# Patient Record
Sex: FEMALE | Race: WHITE | NOT HISPANIC OR LATINO | Employment: OTHER | URBAN - METROPOLITAN AREA
[De-identification: names, ages, dates, MRNs, and addresses within clinical notes are randomized per-mention and may not be internally consistent; named-entity substitution may affect disease eponyms.]

---

## 2023-09-02 PROBLEM — E78.5 DYSLIPIDEMIA: Status: ACTIVE | Noted: 2023-09-02

## 2023-09-02 PROBLEM — H52.7 REFRACTIVE ERROR: Status: ACTIVE | Noted: 2023-09-02

## 2023-09-02 PROBLEM — H04.129 DRY EYE: Status: ACTIVE | Noted: 2023-09-02

## 2023-09-02 PROBLEM — H26.9 EARLY CATARACT: Status: ACTIVE | Noted: 2023-09-02

## 2023-09-02 PROBLEM — M51.37 DEGENERATION OF LUMBAR OR LUMBOSACRAL INTERVERTEBRAL DISC: Status: ACTIVE | Noted: 2023-09-02

## 2023-09-02 PROBLEM — H26.9 CATARACTS, BOTH EYES: Status: ACTIVE | Noted: 2023-09-02

## 2023-09-02 PROBLEM — G44.329 CHRONIC POST-TRAUMATIC HEADACHE, NOT INTRACTABLE: Status: ACTIVE | Noted: 2023-09-02

## 2023-09-02 PROBLEM — H02.839 DERMATOCHALASIA: Status: ACTIVE | Noted: 2023-09-02

## 2023-09-02 PROBLEM — I65.23 BILATERAL CAROTID ARTERY STENOSIS: Status: ACTIVE | Noted: 2023-09-02

## 2023-09-02 PROBLEM — N81.10 FEMALE CYSTOCELE: Status: ACTIVE | Noted: 2023-09-02

## 2023-09-02 PROBLEM — H57.10 PAIN IN AND AROUND EYE: Status: ACTIVE | Noted: 2023-09-02

## 2023-09-02 PROBLEM — H53.8 BLURRED VISION, BILATERAL: Status: ACTIVE | Noted: 2023-09-02

## 2023-09-02 PROBLEM — M85.80 OSTEOPENIA: Status: ACTIVE | Noted: 2023-09-02

## 2023-09-02 PROBLEM — I77.9 CAROTID ARTERY DISEASE (CMS-HCC): Status: ACTIVE | Noted: 2023-09-02

## 2023-09-02 PROBLEM — E55.9 VITAMIN D DEFICIENCY: Status: ACTIVE | Noted: 2023-09-02

## 2023-09-02 PROBLEM — N95.2 ATROPHY OF VAGINA: Status: ACTIVE | Noted: 2023-09-02

## 2023-09-02 PROBLEM — S09.90XA INJURY OF HEAD: Status: ACTIVE | Noted: 2023-09-02

## 2023-09-02 PROBLEM — N64.4 BREAST PAIN, RIGHT: Status: ACTIVE | Noted: 2023-09-02

## 2023-09-02 PROBLEM — M25.579 ANKLE PAIN: Status: ACTIVE | Noted: 2023-09-02

## 2023-09-02 PROBLEM — I34.0 MITRAL VALVE REGURGITATION: Status: ACTIVE | Noted: 2023-09-02

## 2023-09-02 PROBLEM — H34.211 HOLLENHORST PLAQUE, RIGHT EYE: Status: ACTIVE | Noted: 2023-09-02

## 2023-09-02 PROBLEM — H25.11 AGE-RELATED NUCLEAR CATARACT OF RIGHT EYE: Status: ACTIVE | Noted: 2023-09-02

## 2023-09-02 PROBLEM — K21.9 GERD (GASTROESOPHAGEAL REFLUX DISEASE): Status: ACTIVE | Noted: 2023-09-02

## 2023-09-02 PROBLEM — H43.829 VITREOMACULAR TRACTION SYNDROME: Status: ACTIVE | Noted: 2023-09-02

## 2023-09-02 PROBLEM — H01.009 BLEPHARITIS: Status: ACTIVE | Noted: 2023-09-02

## 2023-09-02 PROBLEM — L90.0 LICHEN SCLEROSUS: Status: ACTIVE | Noted: 2023-09-02

## 2023-09-02 PROBLEM — H35.9 RETINAL DISORDERS: Status: ACTIVE | Noted: 2023-09-02

## 2023-09-02 PROBLEM — M81.0 AGE RELATED OSTEOPOROSIS: Status: ACTIVE | Noted: 2023-09-02

## 2023-09-02 PROBLEM — R73.02 IMPAIRED GLUCOSE TOLERANCE: Status: ACTIVE | Noted: 2023-09-02

## 2023-09-02 PROBLEM — H04.129 TEAR FILM INSUFFICIENCY: Status: ACTIVE | Noted: 2023-09-02

## 2023-09-02 PROBLEM — H25.10 NUCLEAR SCLEROTIC CATARACT: Status: ACTIVE | Noted: 2023-09-02

## 2023-09-02 PROBLEM — R73.09: Status: ACTIVE | Noted: 2023-09-02

## 2023-09-02 PROBLEM — H57.8A9 SENSATION OF FOREIGN BODY IN EYE: Status: ACTIVE | Noted: 2023-09-02

## 2023-09-02 PROBLEM — E78.5 HYPERLIPIDEMIA: Status: ACTIVE | Noted: 2023-09-02

## 2023-09-02 PROBLEM — M51.379 DEGENERATION OF LUMBAR OR LUMBOSACRAL INTERVERTEBRAL DISC: Status: ACTIVE | Noted: 2023-09-02

## 2023-09-02 RX ORDER — ASPIRIN 81 MG/1
81 TABLET ORAL DAILY
COMMUNITY
Start: 2017-09-06

## 2023-09-02 RX ORDER — BIOTIN/LUTEIN 5000MCG-10
1 TABLET ORAL DAILY
COMMUNITY
End: 2023-10-27 | Stop reason: ALTCHOICE

## 2023-09-02 RX ORDER — VIT C/E/ZN/COPPR/LUTEIN/ZEAXAN 250MG-90MG
50 CAPSULE ORAL DAILY
COMMUNITY
Start: 2021-01-08

## 2023-09-02 RX ORDER — HYDROGEN PEROXIDE 3 %
40 SOLUTION, NON-ORAL MISCELLANEOUS
COMMUNITY
Start: 2015-09-27

## 2023-09-02 RX ORDER — ATORVASTATIN CALCIUM 80 MG/1
80 TABLET, FILM COATED ORAL DAILY
COMMUNITY
End: 2023-11-03

## 2023-09-02 RX ORDER — ACETAMINOPHEN 500 MG
TABLET ORAL
COMMUNITY
End: 2023-10-09 | Stop reason: ALTCHOICE

## 2023-09-02 RX ORDER — IBUPROFEN 200 MG
200 TABLET ORAL 3 TIMES DAILY PRN
COMMUNITY

## 2023-09-02 RX ORDER — ATORVASTATIN CALCIUM 20 MG/1
20 TABLET, FILM COATED ORAL DAILY
COMMUNITY
End: 2023-10-04 | Stop reason: SDUPTHER

## 2023-09-07 ENCOUNTER — HOSPITAL ENCOUNTER (OUTPATIENT)
Dept: DATA CONVERSION | Facility: HOSPITAL | Age: 73
Discharge: HOME | End: 2023-09-07
Payer: COMMERCIAL

## 2023-09-07 DIAGNOSIS — H25.12 AGE-RELATED NUCLEAR CATARACT, LEFT EYE: ICD-10-CM

## 2023-09-07 DIAGNOSIS — H25.11 AGE-RELATED NUCLEAR CATARACT, RIGHT EYE: ICD-10-CM

## 2023-09-12 ENCOUNTER — HOSPITAL ENCOUNTER (OUTPATIENT)
Dept: DATA CONVERSION | Facility: HOSPITAL | Age: 73
Discharge: HOME | End: 2023-09-12
Payer: COMMERCIAL

## 2023-09-12 DIAGNOSIS — H25.11 AGE-RELATED NUCLEAR CATARACT, RIGHT EYE: ICD-10-CM

## 2023-09-26 ENCOUNTER — HOSPITAL ENCOUNTER (OUTPATIENT)
Dept: DATA CONVERSION | Facility: HOSPITAL | Age: 73
Discharge: HOME | End: 2023-09-26
Payer: COMMERCIAL

## 2023-09-26 DIAGNOSIS — H25.12 AGE-RELATED NUCLEAR CATARACT, LEFT EYE: ICD-10-CM

## 2023-10-03 ENCOUNTER — PREP FOR PROCEDURE (OUTPATIENT)
Dept: OPHTHALMOLOGY | Facility: CLINIC | Age: 73
End: 2023-10-03

## 2023-10-03 ENCOUNTER — OFFICE VISIT (OUTPATIENT)
Dept: OPHTHALMOLOGY | Facility: CLINIC | Age: 73
End: 2023-10-03
Payer: MEDICARE

## 2023-10-03 DIAGNOSIS — H35.341 MACULAR HOLE, RIGHT EYE: ICD-10-CM

## 2023-10-03 DIAGNOSIS — H35.40 PERIPHERAL RETINAL THINNING: Primary | ICD-10-CM

## 2023-10-03 PROCEDURE — 1036F TOBACCO NON-USER: CPT | Performed by: OPHTHALMOLOGY

## 2023-10-03 PROCEDURE — 99214 OFFICE O/P EST MOD 30 MIN: CPT | Performed by: OPHTHALMOLOGY

## 2023-10-03 PROCEDURE — 1159F MED LIST DOCD IN RCRD: CPT | Performed by: OPHTHALMOLOGY

## 2023-10-03 PROCEDURE — 92134 CPTRZ OPH DX IMG PST SGM RTA: CPT | Mod: BILATERAL PROCEDURE | Performed by: OPHTHALMOLOGY

## 2023-10-03 PROCEDURE — 1160F RVW MEDS BY RX/DR IN RCRD: CPT | Performed by: OPHTHALMOLOGY

## 2023-10-03 ASSESSMENT — TONOMETRY
OS_IOP_MMHG: 13
IOP_METHOD: GOLDMANN APPLANATION
OD_IOP_MMHG: 12

## 2023-10-03 ASSESSMENT — EXTERNAL EXAM - LEFT EYE: OS_EXAM: NORMAL

## 2023-10-03 ASSESSMENT — VISUAL ACUITY
OS_CC: 20/60
METHOD: SNELLEN - LINEAR
OD_CC: 20/200
CORRECTION_TYPE: GLASSES
OD_PH_CC: NO IMPROVEMENT
OS_PH_CC: 20/30

## 2023-10-03 ASSESSMENT — SLIT LAMP EXAM - LIDS
COMMENTS: NORMAL
COMMENTS: NORMAL

## 2023-10-03 ASSESSMENT — EXTERNAL EXAM - RIGHT EYE: OD_EXAM: NORMAL

## 2023-10-03 NOTE — PROGRESS NOTES
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 Infections, discomfort form sutures and hemorrhage as well as retinal detachment were explained. The biggest risk of  surgery failing in a retinal detachment are due to challenges in surgery or late scar formation described as proliferative vitreoretinopathy. In a membrane peel there can be  along period for recovery of visual acuity, in more long standing membrane,s takes longer to resolve retinal abnormalities            TODAY   (OU)   - OCT Macula By:Forrest Jacobs  OCT Macula    OD  wnl.  no retinal thinning  OS  minimal VMA with mild blunting of foveal contour, unchanged     also  1) Hollenhorst plaque, right eye   carotid ultrasound was normal per patient    2) Vitreomacular traction syndrome of left eye  --monitor- minimal traction     3) Nuclear sclerotic cataract of both eyes  -not visually significant          Hi quality OCT  scans obtained  signal good    OCT OD - Abnormal  Foveal Contour, PVD FTMH Edema, IS/OS Junction abnormal  OCT OS - Normal Foveal Contour, No Edema, IS/OS Junction Normal VMA    additional commnents:                  FOLLOWUP SCHEDULED:

## 2023-10-04 ENCOUNTER — OFFICE VISIT (OUTPATIENT)
Dept: OPHTHALMOLOGY | Facility: CLINIC | Age: 73
End: 2023-10-04
Payer: MEDICARE

## 2023-10-04 DIAGNOSIS — Z96.1 PSEUDOPHAKIA OF BOTH EYES: ICD-10-CM

## 2023-10-04 DIAGNOSIS — Z98.42 STATUS POST LEFT CATARACT EXTRACTION: Primary | ICD-10-CM

## 2023-10-04 DIAGNOSIS — Z98.41 STATUS POST RIGHT CATARACT EXTRACTION: ICD-10-CM

## 2023-10-04 PROCEDURE — 99024 POSTOP FOLLOW-UP VISIT: CPT | Performed by: OPHTHALMOLOGY

## 2023-10-04 RX ORDER — KETOROLAC TROMETHAMINE 5 MG/ML
1 SOLUTION OPHTHALMIC 4 TIMES DAILY
COMMUNITY
Start: 2023-02-13 | End: 2023-10-27 | Stop reason: ALTCHOICE

## 2023-10-04 RX ORDER — PREDNISOLONE ACETATE 10 MG/ML
1 SUSPENSION/ DROPS OPHTHALMIC 4 TIMES DAILY
COMMUNITY
Start: 2023-02-13 | End: 2023-10-27 | Stop reason: ALTCHOICE

## 2023-10-04 RX ORDER — CIPROFLOXACIN HYDROCHLORIDE 3 MG/ML
1 SOLUTION/ DROPS OPHTHALMIC 4 TIMES DAILY
COMMUNITY
Start: 2023-02-13 | End: 2023-10-27 | Stop reason: SINTOL

## 2023-10-04 ASSESSMENT — VISUAL ACUITY
OD_SC: CF AT 3'
OS_SC: 20/40
OS_SC+: -2
METHOD: SNELLEN - LINEAR

## 2023-10-04 ASSESSMENT — ENCOUNTER SYMPTOMS
ENDOCRINE NEGATIVE: 0
RESPIRATORY NEGATIVE: 0
MUSCULOSKELETAL NEGATIVE: 0
EYES NEGATIVE: 0
NEUROLOGICAL NEGATIVE: 0
CONSTITUTIONAL NEGATIVE: 0
CARDIOVASCULAR NEGATIVE: 0
HEMATOLOGIC/LYMPHATIC NEGATIVE: 0
PSYCHIATRIC NEGATIVE: 0
ALLERGIC/IMMUNOLOGIC NEGATIVE: 0
GASTROINTESTINAL NEGATIVE: 0

## 2023-10-04 ASSESSMENT — EXTERNAL EXAM - RIGHT EYE: OD_EXAM: NORMAL

## 2023-10-04 ASSESSMENT — SLIT LAMP EXAM - LIDS
COMMENTS: NORMAL
COMMENTS: NORMAL

## 2023-10-04 ASSESSMENT — EXTERNAL EXAM - LEFT EYE: OS_EXAM: NORMAL

## 2023-10-04 NOTE — PROGRESS NOTES
Assessment/Plan   Problem List Items Addressed This Visit          Eye/Vision problems    Pseudophakia of both eyes     Well healing both eyes (OU), will plan for full post op in 3 weeks         Relevant Orders    Return visit    Status post right cataract extraction     Multiple weeks post op right eye (OD). Finish drop taper.    Use Prednisolone and Flurbiprofen once daily in the right eye for 1 week             Relevant Orders    Return visit    Status post left cataract extraction - Primary     Here for 1 week of the left eye. Discussed calling if any pain, redness, or vision loss. OK to discontinue use of shield at night. No bathing or swimming restrictions. No activity restrictions at this time.  Will plan to see back in 3 weeks for full post op both eyes (OU). Begin drop taper.    Use Prednisolone and Flurbiprofen TID in the left eye for 1 week  Use Prednisolone and Flurbiprofen BID in the left eye for 1 week  Use Prednisolone and Flurbiprofen daily in the left eye for 1 week              Relevant Orders    Return visit

## 2023-10-04 NOTE — ASSESSMENT & PLAN NOTE
Multiple weeks post op right eye (OD). Finish drop taper.    Use Prednisolone and Flurbiprofen once daily in the right eye for 1 week

## 2023-10-04 NOTE — ASSESSMENT & PLAN NOTE
Bedside and verbal shift report given to DAMIÁN Long (oncoming nurse) by ANEESH Herbert LPN (off going nurse). Report included SBAR, MAR and Kardex. Here for 1 week of the left eye. Discussed calling if any pain, redness, or vision loss. OK to discontinue use of shield at night. No bathing or swimming restrictions. No activity restrictions at this time.  Will plan to see back in 3 weeks for full post op both eyes (OU). Begin drop taper.    Use Prednisolone and Flurbiprofen TID in the left eye for 1 week  Use Prednisolone and Flurbiprofen BID in the left eye for 1 week  Use Prednisolone and Flurbiprofen daily in the left eye for 1 week

## 2023-10-04 NOTE — Clinical Note
Well healing from posterior chamber intraocular lens (PCIOL) both eyes (OU), due to see you for macular hole repair right eye (OD) next week. Will come in for final post op with me in 3 weeks unless we need to push back due to retina concerns.

## 2023-10-04 NOTE — PATIENT INSTRUCTIONS
Post-operative instructions for Cataract Surgery  Shaheen Ngo MD      Surgical eye: Left      Restrictions:  Continue to avoid rubbing or pressing on the eye(s)  May resume normal activity/bathing but if any issues please call  May discontinue plastic shield while sleeping/napping    Drop instructions:  Prednisolone (pink) and ketorolac (grey) three times daily for 1 week, then twice daily for 1 week, then once daily for 1 week    Wait several minutes after instillation of a drop before applying the next    Any concerns, issues, or questions please call our office  Please report any significantly increased redness/pain or any big vision changes    Thank you so much for choosing me to provide your care today!    If you were dilated your vision may remain blurry   or light sensitive for several hours.    The nature of eye and vision problems can require frequent follow up, please make every effort to adhere to any future appointments.    If you have any issues, questions, or concerns,   please do not hesitate to reach out.    If you receive a survey in regards to your care today, please mention any exceptional care my office staff and/or technicians provided.    You can reach our office at this number:  [phone]

## 2023-10-09 DIAGNOSIS — Z98.890 HISTORY OF EYE SURGERY: Primary | ICD-10-CM

## 2023-10-09 RX ORDER — PROPARACAINE HYDROCHLORIDE 5 MG/ML
1 SOLUTION/ DROPS OPHTHALMIC ONCE
Status: CANCELLED | OUTPATIENT
Start: 2023-10-12

## 2023-10-09 RX ORDER — PHENYLEPHRINE HYDROCHLORIDE 25 MG/ML
1 SOLUTION/ DROPS OPHTHALMIC
Status: CANCELLED | OUTPATIENT
Start: 2023-10-12 | End: 2023-10-12

## 2023-10-09 RX ORDER — PREDNISOLONE ACETATE 10 MG/ML
1 SUSPENSION/ DROPS OPHTHALMIC 4 TIMES DAILY
Qty: 3 ML | Refills: 0 | Status: SHIPPED | OUTPATIENT
Start: 2023-10-13 | End: 2023-10-13

## 2023-10-09 RX ORDER — TROPICAMIDE 10 MG/ML
1 SOLUTION/ DROPS OPHTHALMIC
Status: CANCELLED | OUTPATIENT
Start: 2023-10-09 | End: 2023-10-09

## 2023-10-10 RX ORDER — TROPICAMIDE 10 MG/ML
1 SOLUTION/ DROPS OPHTHALMIC
Status: CANCELLED | OUTPATIENT
Start: 2023-10-10

## 2023-10-10 RX ORDER — PHENYLEPHRINE HYDROCHLORIDE 25 MG/ML
1 SOLUTION/ DROPS OPHTHALMIC
Status: CANCELLED | OUTPATIENT
Start: 2023-10-10

## 2023-10-10 NOTE — H&P
History Of Present Illness  Sally A Behanna is a 72 y.o. female presenting with macular hole right eye (OD).     Past Medical History  She has a past medical history of Autoimmune disorder (CMS/HCC), Cataract, Delayed emergence from general anesthesia, Dry eye syndrome of unspecified lacrimal gland (05/17/2017), GERD (gastroesophageal reflux disease), Heart valve disease, Hordeolum externum unspecified eye, unspecified eyelid (05/17/2017), Hyperlipidemia, Osteopenia, Personal history of diseases of the skin and subcutaneous tissue, and Toxic effect of venom of bees, accidental (unintentional), initial encounter (05/17/2017).    Surgical History  She has a past surgical history that includes Other surgical history (10/24/2022); Other surgical history (10/24/2022); Other surgical history (10/24/2022); Other surgical history (10/24/2022); Cataract extraction; and Other surgical history.     Social History  She reports that she has never smoked. She has never used smokeless tobacco. She reports current alcohol use. She reports that she does not use drugs.     Allergies  Mercury, Acrylic acid, Amoxicillin-pot clavulanate, Balsam peru, Codeine, Iodinated contrast media, Latex, Menthol, Sulfamethoxazole-trimethoprim, Bacitracin, Ciprofloxacin, and Gold salts    ROS  Patient denies ocular pain, redness, discharge, decreased vision, double vision, blind spots, flashes, or floaters.     Physical Exam  Constitutional: AOx3, no distress, alert and cooperative  Eyes: Globe intact and grossly normal  Head/Neck: Atraumatic   Cardiovascular: Warm, well perfused  Respiratory/Thorax: Symmetric chest rise  Extremities: Moving all extremities equally  Neurologic: No gross neurologic deficits  Psychological: Appropriate mood and affect  Skin: Warm and dry         Assessment/Plan   # Full thickness macular hole right eye (OD)    - Proceed with pars plana vitrectomy, membrane peel, gas right eye (OD)       Kendell Villar MD,  PhD  Ophthalmology, PGY-2  9:51 PM  October 9, 2023

## 2023-10-11 ENCOUNTER — ANESTHESIA EVENT (OUTPATIENT)
Dept: OPERATING ROOM | Facility: CLINIC | Age: 73
End: 2023-10-11
Payer: MEDICARE

## 2023-10-12 ENCOUNTER — ANESTHESIA (OUTPATIENT)
Dept: OPERATING ROOM | Facility: CLINIC | Age: 73
End: 2023-10-12
Payer: MEDICARE

## 2023-10-12 ENCOUNTER — HOSPITAL ENCOUNTER (OUTPATIENT)
Facility: CLINIC | Age: 73
Setting detail: OUTPATIENT SURGERY
Discharge: HOME | End: 2023-10-12
Attending: OPHTHALMOLOGY | Admitting: OPHTHALMOLOGY
Payer: MEDICARE

## 2023-10-12 VITALS
HEART RATE: 71 BPM | DIASTOLIC BLOOD PRESSURE: 61 MMHG | HEIGHT: 67 IN | OXYGEN SATURATION: 99 % | RESPIRATION RATE: 17 BRPM | SYSTOLIC BLOOD PRESSURE: 111 MMHG | TEMPERATURE: 97.5 F | WEIGHT: 142.64 LBS | BODY MASS INDEX: 22.39 KG/M2

## 2023-10-12 DIAGNOSIS — H35.40 PERIPHERAL RETINAL THINNING: Primary | ICD-10-CM

## 2023-10-12 PROCEDURE — 7100000010 HC PHASE TWO TIME - EACH INCREMENTAL 1 MINUTE: Performed by: OPHTHALMOLOGY

## 2023-10-12 PROCEDURE — 2500000001 HC RX 250 WO HCPCS SELF ADMINISTERED DRUGS (ALT 637 FOR MEDICARE OP): Performed by: OPHTHALMOLOGY

## 2023-10-12 PROCEDURE — 99100 ANES PT EXTEME AGE<1 YR&>70: CPT | Performed by: STUDENT IN AN ORGANIZED HEALTH CARE EDUCATION/TRAINING PROGRAM

## 2023-10-12 PROCEDURE — A67036 PR VITRECTOMY,MECHANICAL: Performed by: ANESTHESIOLOGIST ASSISTANT

## 2023-10-12 PROCEDURE — A4217 STERILE WATER/SALINE, 500 ML: HCPCS | Performed by: OPHTHALMOLOGY

## 2023-10-12 PROCEDURE — 2500000004 HC RX 250 GENERAL PHARMACY W/ HCPCS (ALT 636 FOR OP/ED)

## 2023-10-12 PROCEDURE — 2500000005 HC RX 250 GENERAL PHARMACY W/O HCPCS: Performed by: OPHTHALMOLOGY

## 2023-10-12 PROCEDURE — 3700000002 HC GENERAL ANESTHESIA TIME - EACH INCREMENTAL 1 MINUTE: Performed by: OPHTHALMOLOGY

## 2023-10-12 PROCEDURE — 3700000001 HC GENERAL ANESTHESIA TIME - INITIAL BASE CHARGE: Performed by: OPHTHALMOLOGY

## 2023-10-12 PROCEDURE — 3600000008 HC OR TIME - EACH INCREMENTAL 1 MINUTE - PROCEDURE LEVEL THREE: Performed by: OPHTHALMOLOGY

## 2023-10-12 PROCEDURE — 67042 VIT FOR MACULAR HOLE: CPT | Performed by: OPHTHALMOLOGY

## 2023-10-12 PROCEDURE — 2500000004 HC RX 250 GENERAL PHARMACY W/ HCPCS (ALT 636 FOR OP/ED): Performed by: OPHTHALMOLOGY

## 2023-10-12 PROCEDURE — 2720000007 HC OR 272 NO HCPCS: Performed by: OPHTHALMOLOGY

## 2023-10-12 PROCEDURE — 2500000001 HC RX 250 WO HCPCS SELF ADMINISTERED DRUGS (ALT 637 FOR MEDICARE OP)

## 2023-10-12 PROCEDURE — A67036 PR VITRECTOMY,MECHANICAL: Performed by: STUDENT IN AN ORGANIZED HEALTH CARE EDUCATION/TRAINING PROGRAM

## 2023-10-12 PROCEDURE — 7100000009 HC PHASE TWO TIME - INITIAL BASE CHARGE: Performed by: OPHTHALMOLOGY

## 2023-10-12 PROCEDURE — 3600000003 HC OR TIME - INITIAL BASE CHARGE - PROCEDURE LEVEL THREE: Performed by: OPHTHALMOLOGY

## 2023-10-12 PROCEDURE — 2580000001 HC RX 258 IV SOLUTIONS

## 2023-10-12 RX ORDER — TROPICAMIDE 10 MG/ML
1 SOLUTION/ DROPS OPHTHALMIC
Status: DISCONTINUED | OUTPATIENT
Start: 2023-10-12 | End: 2023-10-12 | Stop reason: HOSPADM

## 2023-10-12 RX ORDER — TROPICAMIDE 10 MG/ML
1 SOLUTION/ DROPS OPHTHALMIC
Status: COMPLETED | OUTPATIENT
Start: 2023-10-12 | End: 2023-10-12

## 2023-10-12 RX ORDER — POVIDONE-IODINE 5 %
SOLUTION, NON-ORAL OPHTHALMIC (EYE) AS NEEDED
Status: DISCONTINUED | OUTPATIENT
Start: 2023-10-12 | End: 2023-10-12 | Stop reason: HOSPADM

## 2023-10-12 RX ORDER — SODIUM CHLORIDE, SODIUM LACTATE, POTASSIUM CHLORIDE, CALCIUM CHLORIDE 600; 310; 30; 20 MG/100ML; MG/100ML; MG/100ML; MG/100ML
100 INJECTION, SOLUTION INTRAVENOUS CONTINUOUS
Status: DISCONTINUED | OUTPATIENT
Start: 2023-10-12 | End: 2023-10-12 | Stop reason: HOSPADM

## 2023-10-12 RX ORDER — HYDRALAZINE HYDROCHLORIDE 20 MG/ML
5 INJECTION INTRAMUSCULAR; INTRAVENOUS EVERY 30 MIN PRN
Status: DISCONTINUED | OUTPATIENT
Start: 2023-10-12 | End: 2023-10-12 | Stop reason: HOSPADM

## 2023-10-12 RX ORDER — INDOCYANINE GREEN AND WATER 25 MG
KIT INJECTION AS NEEDED
Status: DISCONTINUED | OUTPATIENT
Start: 2023-10-12 | End: 2023-10-12 | Stop reason: HOSPADM

## 2023-10-12 RX ORDER — SODIUM CHLORIDE, SODIUM LACTATE, POTASSIUM CHLORIDE, CALCIUM CHLORIDE 600; 310; 30; 20 MG/100ML; MG/100ML; MG/100ML; MG/100ML
INJECTION, SOLUTION INTRAVENOUS CONTINUOUS PRN
Status: DISCONTINUED | OUTPATIENT
Start: 2023-10-12 | End: 2023-10-12

## 2023-10-12 RX ORDER — PROPARACAINE HYDROCHLORIDE 5 MG/ML
1 SOLUTION/ DROPS OPHTHALMIC ONCE
Status: COMPLETED | OUTPATIENT
Start: 2023-10-12 | End: 2023-10-12

## 2023-10-12 RX ORDER — PROPOFOL 10 MG/ML
INJECTION, EMULSION INTRAVENOUS AS NEEDED
Status: DISCONTINUED | OUTPATIENT
Start: 2023-10-12 | End: 2023-10-12

## 2023-10-12 RX ORDER — WATER 1 ML/ML
IRRIGANT IRRIGATION AS NEEDED
Status: DISCONTINUED | OUTPATIENT
Start: 2023-10-12 | End: 2023-10-12 | Stop reason: HOSPADM

## 2023-10-12 RX ORDER — PHENYLEPHRINE HYDROCHLORIDE 25 MG/ML
1 SOLUTION/ DROPS OPHTHALMIC
Status: DISCONTINUED | OUTPATIENT
Start: 2023-10-12 | End: 2023-10-12 | Stop reason: HOSPADM

## 2023-10-12 RX ORDER — TOBRAMYCIN AND DEXAMETHASONE 3; 1 MG/ML; MG/ML
1 SUSPENSION/ DROPS OPHTHALMIC 4 TIMES DAILY
Qty: 0.8 ML | Refills: 0 | Status: SHIPPED | OUTPATIENT
Start: 2023-10-12 | End: 2023-10-13 | Stop reason: SDUPTHER

## 2023-10-12 RX ORDER — TRIAMCINOLONE ACETONIDE 40 MG/ML
INJECTION, SUSPENSION INTRA-ARTICULAR; INTRAMUSCULAR AS NEEDED
Status: DISCONTINUED | OUTPATIENT
Start: 2023-10-12 | End: 2023-10-12 | Stop reason: HOSPADM

## 2023-10-12 RX ORDER — ONDANSETRON HYDROCHLORIDE 2 MG/ML
4 INJECTION, SOLUTION INTRAVENOUS ONCE AS NEEDED
Status: DISCONTINUED | OUTPATIENT
Start: 2023-10-12 | End: 2023-10-12 | Stop reason: HOSPADM

## 2023-10-12 RX ORDER — ALBUTEROL SULFATE 0.83 MG/ML
2.5 SOLUTION RESPIRATORY (INHALATION) ONCE AS NEEDED
Status: DISCONTINUED | OUTPATIENT
Start: 2023-10-12 | End: 2023-10-12 | Stop reason: HOSPADM

## 2023-10-12 RX ORDER — FENTANYL CITRATE 50 UG/ML
INJECTION, SOLUTION INTRAMUSCULAR; INTRAVENOUS AS NEEDED
Status: DISCONTINUED | OUTPATIENT
Start: 2023-10-12 | End: 2023-10-12

## 2023-10-12 RX ORDER — PHENYLEPHRINE HYDROCHLORIDE 25 MG/ML
1 SOLUTION/ DROPS OPHTHALMIC
Status: COMPLETED | OUTPATIENT
Start: 2023-10-12 | End: 2023-10-12

## 2023-10-12 RX ORDER — HYDRALAZINE HYDROCHLORIDE 20 MG/ML
INJECTION INTRAMUSCULAR; INTRAVENOUS AS NEEDED
Status: DISCONTINUED | OUTPATIENT
Start: 2023-10-12 | End: 2023-10-12

## 2023-10-12 RX ADMIN — PROPARACAINE HYDROCHLORIDE 1 DROP: 5 SOLUTION/ DROPS OPHTHALMIC at 08:15

## 2023-10-12 RX ADMIN — FENTANYL CITRATE 25 MCG: 0.05 INJECTION, SOLUTION INTRAMUSCULAR; INTRAVENOUS at 10:10

## 2023-10-12 RX ADMIN — DEXMEDETOMIDINE HYDROCHLORIDE 5 MCG: 100 INJECTION, SOLUTION INTRAVENOUS at 09:44

## 2023-10-12 RX ADMIN — PHENYLEPHRINE HYDROCHLORIDE 1 DROP: 25 SOLUTION/ DROPS OPHTHALMIC at 08:15

## 2023-10-12 RX ADMIN — PHENYLEPHRINE HYDROCHLORIDE 1 DROP: 25 SOLUTION/ DROPS OPHTHALMIC at 08:25

## 2023-10-12 RX ADMIN — SODIUM CHLORIDE, POTASSIUM CHLORIDE, SODIUM LACTATE AND CALCIUM CHLORIDE: 600; 310; 30; 20 INJECTION, SOLUTION INTRAVENOUS at 09:42

## 2023-10-12 RX ADMIN — PHENYLEPHRINE HYDROCHLORIDE 1 DROP: 25 SOLUTION/ DROPS OPHTHALMIC at 08:20

## 2023-10-12 RX ADMIN — PROPOFOL 30 MG: 10 INJECTION, EMULSION INTRAVENOUS at 09:47

## 2023-10-12 RX ADMIN — HYDRALAZINE HYDROCHLORIDE 2.5 MG: 20 INJECTION INTRAMUSCULAR; INTRAVENOUS at 10:35

## 2023-10-12 RX ADMIN — HYDRALAZINE HYDROCHLORIDE 5 MG: 20 INJECTION INTRAMUSCULAR; INTRAVENOUS at 10:31

## 2023-10-12 RX ADMIN — TROPICAMIDE 1 DROP: 10 SOLUTION/ DROPS OPHTHALMIC at 08:20

## 2023-10-12 RX ADMIN — TROPICAMIDE 1 DROP: 10 SOLUTION/ DROPS OPHTHALMIC at 08:25

## 2023-10-12 RX ADMIN — DEXMEDETOMIDINE HYDROCHLORIDE 5 MCG: 100 INJECTION, SOLUTION INTRAVENOUS at 09:42

## 2023-10-12 RX ADMIN — TROPICAMIDE 1 DROP: 10 SOLUTION/ DROPS OPHTHALMIC at 08:15

## 2023-10-12 RX ADMIN — PROPOFOL 10 MG: 10 INJECTION, EMULSION INTRAVENOUS at 09:48

## 2023-10-12 ASSESSMENT — PAIN SCALES - GENERAL
PAINLEVEL_OUTOF10: 0 - NO PAIN
PAINLEVEL_OUTOF10: 2
PAINLEVEL_OUTOF10: 2
PAINLEVEL_OUTOF10: 0 - NO PAIN

## 2023-10-12 ASSESSMENT — COLUMBIA-SUICIDE SEVERITY RATING SCALE - C-SSRS
1. IN THE PAST MONTH, HAVE YOU WISHED YOU WERE DEAD OR WISHED YOU COULD GO TO SLEEP AND NOT WAKE UP?: NO
2. HAVE YOU ACTUALLY HAD ANY THOUGHTS OF KILLING YOURSELF?: NO
6. HAVE YOU EVER DONE ANYTHING, STARTED TO DO ANYTHING, OR PREPARED TO DO ANYTHING TO END YOUR LIFE?: NO

## 2023-10-12 ASSESSMENT — PAIN - FUNCTIONAL ASSESSMENT
PAIN_FUNCTIONAL_ASSESSMENT: 0-10

## 2023-10-12 NOTE — ANESTHESIA POSTPROCEDURE EVALUATION
Patient: Sally A Behanna    Procedure Summary       Date: 10/12/23 Room / Location: [facility] [place] [facility] OR 04 / Virtual [facility] OR    Anesthesia Start: 0942 Anesthesia Stop: 1105    Procedure: Vitrectomy Pars Plana with gas c3f8 (Right) Diagnosis:       Macular hole, right eye      (Macular hole, right eye [H35.341])    Surgeons: Forrest Jacobs MD Responsible Provider: Princess May MD    Anesthesia Type: MAC ASA Status: 2            Anesthesia Type: MAC    Vitals Value Taken Time   /61 10/12/23 1130   Temp 36.4 °C (97.5 °F) 10/12/23 1100   Pulse 71 10/12/23 1130   Resp 17 10/12/23 1130   SpO2 99 % 10/12/23 1130       Anesthesia Post Evaluation    Patient location during evaluation: bedside  Patient participation: complete - patient participated  Level of consciousness: awake and alert  Pain management: adequate  Airway patency: patent  Cardiovascular status: acceptable  Respiratory status: acceptable  Hydration status: acceptable        There were no known notable events for this encounter.

## 2023-10-12 NOTE — BRIEF OP NOTE
Date: 10/12/2023  OR Location: [facility] OR    Name: Sally A Behanna, : 1950, Age: 72 y.o., MRN: [id number], Sex: female    Diagnosis  Pre-op Diagnosis     * Macular hole, right eye [H35.341] Post-op Diagnosis     * Macular hole, right eye [H35.341]     Procedures  Vitrectomy Pars Plana with gas c3f8  33444 - [place] VITRECTOMY PARS PLANA REMOVE INT MEMB RETINA      Surgeons      * Forrest Jacobs - Primary    Resident/Fellow/Other Assistant:  Nolan Mcgraw MD (Fellow)    Procedure Summary  Anesthesia: Monitor Anesthesia Care ++ Retrobulbar block OD  ASA: II  Anesthesia Staff: Anesthesiologist: Princess May MD  CRNA: MEIR Pardo-CRNA  C-AA: DELMA Blair  Estimated Blood Loss: 0 mL  Intra-op Medications:   Medication Name Total Dose   povidone-iodine 5 % ophthalmic solution 1 Application   Retrobulbar Block #4 ([facility]) 7 mL              Anesthesia Record               Intraprocedure I/O Totals          Intake    Dexmedetomidine 0.00 mL    The total shown is the total volume documented since Anesthesia Start was filed.    lactated Ringer's infusion 600.00 mL    Total Intake 600 mL       Output    Urine 0 mL    Est. Blood Loss 1 mL    Total Output 1 mL       Net    Net Volume 599 mL          Specimen: No specimens collected     Staff:   Circulator: Foster Balderas RN  Scrub Person: Farida Simon          Findings: Full Thickness Macular Hole, Right Eye    Complications:  None; patient tolerated the procedure well.     Disposition: PACU - hemodynamically stable.  Condition: stable  Specimens Collected: No specimens collected  Attending Attestation:   A qualified resident physician was not available and the use of a skilled assistant surgeon, Nolan Mcgraw MD, was required for the the following portions of this case: Pars plana vitrectomy with scleral depression, membrane peel, air-fluid exchange, C3F8 Gas injection- right eye    Forrest Jacobs  Phone Number: [phone]

## 2023-10-12 NOTE — ANESTHESIA PREPROCEDURE EVALUATION
Patient: Sally A Behanna    Procedure Information       Date/Time: 10/12/23 0845    Procedure: Vitrectomy Pars Plana (Right)    [place] [facility] [place] [facility] [place]    Surgeons: Forrest Jacobs MD            Relevant Problems   Anesthesia (within normal limits)      Cardiovascular  ECHO 7/22 EF 60-=64% Mild mitral regurg   (+) Bilateral carotid artery stenosis   (+) Breast pain, right   (+) Carotid artery disease (CMS/HCC)   (+) Hyperlipidemia   (+) Mitral valve regurgitation      GI   (+) GERD (gastroesophageal reflux disease)      Neuro/Psych   (+) Bilateral carotid artery stenosis   (+) Carotid artery disease (CMS/HCC)   (+) Chronic post-traumatic headache, not intractable      Musculoskeletal   (+) Degeneration of lumbar or lumbosacral intervertebral disc       Prior to Admission medications    Medication Sig Start Date End Date Taking? Authorizing Provider   aspirin 81 mg EC tablet Take 1 tablet (81 mg) by mouth once daily. 9/6/17  Yes Historical Provider, MD   biotin-lutein (Biotin Plus) 5,000 mcg- 10 mg tablet Take 1 tablet by mouth once daily.   Yes Historical Provider, MD   cholecalciferol (Vitamin D-3) 25 MCG (1000 UT) capsule Take 1 capsule (25 mcg) by mouth once daily. 1/8/21  Yes Historical Provider, MD   esomeprazole (NexIUM) 20 mg DR capsule Take 2 capsules (40 mg) by mouth once daily in the morning. Take before meals. 9/27/15  Yes Historical Provider, MD   ibuprofen (Motrin IB) 200 mg tablet Take 1 tablet (200 mg) by mouth 3 times a day as needed for mild pain (1 - 3).   Yes Historical Provider, MD   Lipitor 80 mg tablet Take 1 tablet (80 mg) by mouth once daily.   Yes Historical Provider, MD   prednisoLONE acetate (Pred-Forte) 1 % ophthalmic suspension Administer 1 drop into the left eye 4 times a day. 2/13/23  Yes Historical Provider, MD   ciprofloxacin (Ciloxan) 0.3 % ophthalmic solution Administer 1 drop into the left eye 4 times a day. 2/13/23   Historical Provider, MD   ketorolac  (Acular) 0.5 % ophthalmic solution Administer 1 drop into the left eye 4 times a day. 2/13/23   Historical Provider, MD   prednisoLONE acetate (Pred-Forte) 1 % ophthalmic suspension Administer 1 drop into the right eye 4 times a day for 14 days. Do not start before October 13, 2023.  Patient not taking: Reported on 10/12/2023 Do not start before October 13, 2023. 10/13/23 10/27/23  Kendell Villar MD   calcium carbonate-vitamin D3 (Caltrate with Vitamin D3) 600 mg-20 mcg (800 unit) tablet Caltrate 600 + D 600-125 MG-IU TABS   Refills: 0       Active  10/9/23  Historical Provider, MD          Clinical information reviewed:   Tobacco  Allergies  Meds   Med Hx  Surg Hx   Fam Hx  Soc Hx        NPO Detail:  NPO/Void Status  Carbonhydrate Drink Given Prior to Surgery? : N  Date of Last Liquid: 10/11/23  Time of Last Liquid: 2230  Date of Last Solid: 10/11/23  Time of Last Solid: 2230  Last Intake Type: Clear fluids         Physical Exam    Airway  Mallampati: III     Cardiovascular   Rhythm: regular  Rate: normal     Dental   (+) upper dentures, lower dentures     Pulmonary   Breath sounds clear to auscultation     Abdominal            Anesthesia Plan    ASA 2     MAC     intravenous induction   Anesthetic plan and risks discussed with patient.    Plan discussed with CRNA.

## 2023-10-12 NOTE — OP NOTE
Vitrectomy Pars Plana with gas c3f8 (R) Operative Note     Date: 10/12/2023  OR Location: [facility] OR    Name: Sally A Behanna, : 1950, Age: 72 y.o., MRN: [id number], Sex: female    Diagnosis  Pre-op Diagnosis     * Macular hole, right eye [H35.341] Post-op Diagnosis     * Macular hole, right eye [H35.341]     Procedures  Vitrectomy Pars Plana with gas c3f8  80053 - [place] VITRECTOMY PARS PLANA REMOVE INT MEMB RETINA      Surgeons      * Forrest Jacobs - Primary    Resident/Fellow/Other Assistant:  Nolan Mcgraw MD (Fellow)    Procedure Summary  Anesthesia: Monitor Anesthesia Care  ASA: II  Anesthesia Staff: Anesthesiologist: Princess May MD  CRNA: MEIR Pardo-CRNA  C-AA: DELMA Blair  Estimated Blood Loss: 0 mL  Intra-op Medications:   Medication Name Total Dose   povidone-iodine 5 % ophthalmic solution 1 Application   Retrobulbar Block #4 ([facility] [place] [facility]) 7 mL              Anesthesia Record               Intraprocedure I/O Totals          Intake    Dexmedetomidine 0.00 mL    The total shown is the total volume documented since Anesthesia Start was filed.    lactated Ringer's infusion 600.00 mL    Total Intake 600 mL       Output    Urine 0 mL    Est. Blood Loss 1 mL    Total Output 1 mL       Net    Net Volume 599 mL          Specimen: No specimens collected     Staff:   Circulator: Foster Balderas RN  Scrub Person: Farida Simon         Drains and/or Catheters: * None in log *    Tourniquet Times: n/a        Implants: n/a    Findings: Full Thickness Macular Hole, Right Eye    Indications: Sally A Behanna is an 72 y.o. female who is having surgery for Macular hole, right eye [H35.341].     The patient was seen in the preoperative area. The risks, benefits, complications, treatment options, non-operative alternatives, expected recovery and outcomes were discussed with the patient. The possibilities of reaction to medication, pulmonary aspiration, injury to surrounding structures, bleeding,  recurrent infection, the need for additional procedures, failure to diagnose a condition, and creating a complication requiring transfusion or operation were discussed with the patient. The patient concurred with the proposed plan, giving informed consent.  The site of surgery was properly noted/marked if necessary per policy. The patient has been actively warmed in preoperative area. Preoperative antibiotics are not indicated. Venous thrombosis prophylaxis are not indicated.    Procedure Details:   DATE OF SURGERY: 10/12/2023    SURGEON: Forrest Jacobs MD    ASSISTANT: Nolan Mcgraw MD (Fellow)    PREOPERATIVE DIAGNOSIS  Pre-Op Diagnosis Codes:     * Macular hole, right eye [H35.341]    POSTOPERATIVE DIAGNOSIS   Post-op Diagnosis     * Macular hole, right eye [H35.341]    OPERATION PERFORMED    Repair of macular hole with:  25-gauge pars plana vitrectomy, membrane peel, fluid-air exchange, and injection of 16% C3F8 gas to the right eye    ANESTHESIA  Monitored anesthesia care with a standard block consisting of a 1:1 mixture of 4 %  lidocaine and 0.75% Marcaine with Wydase     FINDINGS  As detailed below     COMPLICATIONS  None     ESTIMATED BLOOD LOSS   Minimal     SPECIMENS REMOVED  None     JUSTIFICATION  Ms. Behanna is a 72 y.o. female with full thickness macular hole of the right eye. This was causing decreased visual function. The risks, benefits, and alternatives to the procedure were discussed with the patient including the risk for vision loss, blindness, retinal detachment, infection, bleeding, pain, high or low pressure in the eye, double vision, no benefit, need for additional procedures, and droopy eyelid, amongst others. The patient had a full opportunity to have all questions answered in clinic prior to surgery. Afterwards, the patient requested that we perform surgery and signed the consent form.     PROCEDURE:   The patient was brought to the preoperative holding area where the correct eye was  confirmed and marked. The patient was then brought to the operating room where a secondary time-out was performed to identify the correct patient, eye, procedure, and any allergies. The patient then underwent intravenous sedation by the anesthesia team followed by a block as described above. The eye was prepped and draped in the usual sterile ophthalmic fashion followed by placement of a lid speculum.     A 25-gauge trocar was placed in the inferotemporal quadrant 3.5 mm posterior to the limbus after conjunctival displacement.  A 4 mm infusion cannula was placed through this trocar, and the infusion cannula was confirmed in the vitreous cavity prior to turning it on. Two additional 25-gauge trocars were placed in a similar fashion in the superonasal and superotemporal quadrants 3.5 mm posterior to the limbus after conjunctival displacement.     At this time, a standard three-port pars plana vitrectomy was performed using the light pipe, the cutter, and the BIOM viewing system. A core vitreous dissection was performed. The retina was inspected and was found to have a full thickness macular hole.  A vitreous separation was created mechanically using the .  A thorough peripheral vitreous dissection was performed. The ILM was peeled off the surface of the macula in the posterior pole with ILM forceps, after injection of diluted ICG for improved visualization.    The peripheral retina was inspected with scleral depression and was found to be attached without any retinal holes, breaks or fluid.  A complete fluid-air exchange was performed using a soft tip cannula. Residual fluid was removed from the posterior pole over the optic nerve.  The superonasal trocar was removed and the sclerotomy was sutured with an interrupted 7-0 Vicryl. An exchange of 16%C3F8- gas was then performed through the infusion cannula and vented through the superotemporal trocar. The superotemporal trocar was removed and the sclerotomy was  sutured with an interrupted 7-0 Vicryl. The infusion cannula and associated trocar were removed and the sclerotomy was sutured with an interrupted 7-0 Vicryl. The eye was confirmed to be at a physiologic level by digital palpation after removal of the trocars.     The lid speculum and drapes were removed. Antibiotic ointment was applied. The eye was patched and shielded. The patient tolerated the procedure well without any intraoperative or immediate postoperative complications. The patient was taken to the recovery room in good condition. The patient was instructed to maintain elevated head positioning The patient will follow up with Forrest Jacobs MD in clinic on the following morning.   Complications:  None; patient tolerated the procedure well.    Disposition: PACU - hemodynamically stable.  Condition: stable         Additional Details: A qualified resident physician was not available and the use of a skilled assistant surgeon, Nolan Mcgraw MD, was required for the the following portions of this case: Pars plana vitrectomy with scleral depression, membrane peel, air-fluid exchange, C3F8 Gas injection- right eye    Attending Attestation:     oFrrest Jacobs  Phone Number: [phone]

## 2023-10-12 NOTE — DISCHARGE INSTRUCTIONS
Shield on until tomorrow. Must have head upright. No bending, lifting, straining. No water in the eye.

## 2023-10-13 ENCOUNTER — OFFICE VISIT (OUTPATIENT)
Dept: OPHTHALMOLOGY | Facility: CLINIC | Age: 73
End: 2023-10-13
Payer: MEDICARE

## 2023-10-13 DIAGNOSIS — Z98.890 HISTORY OF EYE SURGERY: ICD-10-CM

## 2023-10-13 PROCEDURE — 99024 POSTOP FOLLOW-UP VISIT: CPT | Performed by: OPHTHALMOLOGY

## 2023-10-13 RX ORDER — PREDNISOLONE ACETATE 10 MG/ML
1 SUSPENSION/ DROPS OPHTHALMIC 4 TIMES DAILY
Qty: 3 ML | Refills: 0 | Status: SHIPPED | OUTPATIENT
Start: 2023-10-13 | End: 2023-10-27

## 2023-10-13 RX ORDER — TOBRAMYCIN AND DEXAMETHASONE 3; 1 MG/ML; MG/ML
1 SUSPENSION/ DROPS OPHTHALMIC 4 TIMES DAILY
Qty: 0.8 ML | Refills: 0 | Status: SHIPPED | OUTPATIENT
Start: 2023-10-13 | End: 2023-10-17

## 2023-10-13 ASSESSMENT — TONOMETRY
OD_IOP_MMHG: 14
IOP_METHOD: GOLDMANN APPLANATION

## 2023-10-13 ASSESSMENT — EXTERNAL EXAM - LEFT EYE: OS_EXAM: NORMAL

## 2023-10-13 ASSESSMENT — VISUAL ACUITY
OD_SC: CF@2'
METHOD: SNELLEN - LINEAR

## 2023-10-13 ASSESSMENT — EXTERNAL EXAM - RIGHT EYE: OD_EXAM: NORMAL

## 2023-10-13 ASSESSMENT — SLIT LAMP EXAM - LIDS
COMMENTS: NORMAL
COMMENTS: NORMAL

## 2023-10-13 ASSESSMENT — ENCOUNTER SYMPTOMS: EYES NEGATIVE: 1

## 2023-10-13 ASSESSMENT — PAIN SCALES - GENERAL: PAINLEVEL_OUTOF10: 0 - NO PAIN

## 2023-10-13 NOTE — PROGRESS NOTES
Macular hole right eye (OD) FTMH  Start Tobradex QID  PF QID    Macular hole sealed right eye (OD)    Cotninue PF and tobradex    RTC 2weeks

## 2023-10-19 ENCOUNTER — APPOINTMENT (OUTPATIENT)
Dept: OPHTHALMOLOGY | Facility: CLINIC | Age: 73
End: 2023-10-19
Payer: MEDICARE

## 2023-10-23 ENCOUNTER — LAB (OUTPATIENT)
Dept: LAB | Facility: LAB | Age: 73
End: 2023-10-23
Payer: MEDICARE

## 2023-10-23 DIAGNOSIS — R73.09 OTHER ABNORMAL GLUCOSE: ICD-10-CM

## 2023-10-23 DIAGNOSIS — E78.5 HYPERLIPIDEMIA, UNSPECIFIED: ICD-10-CM

## 2023-10-23 DIAGNOSIS — M85.80 OTHER SPECIFIED DISORDERS OF BONE DENSITY AND STRUCTURE, UNSPECIFIED SITE: Primary | ICD-10-CM

## 2023-10-23 DIAGNOSIS — E55.9 VITAMIN D DEFICIENCY, UNSPECIFIED: ICD-10-CM

## 2023-10-23 LAB
25(OH)D3 SERPL-MCNC: 46 NG/ML (ref 31–100)
ALBUMIN SERPL-MCNC: 4.2 G/DL (ref 3.5–5)
ALP BLD-CCNC: 80 U/L (ref 35–125)
ALT SERPL-CCNC: 12 U/L (ref 5–40)
ANION GAP SERPL CALC-SCNC: 10 MMOL/L
AST SERPL-CCNC: 19 U/L (ref 5–40)
BILIRUB SERPL-MCNC: 0.6 MG/DL (ref 0.1–1.2)
BUN SERPL-MCNC: 8 MG/DL (ref 8–25)
CALCIUM SERPL-MCNC: 9.4 MG/DL (ref 8.5–10.4)
CHLORIDE SERPL-SCNC: 104 MMOL/L (ref 97–107)
CHOLEST SERPL-MCNC: 157 MG/DL (ref 133–200)
CHOLEST/HDLC SERPL: 2.7 {RATIO}
CO2 SERPL-SCNC: 28 MMOL/L (ref 24–31)
CREAT SERPL-MCNC: 0.8 MG/DL (ref 0.4–1.6)
ERYTHROCYTE [DISTWIDTH] IN BLOOD BY AUTOMATED COUNT: 14.9 % (ref 11.5–14.5)
EST. AVERAGE GLUCOSE BLD GHB EST-MCNC: 134 MG/DL
GFR SERPL CREATININE-BSD FRML MDRD: 77 ML/MIN/1.73M*2
GLUCOSE SERPL-MCNC: 106 MG/DL (ref 65–99)
HBA1C MFR BLD: 6.3 %
HCT VFR BLD AUTO: 41.2 % (ref 36–46)
HDLC SERPL-MCNC: 58 MG/DL
HGB BLD-MCNC: 12.7 G/DL (ref 12–16)
LDLC SERPL CALC-MCNC: 79 MG/DL (ref 65–130)
MCH RBC QN AUTO: 27.4 PG (ref 26–34)
MCHC RBC AUTO-ENTMCNC: 30.8 G/DL (ref 32–36)
MCV RBC AUTO: 89 FL (ref 80–100)
NRBC BLD-RTO: 0 /100 WBCS (ref 0–0)
PLATELET # BLD AUTO: 273 X10*3/UL (ref 150–450)
PMV BLD AUTO: 10.5 FL (ref 7.5–11.5)
POTASSIUM SERPL-SCNC: 4.5 MMOL/L (ref 3.4–5.1)
PROT SERPL-MCNC: 7.1 G/DL (ref 5.9–7.9)
RBC # BLD AUTO: 4.64 X10*6/UL (ref 4–5.2)
SODIUM SERPL-SCNC: 142 MMOL/L (ref 133–145)
TRIGL SERPL-MCNC: 102 MG/DL (ref 40–150)
WBC # BLD AUTO: 5.9 X10*3/UL (ref 4.4–11.3)

## 2023-10-23 PROCEDURE — 82306 VITAMIN D 25 HYDROXY: CPT

## 2023-10-23 PROCEDURE — 85027 COMPLETE CBC AUTOMATED: CPT

## 2023-10-23 PROCEDURE — 36415 COLL VENOUS BLD VENIPUNCTURE: CPT

## 2023-10-23 PROCEDURE — 80061 LIPID PANEL: CPT

## 2023-10-23 PROCEDURE — 83036 HEMOGLOBIN GLYCOSYLATED A1C: CPT

## 2023-10-23 PROCEDURE — 80053 COMPREHEN METABOLIC PANEL: CPT

## 2023-10-26 ENCOUNTER — OFFICE VISIT (OUTPATIENT)
Dept: OPHTHALMOLOGY | Facility: CLINIC | Age: 73
End: 2023-10-26
Payer: MEDICARE

## 2023-10-26 DIAGNOSIS — Z98.41 STATUS POST RIGHT CATARACT EXTRACTION: ICD-10-CM

## 2023-10-26 DIAGNOSIS — Z98.42 STATUS POST LEFT CATARACT EXTRACTION: ICD-10-CM

## 2023-10-26 DIAGNOSIS — Z96.1 PSEUDOPHAKIA OF BOTH EYES: ICD-10-CM

## 2023-10-26 PROBLEM — R73.09: Status: RESOLVED | Noted: 2023-09-02 | Resolved: 2023-10-26

## 2023-10-26 PROCEDURE — 99024 POSTOP FOLLOW-UP VISIT: CPT | Performed by: OPHTHALMOLOGY

## 2023-10-26 RX ORDER — ASCORBIC ACID 125 MG
TABLET,CHEWABLE ORAL EVERY 24 HOURS
COMMUNITY
End: 2023-10-27 | Stop reason: ALTCHOICE

## 2023-10-26 RX ORDER — NITROFURANTOIN 25; 75 MG/1; MG/1
CAPSULE ORAL EVERY 12 HOURS
COMMUNITY
Start: 2023-06-23 | End: 2023-10-27 | Stop reason: ALTCHOICE

## 2023-10-26 RX ORDER — DEXAMETHASONE 0.5 MG/5ML
SOLUTION ORAL
COMMUNITY
Start: 2023-09-19 | End: 2023-12-08 | Stop reason: ALTCHOICE

## 2023-10-26 RX ORDER — TOBRAMYCIN 3 MG/ML
SOLUTION/ DROPS OPHTHALMIC
COMMUNITY
Start: 2023-09-05 | End: 2023-10-27 | Stop reason: ALTCHOICE

## 2023-10-26 RX ORDER — SULFAMETHOXAZOLE AND TRIMETHOPRIM 800; 160 MG/1; MG/1
TABLET ORAL
COMMUNITY
Start: 2023-06-21 | End: 2023-10-27 | Stop reason: ALTCHOICE

## 2023-10-26 ASSESSMENT — PATIENT HEALTH QUESTIONNAIRE - PHQ9
1. LITTLE INTEREST OR PLEASURE IN DOING THINGS: NOT AT ALL
2. FEELING DOWN, DEPRESSED OR HOPELESS: NOT AT ALL
SUM OF ALL RESPONSES TO PHQ9 QUESTIONS 1 AND 2: 0

## 2023-10-26 ASSESSMENT — REFRACTION_WEARINGRX
OS_SPHERE: +1.25
OD_SPHERE: +1.25
SPECS_TYPE: READERS

## 2023-10-26 ASSESSMENT — PAIN SCALES - GENERAL: PAINLEVEL: 0-NO PAIN

## 2023-10-26 ASSESSMENT — KERATOMETRY
OS_K1POWER_DIOPTERS: 43.00
OS_AXISANGLE_DEGREES: 180
OS_AXISANGLE2_DEGREES: 90
OS_K2POWER_DIOPTERS: 43.50
OD_K1POWER_DIOPTERS: UNABLE

## 2023-10-26 ASSESSMENT — SLIT LAMP EXAM - LIDS
COMMENTS: NORMAL
COMMENTS: NORMAL

## 2023-10-26 ASSESSMENT — VISUAL ACUITY
OD_SC: 20/400
METHOD: SNELLEN - LINEAR
OS_SC: 20/40
OS_PH_SC: 20/25
OS_SC+: -1

## 2023-10-26 ASSESSMENT — ENCOUNTER SYMPTOMS
MUSCULOSKELETAL NEGATIVE: 0
GASTROINTESTINAL NEGATIVE: 0
ENDOCRINE NEGATIVE: 0
HEMATOLOGIC/LYMPHATIC NEGATIVE: 0
RESPIRATORY NEGATIVE: 0
PSYCHIATRIC NEGATIVE: 0
CONSTITUTIONAL NEGATIVE: 0
NEUROLOGICAL NEGATIVE: 0
LOSS OF SENSATION IN FEET: 0
DEPRESSION: 0
OCCASIONAL FEELINGS OF UNSTEADINESS: 0
ALLERGIC/IMMUNOLOGIC NEGATIVE: 0
EYES NEGATIVE: 0
CARDIOVASCULAR NEGATIVE: 0

## 2023-10-26 ASSESSMENT — EXTERNAL EXAM - RIGHT EYE: OD_EXAM: NORMAL

## 2023-10-26 ASSESSMENT — EXTERNAL EXAM - LEFT EYE: OS_EXAM: NORMAL

## 2023-10-26 NOTE — PROGRESS NOTES
[facility]: [facility]  PROGRESS NOTE      Sally A Behanna is a 72 y.o. female that is presenting today for No chief complaint on file..    Assessment/Plan   Diagnoses and all orders for this visit:  Bilateral carotid artery stenosis  Degeneration of lumbar or lumbosacral intervertebral disc  Mixed hyperlipidemia  Gastroesophageal reflux disease without esophagitis  Hollenhorst plaque, right eye  Mitral valve insufficiency, unspecified etiology  Osteopenia, unspecified location  Vitamin D deficiency  We reviewed her current medical problems and situation together.  Her blood work really looks very excellent.  Her blood pressure and cholesterol are both at a good level.  She will continue her follow-up with Dr. Amaya regarding her carotid arteries and with Dr. Vanegas for her eyes.  She will be due for a mammogram over the winter and this is ordered for her as well.  Subjective   Here to follow up for her chronic medical issues - those have been pretty stable but pt dealing with some visual issues/ retnal hole with a procedure        Review of Systems   Respiratory: Negative.     Cardiovascular: Negative.    Gastrointestinal: Negative.    Endocrine: Negative.    Genitourinary: Negative.       Objective   There were no vitals filed for this visit.   There is no height or weight on file to calculate BMI.  Physical Exam  Constitutional:       Appearance: Normal appearance.   HENT:      Mouth/Throat:      Mouth: Mucous membranes are moist.   Cardiovascular:      Rate and Rhythm: Normal rate and regular rhythm.      Pulses: Normal pulses.      Heart sounds: Normal heart sounds.   Pulmonary:      Effort: Pulmonary effort is normal.      Breath sounds: Normal breath sounds.   Abdominal:      General: Abdomen is flat. Bowel sounds are normal.      Palpations: Abdomen is soft.   Skin:     General: Skin is warm.   Neurological:      General: No focal deficit present.      Mental Status: She is alert and  "oriented to person, place, and time.       Diagnostic Results   Lab Results   Component Value Date    GLUCOSE 106 (H) 10/23/2023    CALCIUM 9.4 10/23/2023     10/23/2023    K 4.5 10/23/2023    CO2 28 10/23/2023     10/23/2023    BUN 8 10/23/2023    CREATININE 0.80 10/23/2023     Lab Results   Component Value Date    ALT 12 10/23/2023    AST 19 10/23/2023    ALKPHOS 80 10/23/2023    BILITOT 0.6 10/23/2023     Lab Results   Component Value Date    WBC 5.9 10/23/2023    HGB 12.7 10/23/2023    HCT 41.2 10/23/2023    MCV 89 10/23/2023     10/23/2023     Lab Results   Component Value Date    CHOL 157 10/23/2023    CHOL 185 03/24/2023    CHOL 210 (H) 09/03/2022     Lab Results   Component Value Date    HDL 58.0 10/23/2023    HDL 54 03/24/2023    HDL 58 09/03/2022     Lab Results   Component Value Date    LDLCALC 79 10/23/2023    LDLCALC 107 03/24/2023    LDLCALC 128 09/03/2022     Lab Results   Component Value Date    TRIG 102 10/23/2023    TRIG 121 03/24/2023    TRIG 118 09/03/2022     No components found for: \"CHOLHDL\"  Lab Results   Component Value Date    HGBA1C 6.3 (H) 10/23/2023     Other labs not included in the list above were reviewed either before or during this encounter.    History    Past Medical History:   Diagnosis Date    Autoimmune disorder (CMS/HCC)     Lichen Planus    Cataract     Delayed emergence from general anesthesia     Dry eye syndrome of unspecified lacrimal gland 05/17/2017    Dry eye syndrome    GERD (gastroesophageal reflux disease)     Heart valve disease     mitral valve prolapse with \"some\" regurgitation    Hordeolum externum unspecified eye, unspecified eyelid 05/17/2017    Stye    Hyperlipidemia     Osteopenia     Personal history of diseases of the skin and subcutaneous tissue     History of lichen planus    Pseudophakia     Status post bilateral cataract extraction     Toxic effect of venom of bees, accidental (unintentional), initial encounter 05/17/2017    Bee " "sting reaction     Past Surgical History:   Procedure Laterality Date    CATARACT EXTRACTION      bilateral cataract surgery    OTHER SURGICAL HISTORY  10/24/2022    right ovary removed    OTHER SURGICAL HISTORY  10/24/2022    OTHER SURGICAL HISTORY  10/24/2022    Cardiac catheterization    OTHER SURGICAL HISTORY  10/24/2022    Cholecystectomy    OTHER SURGICAL HISTORY      teeth pulled under anesthesia     Family History   Problem Relation Name Age of Onset    Diabetes Mother      Heart disease Mother      Heart disease Father      No Known Problems Brother      Cancer Son       Social History     Socioeconomic History    Marital status:      Spouse name: Not on file    Number of children: Not on file    Years of education: Not on file    Highest education level: Not on file   Occupational History    Not on file   Tobacco Use    Smoking status: Never    Smokeless tobacco: Never   Vaping Use    Vaping Use: Never used   Substance and Sexual Activity    Alcohol use: Yes     Comment: has 1-2 drinks once a month.    Drug use: Never    Sexual activity: Not on file   Other Topics Concern    Not on file   Social History Narrative    Not on file     Social Determinants of Health     Financial Resource Strain: Not on file   Food Insecurity: Not on file   Transportation Needs: Not on file   Physical Activity: Not on file   Stress: Not on file   Social Connections: Not on file   Intimate Partner Violence: Not on file   Housing Stability: Not on file     Allergies   Allergen Reactions    Mercury Other and Unknown     \"Blister\"    Acrylic Acid Unknown     Pt doesn't remember reaction     Amoxicillin-Pot Clavulanate Nausea Only, Unknown and Other     nauseated    Balsam [place] Other     Blisters on tongue     Codeine Unknown and Other     Pt doesn't remember reaction    feels like she is floating    Iodinated Contrast Media Hives    Latex Swelling and Unknown    Menthol Unknown     Pt doesn't remember reaction    " "Sulfamethoxazole-Trimethoprim Unknown     Pt doesn't remember reaction    Bacitracin Rash    Ciprofloxacin Nausea Only and Unknown    Gold Salts Other     \"Redness/Erythema\"     Current Outpatient Medications on File Prior to Visit   Medication Sig Dispense Refill    aspirin 81 mg EC tablet Take 1 tablet (81 mg) by mouth once daily.      Bactrim -160 mg tablet 1 tablet Orally Twice Day for 5 days      biotin 5,000 mcg tablet,chewable once every 24 hours.      biotin-lutein (Biotin Plus) 5,000 mcg- 10 mg tablet Take 1 tablet by mouth once daily.      cholecalciferol (Vitamin D-3) 25 MCG (1000 UT) capsule Take 1 capsule (25 mcg) by mouth once daily.      ciprofloxacin (Ciloxan) 0.3 % ophthalmic solution Administer 1 drop into the left eye 4 times a day.      dexAMETHasone 0.5 mg/5 mL solution SWISH AND SPIT 1/2 OZ 3 - 4 TIMES A DAY      esomeprazole (NexIUM) 20 mg DR capsule Take 2 capsules (40 mg) by mouth once daily in the morning. Take before meals.      ibuprofen (Motrin IB) 200 mg tablet Take 1 tablet (200 mg) by mouth 3 times a day as needed for mild pain (1 - 3).      ketorolac (Acular) 0.5 % ophthalmic solution Administer 1 drop into the left eye 4 times a day.      Lipitor 80 mg tablet Take 1 tablet (80 mg) by mouth once daily.      nitrofurantoin, macrocrystal-monohydrate, (Macrobid) 100 mg capsule every 12 hours.      prednisoLONE acetate (Pred-Forte) 1 % ophthalmic suspension Administer 1 drop into the left eye 4 times a day.      prednisoLONE acetate (Pred-Forte) 1 % ophthalmic suspension Administer 1 drop into the right eye 4 times a day for 14 days. 3 mL 0    tobramycin (Tobrex) 0.3 % ophthalmic solution INSTILL 1 DROP FOUR TIMES A DAY IN SURGICAL EYE STARTING 3 DAYS BEFORE SURGERY       No current facility-administered medications on file prior to visit.     Immunization History   Administered Date(s) Administered    DT (pediatric) 05/07/2006    Flu vaccine, quadrivalent, high-dose, preservative " free, age 65y+ (FLUZONE) 11/02/2020, 11/01/2021, 10/26/2022    Influenza, High Dose Seasonal, Preservative Free 10/06/2016, 09/27/2018, 09/25/2019    Influenza, injectable, quadrivalent 10/10/2014, 12/16/2015, 11/06/2017    Influenza, seasonal, injectable 10/28/2011, 10/19/2012, 11/01/2013    Pfizer Purple Cap SARS-CoV-2 03/17/2021, 04/16/2021    Pneumococcal conjugate vaccine, 13-valent (PREVNAR 13) 05/05/2017    Pneumococcal polysaccharide vaccine, 23-valent, age 2 years and older (PNEUMOVAX 23) 11/27/2013, 01/08/2021    Tdap vaccine, age 7 year and older (BOOSTRIX) 10/06/2016, 07/03/2019    Zoster vaccine, recombinant, adult (SHINGRIX) 06/15/2022    Zoster, live 02/07/2011     Patient's medical history was reviewed and updated either before or during this encounter.    Gabino Mayorga MD

## 2023-10-26 NOTE — ASSESSMENT & PLAN NOTE
Continues to heal well. Good result with refraction. Will advise hold on filling RX until after right eye (OD) has healed more, can send if interested in filling.

## 2023-10-26 NOTE — ASSESSMENT & PLAN NOTE
Stable intraocular lens (IOL) both eyes (OU) but showing some inferior capture of optic right eye (OD) after vitrectomy. Will update retina colleague. Plan to monitor with serial exam.

## 2023-10-26 NOTE — PATIENT INSTRUCTIONS
Thank you so much for choosing me to provide your care today!    If you were dilated your vision may remain blurry   or light sensitive for several hours.    The nature of eye and vision problems can require frequent follow up, please make every effort to adhere to any future appointments.    If you have any issues, questions, or concerns,   please do not hesitate to reach out.    If you receive a survey in regards to your care today, please mention any exceptional care my office staff and/or technicians provided.    You can reach our office at this number:  [phone]

## 2023-10-26 NOTE — ASSESSMENT & PLAN NOTE
Continues to heal well, on surgical drops for vitrectomy. Unsure if needs to taper prednisolone, instructed to continue QID and will ask for clarification from retina surgeon.

## 2023-10-26 NOTE — Clinical Note
Seen today for post op cataract. Doing well left eye (OS) and great BCVA. Still recovering from your vitrectomy and she is due to see you in 1 week. She mentioned that her instruction was to discontinue her steroid after 14 days. I was concerned about rebound inflammation and advised to stay on that QID until clarifying with you. She does demonstrate some optic capture at the iris today as well. Thanks!  CD

## 2023-10-26 NOTE — PROGRESS NOTES
Assessment/Plan   Problem List Items Addressed This Visit          Eye/Vision problems    Pseudophakia of both eyes     Stable intraocular lens (IOL) both eyes (OU) but showing some inferior capture of optic right eye (OD) after vitrectomy. Will update retina colleague. Plan to monitor with serial exam.          Status post right cataract extraction     Continues to heal well, on surgical drops for vitrectomy. Unsure if needs to taper prednisolone, instructed to continue QID and will ask for clarification from retina surgeon.          Status post left cataract extraction     Continues to heal well. Good result with refraction. Will advise hold on filling RX until after right eye (OD) has healed more, can send if interested in filling.             Provided reassurance regarding above diagnoses and care received in the office visit today. Discussed outcomes and options along with the importance of treatment compliance. Understands the importance of any follow up visits. Patient instructed to call/communicate with our office if any new issues, questions, or concerns.     Will plan to see back in 1 month for refraction and short check or sooner PRN

## 2023-10-27 ENCOUNTER — OFFICE VISIT (OUTPATIENT)
Dept: PRIMARY CARE | Facility: CLINIC | Age: 73
End: 2023-10-27
Payer: MEDICARE

## 2023-10-27 VITALS
BODY MASS INDEX: 21.77 KG/M2 | DIASTOLIC BLOOD PRESSURE: 78 MMHG | OXYGEN SATURATION: 99 % | SYSTOLIC BLOOD PRESSURE: 144 MMHG | WEIGHT: 139 LBS | TEMPERATURE: 97.1 F | HEART RATE: 74 BPM

## 2023-10-27 DIAGNOSIS — Z12.31 OTHER SCREENING MAMMOGRAM: ICD-10-CM

## 2023-10-27 DIAGNOSIS — I65.23 BILATERAL CAROTID ARTERY STENOSIS: Primary | ICD-10-CM

## 2023-10-27 DIAGNOSIS — H34.211 HOLLENHORST PLAQUE, RIGHT EYE: ICD-10-CM

## 2023-10-27 DIAGNOSIS — M85.80 OSTEOPENIA, UNSPECIFIED LOCATION: ICD-10-CM

## 2023-10-27 DIAGNOSIS — E55.9 VITAMIN D DEFICIENCY: ICD-10-CM

## 2023-10-27 DIAGNOSIS — Z01.89 ENCOUNTER FOR ROUTINE LABORATORY TESTING: ICD-10-CM

## 2023-10-27 DIAGNOSIS — I34.0 MITRAL VALVE INSUFFICIENCY, UNSPECIFIED ETIOLOGY: ICD-10-CM

## 2023-10-27 DIAGNOSIS — E78.2 MIXED HYPERLIPIDEMIA: ICD-10-CM

## 2023-10-27 DIAGNOSIS — R73.9 HYPERGLYCEMIA: ICD-10-CM

## 2023-10-27 DIAGNOSIS — K21.9 GASTROESOPHAGEAL REFLUX DISEASE WITHOUT ESOPHAGITIS: ICD-10-CM

## 2023-10-27 DIAGNOSIS — M51.37 DEGENERATION OF LUMBAR OR LUMBOSACRAL INTERVERTEBRAL DISC: ICD-10-CM

## 2023-10-27 PROCEDURE — 1126F AMNT PAIN NOTED NONE PRSNT: CPT | Performed by: INTERNAL MEDICINE

## 2023-10-27 PROCEDURE — 1159F MED LIST DOCD IN RCRD: CPT | Performed by: INTERNAL MEDICINE

## 2023-10-27 PROCEDURE — 99214 OFFICE O/P EST MOD 30 MIN: CPT | Performed by: INTERNAL MEDICINE

## 2023-10-27 PROCEDURE — G0008 ADMIN INFLUENZA VIRUS VAC: HCPCS | Performed by: INTERNAL MEDICINE

## 2023-10-27 PROCEDURE — 1160F RVW MEDS BY RX/DR IN RCRD: CPT | Performed by: INTERNAL MEDICINE

## 2023-10-27 PROCEDURE — 90662 IIV NO PRSV INCREASED AG IM: CPT | Performed by: INTERNAL MEDICINE

## 2023-10-27 PROCEDURE — 1036F TOBACCO NON-USER: CPT | Performed by: INTERNAL MEDICINE

## 2023-10-27 RX ORDER — NICOTINE POLACRILEX 2 MG
1 GUM BUCCAL EVERY 24 HOURS
COMMUNITY

## 2023-10-27 RX ORDER — HYDROGEN PEROXIDE 3 %
22 SOLUTION, NON-ORAL MISCELLANEOUS 2 TIMES DAILY
COMMUNITY
End: 2023-10-27 | Stop reason: ALTCHOICE

## 2023-10-27 ASSESSMENT — ENCOUNTER SYMPTOMS
CARDIOVASCULAR NEGATIVE: 1
OCCASIONAL FEELINGS OF UNSTEADINESS: 1
GASTROINTESTINAL NEGATIVE: 1
ENDOCRINE NEGATIVE: 1
DEPRESSION: 0
LOSS OF SENSATION IN FEET: 0
RESPIRATORY NEGATIVE: 1

## 2023-10-27 ASSESSMENT — PATIENT HEALTH QUESTIONNAIRE - PHQ9
2. FEELING DOWN, DEPRESSED OR HOPELESS: NOT AT ALL
1. LITTLE INTEREST OR PLEASURE IN DOING THINGS: NOT AT ALL
SUM OF ALL RESPONSES TO PHQ9 QUESTIONS 1 AND 2: 0

## 2023-10-27 NOTE — PATIENT INSTRUCTIONS
I was sorry to hear you are having so many trouble with your eyes but hopefully you are on the upswing now.  As you know we reviewed your blood work and it looks excellent.  Continue your medications like you are currently taking them and I will plan on seeing you back in about 6 months with labs before your next visit.  I also ordered a mammogram for the winter.  You know we got your flu shot taking care of.

## 2023-10-28 DIAGNOSIS — Z98.41 STATUS POST RIGHT CATARACT EXTRACTION: Primary | ICD-10-CM

## 2023-10-28 RX ORDER — PREDNISOLONE ACETATE 10 MG/ML
1 SUSPENSION/ DROPS OPHTHALMIC 4 TIMES DAILY
Qty: 3 ML | Refills: 0 | Status: SHIPPED | OUTPATIENT
Start: 2023-10-28 | End: 2023-11-11

## 2023-11-01 ENCOUNTER — APPOINTMENT (OUTPATIENT)
Dept: RADIOLOGY | Facility: HOSPITAL | Age: 73
End: 2023-11-01
Payer: MEDICARE

## 2023-11-01 ENCOUNTER — HOSPITAL ENCOUNTER (EMERGENCY)
Facility: HOSPITAL | Age: 73
Discharge: HOME | End: 2023-11-02
Attending: STUDENT IN AN ORGANIZED HEALTH CARE EDUCATION/TRAINING PROGRAM
Payer: MEDICARE

## 2023-11-01 VITALS
TEMPERATURE: 99.1 F | HEART RATE: 68 BPM | BODY MASS INDEX: 21.82 KG/M2 | WEIGHT: 139 LBS | SYSTOLIC BLOOD PRESSURE: 183 MMHG | RESPIRATION RATE: 14 BRPM | DIASTOLIC BLOOD PRESSURE: 57 MMHG | OXYGEN SATURATION: 98 % | HEIGHT: 67 IN

## 2023-11-01 DIAGNOSIS — M79.601 PAIN OF RIGHT UPPER EXTREMITY: Primary | ICD-10-CM

## 2023-11-01 PROCEDURE — 73030 X-RAY EXAM OF SHOULDER: CPT | Mod: RT,FY

## 2023-11-01 PROCEDURE — 99284 EMERGENCY DEPT VISIT MOD MDM: CPT | Mod: 25 | Performed by: STUDENT IN AN ORGANIZED HEALTH CARE EDUCATION/TRAINING PROGRAM

## 2023-11-01 PROCEDURE — 96372 THER/PROPH/DIAG INJ SC/IM: CPT | Performed by: STUDENT IN AN ORGANIZED HEALTH CARE EDUCATION/TRAINING PROGRAM

## 2023-11-01 PROCEDURE — 99283 EMERGENCY DEPT VISIT LOW MDM: CPT | Mod: 25 | Performed by: STUDENT IN AN ORGANIZED HEALTH CARE EDUCATION/TRAINING PROGRAM

## 2023-11-01 RX ORDER — PREDNISONE 20 MG/1
20 TABLET ORAL DAILY
Qty: 3 TABLET | Refills: 0 | OUTPATIENT
Start: 2023-11-01 | End: 2023-11-01

## 2023-11-01 RX ORDER — PREDNISONE 20 MG/1
20 TABLET ORAL DAILY
Qty: 3 TABLET | Refills: 0 | Status: SHIPPED | OUTPATIENT
Start: 2023-11-01 | End: 2023-11-01

## 2023-11-01 RX ORDER — PREDNISONE 20 MG/1
20 TABLET ORAL DAILY
Qty: 3 TABLET | Refills: 0 | Status: SHIPPED | OUTPATIENT
Start: 2023-11-01 | End: 2023-11-01 | Stop reason: SDUPTHER

## 2023-11-01 ASSESSMENT — COLUMBIA-SUICIDE SEVERITY RATING SCALE - C-SSRS
2. HAVE YOU ACTUALLY HAD ANY THOUGHTS OF KILLING YOURSELF?: NO
6. HAVE YOU EVER DONE ANYTHING, STARTED TO DO ANYTHING, OR PREPARED TO DO ANYTHING TO END YOUR LIFE?: NO
1. IN THE PAST MONTH, HAVE YOU WISHED YOU WERE DEAD OR WISHED YOU COULD GO TO SLEEP AND NOT WAKE UP?: NO

## 2023-11-01 ASSESSMENT — PAIN DESCRIPTION - ORIENTATION: ORIENTATION: RIGHT

## 2023-11-01 ASSESSMENT — PAIN SCALES - GENERAL: PAINLEVEL_OUTOF10: 10 - WORST POSSIBLE PAIN

## 2023-11-01 ASSESSMENT — PAIN DESCRIPTION - FREQUENCY: FREQUENCY: CONSTANT/CONTINUOUS

## 2023-11-01 ASSESSMENT — PAIN - FUNCTIONAL ASSESSMENT: PAIN_FUNCTIONAL_ASSESSMENT: 0-10

## 2023-11-01 ASSESSMENT — PAIN DESCRIPTION - DIRECTION: RADIATING_TOWARDS: DOWN THE ARM

## 2023-11-01 ASSESSMENT — PAIN DESCRIPTION - DESCRIPTORS: DESCRIPTORS: SHARP

## 2023-11-01 ASSESSMENT — PAIN DESCRIPTION - PAIN TYPE: TYPE: ACUTE PAIN

## 2023-11-01 ASSESSMENT — PAIN DESCRIPTION - LOCATION: LOCATION: ARM

## 2023-11-02 ENCOUNTER — TELEPHONE (OUTPATIENT)
Dept: PRIMARY CARE | Facility: CLINIC | Age: 73
End: 2023-11-02

## 2023-11-02 ENCOUNTER — APPOINTMENT (OUTPATIENT)
Dept: OPHTHALMOLOGY | Facility: CLINIC | Age: 73
End: 2023-11-02
Payer: MEDICARE

## 2023-11-02 PROCEDURE — 2500000004 HC RX 250 GENERAL PHARMACY W/ HCPCS (ALT 636 FOR OP/ED): Performed by: STUDENT IN AN ORGANIZED HEALTH CARE EDUCATION/TRAINING PROGRAM

## 2023-11-02 RX ORDER — KETOROLAC TROMETHAMINE 30 MG/ML
15 INJECTION, SOLUTION INTRAMUSCULAR; INTRAVENOUS ONCE
Status: COMPLETED | OUTPATIENT
Start: 2023-11-02 | End: 2023-11-02

## 2023-11-02 RX ORDER — PREDNISONE 50 MG/1
50 TABLET ORAL ONCE
Status: COMPLETED | OUTPATIENT
Start: 2023-11-02 | End: 2023-11-02

## 2023-11-02 RX ORDER — PREDNISONE 50 MG/1
50 TABLET ORAL DAILY
Qty: 5 TABLET | Refills: 0 | Status: SHIPPED | OUTPATIENT
Start: 2023-11-02 | End: 2023-11-07

## 2023-11-02 RX ADMIN — PREDNISONE 50 MG: 50 TABLET ORAL at 00:36

## 2023-11-02 RX ADMIN — KETOROLAC TROMETHAMINE 15 MG: 30 INJECTION, SOLUTION INTRAMUSCULAR at 00:35

## 2023-11-02 NOTE — ED TRIAGE NOTES
PT presents to the ED with c/c of Right shoulder and arm pain since yesterday. Pt states that she did exert herself yesterday evening and today with worsening pain. Pt states that she is not able to lift the arm.

## 2023-11-02 NOTE — ED PROVIDER NOTES
"HPI   Chief Complaint   Patient presents with    Arm Injury     PT presents to the ED with c/c of Right shoulder and arm pain since yesterday. Pt states that she did exert herself yesterday evening and today with worsening pain. Pt states that she is not able to lift the arm.        Patient presents with right shoulder pain.  She states that she was moving a heavy window yesterday and while she did not feel any sudden severe pain, she had gradually progressive pain in the shoulder since then.  Today, the pain has become much worse and she has severe pain whenever she tries to move the shoulder.  She has been using ibuprofen with some help.                          No data recorded                Patient History   Past Medical History:   Diagnosis Date    Autoimmune disorder (CMS/[facility])     Lichen Planus    Bilateral carotid artery stenosis 09/02/2023    Carotid artery disease (CMS/[facility]) 09/02/2023    Cataract     Delayed emergence from general anesthesia     Dry eye syndrome of unspecified lacrimal gland 05/17/2017    Dry eye syndrome    Dyslipidemia 09/02/2023    GERD (gastroesophageal reflux disease)     Heart valve disease     mitral valve prolapse with \"some\" regurgitation    Hollenhorst plaque, 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    Hordeolum externum unspecified eye, unspecified eyelid 05/17/2017    Stye    Hyperlipidemia     Impaired glucose tolerance 09/02/2023    Mitral valve regurgitation 09/02/2023    Osteopenia     Osteopenia 09/02/2023    Personal history of diseases of the skin and subcutaneous tissue     History of lichen planus    Pseudophakia     Status post bilateral cataract extraction     Toxic effect of venom of bees, accidental (unintentional), initial encounter 05/17/2017    Bee sting reaction    Vitamin D deficiency 09/02/2023     Past Surgical History:   Procedure Laterality Date    CATARACT EXTRACTION      bilateral cataract surgery    CHOLECYSTECTOMY      COLONOSCOPY  2003    COLONOSCOPY  2012    " COLONOSCOPY  2014    ESOPHAGOGASTRODUODENOSCOPY  2012    ESOPHAGOGASTRODUODENOSCOPY  2014    MOUTH SURGERY  2018    OOPHORECTOMY Right     OTHER SURGICAL HISTORY  10/24/2022    right ovary removed    OTHER SURGICAL HISTORY  10/24/2022    OTHER SURGICAL HISTORY  10/24/2022    Cardiac catheterization    OTHER SURGICAL HISTORY  10/24/2022    Cholecystectomy    OTHER SURGICAL HISTORY      teeth pulled under anesthesia     Family History   Problem Relation Name Age of Onset    Diabetes Mother      Heart disease Mother      Heart disease Father      No Known Problems Brother      Cancer Son       Social History     Tobacco Use    Smoking status: Never    Smokeless tobacco: Never   Vaping Use    Vaping Use: Never used   Substance Use Topics    Alcohol use: Yes     Comment: has 1-2 drinks once a month.    Drug use: Never       Physical Exam   ED Triage Vitals [11/01/23 2220]   Temp Heart Rate Resp BP   37.3 °C (99.1 °F) 68 14 (!) 183/57      SpO2 Temp Source Heart Rate Source Patient Position   98 % Oral Monitor Sitting      BP Location FiO2 (%)     Left arm --       Physical Exam  Cardiovascular:      Comments: 2+ right radial pulse, regular  Musculoskeletal:      Comments: Pain with palpation of lateral aspect of right shoulder.  Range of active motion is severely limited by pain, however patient is able to passively flex and abduct shoulder to about 90 degrees, beyond this, she starts to encounter pain.  When she moves the elbow, there is pain in the upper arm as well.   Neurological:      Comments: Distal motor and sensation intact in radial, ulnar, and median distributions of the right arm         ED Course & MDM   Diagnoses as of 11/01/23 9035   Pain of right upper extremity       Medical Decision Making  Septic arthritis felt to be very unlikely in setting of no fever with reasonable range of motion. Patient signed out to Dr Marie pending xray with plan for likely steroids and follow up with Orthopedics.  Parts of  this chart were completed with dictation software, please excuse any errors in transcription.        Procedure  Procedures     Nolan Grigsby MD  11/01/23 7218

## 2023-11-02 NOTE — TELEPHONE ENCOUNTER
Patient states that she was seen in ED for severe R shoulder pain last night. Diagnosed with possible bursitis and advised to see ortho. Patient states that she saw Dr. Caicedo once before, but would like to know if there is another dr that you would recommend? Please advise.

## 2023-11-02 NOTE — DISCHARGE INSTRUCTIONS
You have been given a prescription for steroids for shoulder pain.  If you wear your sling, you should perform range of motion exercises with your shoulder several times per day.  You should follow-up with your orthopedic specialist.  Return to the emergency department with any worsening symptoms.    It is important to remember that your care does not end here and you must continue to monitor your condition closely. Please return to the emergency department for any worsening or concerning signs or symptoms as directed by our conversations and the discharge instructions. If you do not have a doctor please contact the referral number on your discharge instructions. Please contact any physician specialists provided in your discharge notes as it is very important to follow up with them regarding your condition. If you are unable to reach the physicians provided, please come back to the Emergency Department at any time.    Return to emergency room without delay for ANY new or worsening pains or for any other symptoms or concerns.  Return with worsening pains, nausea, vomiting, trouble breathing, palpitations, shortness of breath, inability to pass stool or urine, loss of control of stool or urine, any numbness or tingling (that is not normal for you), uncontrolled fevers, the passing of blood or other material in stool or urine, rashes, pains or for any other symptoms or concerns you may have.  You are always welcome to return to the ER at any time for any reason or for any other concerns you may have.

## 2023-11-03 DIAGNOSIS — E78.5 DYSLIPIDEMIA: ICD-10-CM

## 2023-11-03 RX ORDER — ATORVASTATIN CALCIUM 80 MG/1
80 TABLET, FILM COATED ORAL DAILY
Qty: 90 TABLET | Refills: 3 | Status: SHIPPED | OUTPATIENT
Start: 2023-11-03 | End: 2024-01-19

## 2023-11-07 ENCOUNTER — OFFICE VISIT (OUTPATIENT)
Dept: OPHTHALMOLOGY | Facility: CLINIC | Age: 73
End: 2023-11-07
Payer: MEDICARE

## 2023-11-07 DIAGNOSIS — H35.9 RETINAL DISORDERS: Primary | ICD-10-CM

## 2023-11-07 DIAGNOSIS — H35.341 MACULAR HOLE, RIGHT EYE: ICD-10-CM

## 2023-11-07 DIAGNOSIS — H43.823 VITREOMACULAR TRACTION SYNDROME OF BOTH EYES: ICD-10-CM

## 2023-11-07 PROCEDURE — 99024 POSTOP FOLLOW-UP VISIT: CPT | Performed by: OPHTHALMOLOGY

## 2023-11-07 PROCEDURE — 92134 CPTRZ OPH DX IMG PST SGM RTA: CPT | Mod: BILATERAL PROCEDURE | Performed by: OPHTHALMOLOGY

## 2023-11-07 RX ORDER — TIMOLOL MALEATE 5 MG/ML
1 SOLUTION/ DROPS OPHTHALMIC 2 TIMES DAILY
Qty: 3 ML | Refills: 1 | Status: SHIPPED | OUTPATIENT
Start: 2023-11-07 | End: 2023-11-09 | Stop reason: SDUPTHER

## 2023-11-07 ASSESSMENT — EXTERNAL EXAM - LEFT EYE: OS_EXAM: NORMAL

## 2023-11-07 ASSESSMENT — CONF VISUAL FIELD
OD_SUPERIOR_NASAL_RESTRICTION: 0
OS_NORMAL: 1
OD_NORMAL: 1
OS_SUPERIOR_NASAL_RESTRICTION: 0
OS_INFERIOR_NASAL_RESTRICTION: 0
OD_INFERIOR_TEMPORAL_RESTRICTION: 0
OD_SUPERIOR_TEMPORAL_RESTRICTION: 0
OD_INFERIOR_NASAL_RESTRICTION: 0
OS_INFERIOR_TEMPORAL_RESTRICTION: 0
OS_SUPERIOR_TEMPORAL_RESTRICTION: 0

## 2023-11-07 ASSESSMENT — CUP TO DISC RATIO: OD_RATIO: 0.3

## 2023-11-07 ASSESSMENT — VISUAL ACUITY
OD_SC: 20/200+1
OS_SC: 20/30+1
METHOD: SNELLEN - LINEAR

## 2023-11-07 ASSESSMENT — SLIT LAMP EXAM - LIDS
COMMENTS: NORMAL
COMMENTS: NORMAL

## 2023-11-07 ASSESSMENT — TONOMETRY
OS_IOP_MMHG: 16
OD_IOP_MMHG: 21
IOP_METHOD: GOLDMANN APPLANATION

## 2023-11-07 ASSESSMENT — EXTERNAL EXAM - RIGHT EYE: OD_EXAM: NORMAL

## 2023-11-07 ASSESSMENT — ENCOUNTER SYMPTOMS: EYES NEGATIVE: 1

## 2023-11-07 NOTE — PROGRESS NOTES
Assessment/Plan   Diagnoses and all orders for this visit:  Retinal disorders  -     OCT, Retina - OU - Both Eyes  -     Color Fundus Photography - OU - Both Eyes  Vitreomacular traction syndrome of both eyes  -     OCT, Retina - OU - Both Eyes  -     Color Fundus Photography - OU - Both Eyes  Macular hole, right eye      Post-op 3 weeks 10/12/23   Mac hole sealed  Pred taper reviewed   See Dr GONG as needed (pt prefers)      FU 3m

## 2023-11-09 DIAGNOSIS — H35.9 RETINAL DISORDERS: ICD-10-CM

## 2023-11-09 DIAGNOSIS — H35.341 MACULAR HOLE, RIGHT EYE: ICD-10-CM

## 2023-11-09 DIAGNOSIS — H43.823 VITREOMACULAR TRACTION SYNDROME OF BOTH EYES: ICD-10-CM

## 2023-11-09 NOTE — ED PROVIDER NOTES
"HPI   Chief Complaint   Patient presents with    Arm Injury     PT presents to the ED with c/c of Right shoulder and arm pain since yesterday. Pt states that she did exert herself yesterday evening and today with worsening pain. Pt states that she is not able to lift the arm.        Care of the patient was signed out to me from previous attending.  She is a 73-year-old female presenting to the ED for evaluation of right shoulder and arm pain since yesterday after exerting herself throughout the day yesterday.  She is having trouble lifting the arm due to the pain.  She denies any other trauma or injury to the arm preceding her symptoms.  She has no associated chest pain, shortness of breath, no swelling or redness to the joints, no fevers, no infectious signs or symptoms.  Given the patient signed out to me pending results of imaging to exclude fracture.      History provided by:  Patient   used: No                        No data recorded                Patient History   Past Medical History:   Diagnosis Date    Autoimmune disorder (CMS/[facility])     Lichen Planus    Bilateral carotid artery stenosis 09/02/2023    Carotid artery disease (CMS/[facility]) 09/02/2023    Cataract     Delayed emergence from general anesthesia     Dry eye syndrome of unspecified lacrimal gland 05/17/2017    Dry eye syndrome    Dyslipidemia 09/02/2023    GERD (gastroesophageal reflux disease)     Heart valve disease     mitral valve prolapse with \"some\" regurgitation    Hollenhorst plaque, 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    Hordeolum externum unspecified eye, unspecified eyelid 05/17/2017    Stye    Hyperlipidemia     Impaired glucose tolerance 09/02/2023    Mitral valve regurgitation 09/02/2023    Osteopenia     Osteopenia 09/02/2023    Personal history of diseases of the skin and subcutaneous tissue     History of lichen planus    Pseudophakia     Status post bilateral cataract extraction     Toxic effect of venom of bees, accidental " (unintentional), initial encounter 05/17/2017    Bee sting reaction    Vitamin D deficiency 09/02/2023     Past Surgical History:   Procedure Laterality Date    CATARACT EXTRACTION      bilateral cataract surgery    CHOLECYSTECTOMY      COLONOSCOPY  2003    COLONOSCOPY  2012    COLONOSCOPY  2014    ESOPHAGOGASTRODUODENOSCOPY  2012    ESOPHAGOGASTRODUODENOSCOPY  2014    MOUTH SURGERY  2018    OOPHORECTOMY Right     OTHER SURGICAL HISTORY  10/24/2022    right ovary removed    OTHER SURGICAL HISTORY  10/24/2022    OTHER SURGICAL HISTORY  10/24/2022    Cardiac catheterization    OTHER SURGICAL HISTORY  10/24/2022    Cholecystectomy    OTHER SURGICAL HISTORY      teeth pulled under anesthesia     Family History   Problem Relation Name Age of Onset    Diabetes Mother      Heart disease Mother      Heart disease Father      No Known Problems Brother      Cancer Son       Social History     Tobacco Use    Smoking status: Never    Smokeless tobacco: Never   Vaping Use    Vaping Use: Never used   Substance Use Topics    Alcohol use: Yes     Comment: has 1-2 drinks once a month.    Drug use: Never       Physical Exam   ED Triage Vitals [11/01/23 2220]   Temp Heart Rate Resp BP   37.3 °C (99.1 °F) 68 14 (!) 183/57      SpO2 Temp Source Heart Rate Source Patient Position   98 % Oral Monitor Sitting      BP Location FiO2 (%)     Left arm --       XR shoulder right 2+ views   Final Result   No acute fracture or dislocation.        MACRO:   None.        Signed by: Marvin Cervantes 11/2/2023 7:47 AM   Dictation workstation:   [employer]          Physical Exam  General: well developed, well nourished elderly female who is awake and alert, in no apparent distress  HENT: normocephalic, atraumatic.   CV: regular rate and rhythm, no murmur, no gallops, or rubs. radial pulses +2/4 bilaterally  Resp: clear to ascultation bilaterally, no wheezes, rales, or rhonchi  MSK: strength +5/5 to upper and lower extremities bilaterally, no swelling  of the extremities.  Return to palpation over the right shoulder at the AC joint, she has worsening pain with abduction of the shoulder.  Neuro: Sensation fully intact.  Skin: warm, dry, without evidence of rash or abrasions    ED Course & MDM   Diagnoses as of 11/09/23 0614   Pain of right upper extremity       Medical Decision Making  PMH: Hyperlipidemia, lichen planus, GERD  History obtained: directly from patient   Social factors affecting disposition: none    She is in no acute distress here.  Physical exam shows tenderness over the AC joint and worsening pain with abduction of the shoulder.  X-ray shows no evidence of fracture or bony abnormality.  She is no infectious signs or symptoms, no erythema, warmth over the joints concerning for septic arthritis.  Presentation most consistent with bursitis of the right shoulder.  She was prescribed steroids and discharged from the ED in stable condition.  She is to follow-up as an outpatient for further medical management.  She was provided a sling for comfort as well.    Procedure  Procedures     Abad Marie,   11/09/23 0618

## 2023-11-10 ENCOUNTER — TELEPHONE (OUTPATIENT)
Dept: PRIMARY CARE | Facility: CLINIC | Age: 73
End: 2023-11-10
Payer: COMMERCIAL

## 2023-11-10 RX ORDER — TIMOLOL MALEATE 5 MG/ML
1 SOLUTION/ DROPS OPHTHALMIC 2 TIMES DAILY
Qty: 3 ML | Refills: 1 | Status: SHIPPED | OUTPATIENT
Start: 2023-11-10 | End: 2023-11-29 | Stop reason: ALTCHOICE

## 2023-11-10 NOTE — TELEPHONE ENCOUNTER
Patient came to office to speak with nurse. Patient received flu shot at in office appointment 11/7/23 in left deltoid. Patient Has a 4 cm x 6 cm red area at injection site and within that area is a 1.5 cm x 4 cm bright red irritated skin that is the shape of the bandaid.Patient states it looks better today and she has been putting atb oint on it. Notified patient to leave it open to air and monitor and if new or worsening symptoms call the office.

## 2023-11-13 NOTE — TELEPHONE ENCOUNTER
Patient called to complain of continues irritation at injection site. States that she was advised to let it dry out but now the skin is so dry that it hurts. Would like to know what dr recommends? Please advise.

## 2023-11-29 ENCOUNTER — OFFICE VISIT (OUTPATIENT)
Dept: OPHTHALMOLOGY | Facility: CLINIC | Age: 73
End: 2023-11-29
Payer: MEDICARE

## 2023-11-29 DIAGNOSIS — H52.7 REFRACTIVE ERROR: ICD-10-CM

## 2023-11-29 DIAGNOSIS — Z96.1 PSEUDOPHAKIA OF BOTH EYES: Primary | ICD-10-CM

## 2023-11-29 PROCEDURE — 99024 POSTOP FOLLOW-UP VISIT: CPT | Performed by: OPHTHALMOLOGY

## 2023-11-29 ASSESSMENT — ENCOUNTER SYMPTOMS
LOSS OF SENSATION IN FEET: 0
CONSTITUTIONAL NEGATIVE: 0
DEPRESSION: 0
ENDOCRINE NEGATIVE: 0
PSYCHIATRIC NEGATIVE: 0
OCCASIONAL FEELINGS OF UNSTEADINESS: 0
NEUROLOGICAL NEGATIVE: 0
EYES NEGATIVE: 0
RESPIRATORY NEGATIVE: 0
CARDIOVASCULAR NEGATIVE: 0
MUSCULOSKELETAL NEGATIVE: 0
GASTROINTESTINAL NEGATIVE: 0
ALLERGIC/IMMUNOLOGIC NEGATIVE: 0
HEMATOLOGIC/LYMPHATIC NEGATIVE: 0

## 2023-11-29 ASSESSMENT — EXTERNAL EXAM - LEFT EYE: OS_EXAM: NORMAL

## 2023-11-29 ASSESSMENT — KERATOMETRY
METHOD_AUTO_MANUAL: AUTOMATED
OS_K2POWER_DIOPTERS: 43.50
OD_K1POWER_DIOPTERS: 43.75
OS_K1POWER_DIOPTERS: 42.50
OS_AXISANGLE2_DEGREES: 95
OD_K2POWER_DIOPTERS: 44.50
OD_AXISANGLE_DEGREES: 5
OD_AXISANGLE2_DEGREES: 95
OS_AXISANGLE_DEGREES: 5

## 2023-11-29 ASSESSMENT — TONOMETRY
OS_IOP_MMHG: 13
OD_IOP_MMHG: 13
IOP_METHOD: GOLDMANN APPLANATION

## 2023-11-29 ASSESSMENT — REFRACTION_MANIFEST
OD_CYLINDER: -1.75
OS_AXIS: 085
OS_SPHERE: +0.25
OD_AXIS: 100
OS_CYLINDER: -1.50
METHOD_AUTOREFRACTION: 1
OD_SPHERE: -0.00

## 2023-11-29 ASSESSMENT — VISUAL ACUITY
OD_SC: 20/400
METHOD: SNELLEN - LINEAR
OS_SC+: +1
OS_SC: 20/30

## 2023-11-29 ASSESSMENT — SLIT LAMP EXAM - LIDS
COMMENTS: NORMAL
COMMENTS: NORMAL

## 2023-11-29 ASSESSMENT — REFRACTION_WEARINGRX
OD_SPHERE: +1.25
OS_SPHERE: +1.25

## 2023-11-29 ASSESSMENT — EXTERNAL EXAM - RIGHT EYE: OD_EXAM: NORMAL

## 2023-11-29 ASSESSMENT — PAIN SCALES - GENERAL: PAINLEVEL: 0-NO PAIN

## 2023-11-29 NOTE — ASSESSMENT & PLAN NOTE
Stable intraocular lens (IOL) both eyes (OU), has optic capture around iris right eye (OD) but no significant issues inflammation or signs of irritation. Will have stop timolol as IOP good both eyes (OU) and has already stopped prednisolone taper from retina surgery.

## 2023-11-29 NOTE — PROGRESS NOTES
Assessment/Plan   Problem List Items Addressed This Visit          Eye/Vision problems    Refractive error     Checked refraction today, no significant improvement, will provide with a set of numbers for reading add. See back in 3 months for vision check or sooner PRN.          Pseudophakia of both eyes - Primary     Stable intraocular lens (IOL) both eyes (OU), has optic capture around iris right eye (OD) but no significant issues inflammation or signs of irritation. Will have stop timolol as IOP good both eyes (OU) and has already stopped prednisolone taper from retina surgery.            Provided reassurance regarding above diagnoses and care received in the office visit today. Discussed outcomes and options along with the importance of treatment compliance. Understands the importance of any follow up visits. Patient instructed to call/communicate with our office if any new issues, questions, or concerns.     Will plan to see back in 3 months for vision check or sooner PRN

## 2023-11-29 NOTE — PATIENT INSTRUCTIONS
Thank you so much for choosing me to provide your care today!    If you were dilated your vision may remain blurry   or light sensitive for several hours.    The nature of eye and vision problems can require frequent follow up, please make every effort to adhere to any future appointments.    If you have any issues, questions, or concerns,   please do not hesitate to reach out.    If you receive a survey in regards to your care today, please mention any exceptional care my office staff and/or technicians provided.    You can reach our office at this number:  [phone]

## 2023-11-29 NOTE — ASSESSMENT & PLAN NOTE
Checked refraction today, no significant improvement, will provide with a set of numbers for reading add. See back in 3 months for vision check or sooner PRN.

## 2023-11-30 ENCOUNTER — TELEPHONE (OUTPATIENT)
Dept: OPHTHALMOLOGY | Facility: CLINIC | Age: 73
End: 2023-11-30
Payer: MEDICARE

## 2023-12-08 ENCOUNTER — OFFICE VISIT (OUTPATIENT)
Dept: CARDIOLOGY | Facility: CLINIC | Age: 73
End: 2023-12-08
Payer: MEDICARE

## 2023-12-08 VITALS
SYSTOLIC BLOOD PRESSURE: 124 MMHG | OXYGEN SATURATION: 98 % | WEIGHT: 144 LBS | BODY MASS INDEX: 22.55 KG/M2 | DIASTOLIC BLOOD PRESSURE: 70 MMHG | HEART RATE: 67 BPM

## 2023-12-08 DIAGNOSIS — E78.5 DYSLIPIDEMIA: ICD-10-CM

## 2023-12-08 DIAGNOSIS — I65.23 BILATERAL CAROTID ARTERY STENOSIS: Primary | ICD-10-CM

## 2023-12-08 DIAGNOSIS — H34.219 HOLLENHORST PLAQUE: ICD-10-CM

## 2023-12-08 DIAGNOSIS — I35.0 NONRHEUMATIC AORTIC VALVE STENOSIS: ICD-10-CM

## 2023-12-08 PROCEDURE — 99214 OFFICE O/P EST MOD 30 MIN: CPT | Performed by: INTERNAL MEDICINE

## 2023-12-08 PROCEDURE — 1159F MED LIST DOCD IN RCRD: CPT | Performed by: INTERNAL MEDICINE

## 2023-12-08 PROCEDURE — 1126F AMNT PAIN NOTED NONE PRSNT: CPT | Performed by: INTERNAL MEDICINE

## 2023-12-08 PROCEDURE — 1160F RVW MEDS BY RX/DR IN RCRD: CPT | Performed by: INTERNAL MEDICINE

## 2023-12-08 PROCEDURE — 1036F TOBACCO NON-USER: CPT | Performed by: INTERNAL MEDICINE

## 2023-12-08 RX ORDER — BISMUTH SUBSALICYLATE 262 MG
1 TABLET,CHEWABLE ORAL DAILY
COMMUNITY
End: 2024-05-09 | Stop reason: WASHOUT

## 2023-12-08 ASSESSMENT — ENCOUNTER SYMPTOMS
CLAUDICATION: 0
WEIGHT LOSS: 0
WHEEZING: 0
MYALGIAS: 0
WEAKNESS: 0
SHORTNESS OF BREATH: 0
COUGH: 0
DIZZINESS: 0
DIAPHORESIS: 0
PALPITATIONS: 0
DYSPNEA ON EXERTION: 0
ORTHOPNEA: 0
NEAR-SYNCOPE: 0
SYNCOPE: 0
FEVER: 0
PND: 0
WEIGHT GAIN: 0
IRREGULAR HEARTBEAT: 0

## 2023-12-08 ASSESSMENT — PAIN SCALES - GENERAL: PAINLEVEL: 0-NO PAIN

## 2023-12-08 NOTE — PROGRESS NOTES
"Subjective      Chief Complaint   Patient presents with    Follow-up        73-year-old female with hypertension, hyperlipidemia and evidence atherosclerotic disease (Hollenhorst plaque). She presents for follow-up. She has mild Aortic stenosis. She also has a history of carotid artery stenosis and follows regularly with [facility], 50-60% JA stenosis, mild LICA stenosis.          Review of Systems   Constitutional: Negative for diaphoresis, fever, weight gain and weight loss.   Eyes:  Negative for visual disturbance.   Cardiovascular:  Negative for chest pain, claudication, dyspnea on exertion, irregular heartbeat, leg swelling, near-syncope, orthopnea, palpitations, paroxysmal nocturnal dyspnea and syncope.   Respiratory:  Negative for cough, shortness of breath and wheezing.    Musculoskeletal:  Negative for muscle weakness and myalgias.   Neurological:  Negative for dizziness and weakness.   All other systems reviewed and are negative.       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    Hordeolum externum unspecified eye, unspecified eyelid 05/17/2017    Stye    Hyperlipidemia     Impaired glucose tolerance 09/02/2023    Mitral valve regurgitation 09/02/2023    Osteopenia     Osteopenia 09/02/2023    Personal history of diseases of the skin and subcutaneous tissue     History of lichen planus    Pseudophakia     Status post bilateral cataract extraction     Toxic effect of venom of bees, accidental (unintentional), initial encounter 05/17/2017    Bee sting reaction "    Vitamin D deficiency 09/02/2023        Past Surgical History:   Procedure Laterality Date    CATARACT EXTRACTION      bilateral cataract surgery    CHOLECYSTECTOMY      COLONOSCOPY  2003    COLONOSCOPY  2012    COLONOSCOPY  2014    ESOPHAGOGASTRODUODENOSCOPY  2012    ESOPHAGOGASTRODUODENOSCOPY  2014    MOUTH SURGERY  2018    OOPHORECTOMY Right     OTHER SURGICAL HISTORY  10/24/2022    right ovary removed    OTHER SURGICAL HISTORY  10/24/2022    OTHER SURGICAL HISTORY  10/24/2022    Cardiac catheterization    OTHER SURGICAL HISTORY  10/24/2022    Cholecystectomy    OTHER SURGICAL HISTORY      teeth pulled under anesthesia    PARS PLANA VITRECTOMY Right     2023        Social History     Socioeconomic History    Marital status:      Spouse name: Not on file    Number of children: Not on file    Years of education: Not on file    Highest education level: Not on file   Occupational History    Not on file   Tobacco Use    Smoking status: Never    Smokeless tobacco: Never   Vaping Use    Vaping Use: Never used   Substance and Sexual Activity    Alcohol use: Yes     Comment: has 1-2 drinks once a month.    Drug use: Never    Sexual activity: Not on file   Other Topics Concern    Not on file   Social History Narrative    Not on file     Social Determinants of Health     Financial Resource Strain: Not on file   Food Insecurity: Not on file   Transportation Needs: Not on file   Physical Activity: Not on file   Stress: Not on file   Social Connections: Not on file   Intimate Partner Violence: Not on file   Housing Stability: Not on file        Family History   Problem Relation Name Age of Onset    Diabetes Mother      Heart disease Mother      Heart disease Father      No Known Problems Brother      Cancer Son          OBJECTIVE:    Vitals:    12/08/23 0948   BP: 124/70   Pulse: 67   SpO2: 98%        Vitals reviewed.   Constitutional:       Appearance: Normal and healthy appearance. Not in distress.   Pulmonary:       Effort: Pulmonary effort is normal.      Breath sounds: Normal breath sounds.   Cardiovascular:      Normal rate. Regular rhythm. Normal S1. Normal S2.       Murmurs: There is a grade 2/6 midsystolic murmur at the URSB.      No gallop.  No click.   Pulses:     Intact distal pulses.   Edema:     Peripheral edema absent.   Skin:     General: Skin is warm and dry.   Neurological:      General: No focal deficit present.          Lab Review:   Lab Results   Component Value Date     10/23/2023    K 4.5 10/23/2023     10/23/2023    CO2 28 10/23/2023    BUN 8 10/23/2023    CREATININE 0.80 10/23/2023    GLUCOSE 106 (H) 10/23/2023    CALCIUM 9.4 10/23/2023     Lab Results   Component Value Date    CHOL 157 10/23/2023    TRIG 102 10/23/2023    HDL 58.0 10/23/2023       Lab Results   Component Value Date    LDLCALC 79 10/23/2023        Bilateral carotid artery stenosis  Management per Dr Amaya, yearly US    Dyslipidemia  W Hollenhorsts our goal is <70. Most recent lipid panel shows that she is close. Can hold off on Repatha    Nonrheumatic aortic valve stenosis  Mild progressive stage B. Repeat echo in 6m

## 2023-12-08 NOTE — ASSESSMENT & PLAN NOTE
W Hollenhorsts our goal is <70. Most recent lipid panel shows that she is close. Can hold off on Repatha

## 2024-01-04 ENCOUNTER — TELEPHONE (OUTPATIENT)
Dept: OPHTHALMOLOGY | Facility: CLINIC | Age: 74
End: 2024-01-04
Payer: MEDICARE

## 2024-01-04 NOTE — TELEPHONE ENCOUNTER
Pt got her new glasses and has had them for a few weeks. She says that her eyes have been blurry now and is concerned. Would like to have glasses checked and to see you about this.  Schedule?

## 2024-01-11 ENCOUNTER — OFFICE VISIT (OUTPATIENT)
Dept: OPHTHALMOLOGY | Facility: CLINIC | Age: 74
End: 2024-01-11
Payer: MEDICARE

## 2024-01-11 DIAGNOSIS — H52.7 REFRACTIVE ERROR: ICD-10-CM

## 2024-01-11 DIAGNOSIS — Z96.1 PSEUDOPHAKIA OF BOTH EYES: ICD-10-CM

## 2024-01-11 DIAGNOSIS — H04.129 DRY EYE: Primary | ICD-10-CM

## 2024-01-11 PROCEDURE — 99212 OFFICE O/P EST SF 10 MIN: CPT | Performed by: OPHTHALMOLOGY

## 2024-01-11 RX ORDER — DEXAMETHASONE 0.5 MG/5ML
SOLUTION ORAL
COMMUNITY
Start: 2023-12-29

## 2024-01-11 RX ORDER — NYSTATIN AND TRIAMCINOLONE ACETONIDE 100000; 1 [USP'U]/G; MG/G
OINTMENT TOPICAL
COMMUNITY
Start: 2024-01-08

## 2024-01-11 ASSESSMENT — KERATOMETRY
OS_AXISANGLE2_DEGREES: 100
METHOD_AUTO_MANUAL: AUTOMATED
OD_AXISANGLE2_DEGREES: 90
OS_AXISANGLE_DEGREES: 10
OD_AXISANGLE_DEGREES: 180
OS_K1POWER_DIOPTERS: 42.50
OS_K2POWER_DIOPTERS: 43.25
OD_K2POWER_DIOPTERS: 44.25
OD_K1POWER_DIOPTERS: 43.25

## 2024-01-11 ASSESSMENT — PAIN SCALES - GENERAL: PAINLEVEL: 0-NO PAIN

## 2024-01-11 ASSESSMENT — REFRACTION_WEARINGRX
OS_AXIS: 080
OD_AXIS: 103
OD_ADD: +2.50
OS_ADD: +2.50
OD_SPHERE: -0.00
OS_CYLINDER: -1.25
OD_CYLINDER: -1.50
OS_SPHERE: -0.00

## 2024-01-11 ASSESSMENT — VISUAL ACUITY
METHOD: SNELLEN - SINGLE
OD_CC+: +1
OS_CC+: +1
OS_CC: 20/30
OD_CC: 20/150

## 2024-01-11 ASSESSMENT — ENCOUNTER SYMPTOMS
OCCASIONAL FEELINGS OF UNSTEADINESS: 0
MUSCULOSKELETAL NEGATIVE: 0
CONSTITUTIONAL NEGATIVE: 0
GASTROINTESTINAL NEGATIVE: 0
NEUROLOGICAL NEGATIVE: 0
LOSS OF SENSATION IN FEET: 0
PSYCHIATRIC NEGATIVE: 0
ALLERGIC/IMMUNOLOGIC NEGATIVE: 0
HEMATOLOGIC/LYMPHATIC NEGATIVE: 0
RESPIRATORY NEGATIVE: 0
ENDOCRINE NEGATIVE: 0
DEPRESSION: 0
EYES NEGATIVE: 0
CARDIOVASCULAR NEGATIVE: 0

## 2024-01-11 ASSESSMENT — SLIT LAMP EXAM - LIDS
COMMENTS: NORMAL
COMMENTS: NORMAL

## 2024-01-11 ASSESSMENT — PATIENT HEALTH QUESTIONNAIRE - PHQ9
SUM OF ALL RESPONSES TO PHQ9 QUESTIONS 1 AND 2: 0
2. FEELING DOWN, DEPRESSED OR HOPELESS: NOT AT ALL
1. LITTLE INTEREST OR PLEASURE IN DOING THINGS: NOT AT ALL

## 2024-01-11 ASSESSMENT — REFRACTION_MANIFEST
OD_CYLINDER: -1.50
METHOD_AUTOREFRACTION: 1
OS_CYLINDER: -1.25
OD_AXIS: 095
OD_SPHERE: +0.25
OS_SPHERE: +0.25
OS_AXIS: 085

## 2024-01-11 ASSESSMENT — EXTERNAL EXAM - LEFT EYE: OS_EXAM: NORMAL

## 2024-01-11 ASSESSMENT — EXTERNAL EXAM - RIGHT EYE: OD_EXAM: NORMAL

## 2024-01-11 NOTE — PROGRESS NOTES
Assessment/Plan   Problem List Items Addressed This Visit          Eye/Vision problems    Dry eye - Primary     Symptoms seem most consistent with surface. Advised will have start some PF tears 3-4 times daily, may use more as needed. Do not think visual issues related to glasses RX.          Refractive error     Overall good result with glasses RX. Advised likely will remain limited in some capacity right eye (OD) with retinal history.          Pseudophakia of both eyes     Still with optic capture at iris margin right eye (OD) after retinal procedure. Pupil moving well and no signs of inflammation. Will continue to monitor with serial exam.             Provided reassurance regarding above diagnoses and care received in the office visit today. Discussed outcomes and options along with the importance of treatment compliance. Understands the importance of any follow up visits. Patient instructed to call/communicate with our office if any new issues, questions, or concerns.     Will plan to see back in few weeks for short check or sooner PRN

## 2024-01-11 NOTE — ASSESSMENT & PLAN NOTE
Still with optic capture at iris margin right eye (OD) after retinal procedure. Pupil moving well and no signs of inflammation. Will continue to monitor with serial exam.

## 2024-01-11 NOTE — ASSESSMENT & PLAN NOTE
Overall good result with glasses RX. Advised likely will remain limited in some capacity right eye (OD) with retinal history.

## 2024-01-11 NOTE — ASSESSMENT & PLAN NOTE
Symptoms seem most consistent with surface. Advised will have start some PF tears 3-4 times daily, may use more as needed. Do not think visual issues related to glasses RX.

## 2024-01-18 DIAGNOSIS — E78.5 DYSLIPIDEMIA: ICD-10-CM

## 2024-01-19 RX ORDER — ATORVASTATIN CALCIUM 80 MG/1
80 TABLET, FILM COATED ORAL DAILY
Qty: 90 TABLET | Refills: 2 | Status: SHIPPED | OUTPATIENT
Start: 2024-01-19

## 2024-01-25 ENCOUNTER — OFFICE VISIT (OUTPATIENT)
Dept: OPHTHALMOLOGY | Facility: CLINIC | Age: 74
End: 2024-01-25
Payer: MEDICARE

## 2024-01-25 DIAGNOSIS — H52.7 REFRACTIVE ERROR: ICD-10-CM

## 2024-01-25 DIAGNOSIS — H04.129 DRY EYE: Primary | ICD-10-CM

## 2024-01-25 DIAGNOSIS — Z96.1 PSEUDOPHAKIA OF BOTH EYES: ICD-10-CM

## 2024-01-25 PROCEDURE — 99212 OFFICE O/P EST SF 10 MIN: CPT | Performed by: OPHTHALMOLOGY

## 2024-01-25 ASSESSMENT — VISUAL ACUITY
METHOD: SNELLEN - LINEAR
OS_CC+: -1
CORRECTION_TYPE: GLASSES
OS_CC: 20/25
OD_CC: 20/400

## 2024-01-25 ASSESSMENT — ENCOUNTER SYMPTOMS
GASTROINTESTINAL NEGATIVE: 0
ENDOCRINE NEGATIVE: 0
PSYCHIATRIC NEGATIVE: 0
CARDIOVASCULAR NEGATIVE: 0
EYES NEGATIVE: 0
NEUROLOGICAL NEGATIVE: 0
MUSCULOSKELETAL NEGATIVE: 0
ALLERGIC/IMMUNOLOGIC NEGATIVE: 0
RESPIRATORY NEGATIVE: 0
LOSS OF SENSATION IN FEET: 0
CONSTITUTIONAL NEGATIVE: 0
DEPRESSION: 0
HEMATOLOGIC/LYMPHATIC NEGATIVE: 0
OCCASIONAL FEELINGS OF UNSTEADINESS: 0

## 2024-01-25 ASSESSMENT — REFRACTION_WEARINGRX
OS_AXIS: 080
SPECS_TYPE: PAL
OD_AXIS: 103
OS_SPHERE: -0.00
OD_CYLINDER: -1.50
OD_ADD: +2.50
OD_SPHERE: -0.00
OS_ADD: +2.50
OS_CYLINDER: -1.25

## 2024-01-25 ASSESSMENT — PATIENT HEALTH QUESTIONNAIRE - PHQ9
2. FEELING DOWN, DEPRESSED OR HOPELESS: NOT AT ALL
SUM OF ALL RESPONSES TO PHQ9 QUESTIONS 1 AND 2: 0
1. LITTLE INTEREST OR PLEASURE IN DOING THINGS: NOT AT ALL

## 2024-01-25 ASSESSMENT — EXTERNAL EXAM - RIGHT EYE: OD_EXAM: NORMAL

## 2024-01-25 ASSESSMENT — SLIT LAMP EXAM - LIDS
COMMENTS: NORMAL
COMMENTS: NORMAL

## 2024-01-25 ASSESSMENT — PAIN SCALES - GENERAL: PAINLEVEL: 0-NO PAIN

## 2024-01-25 ASSESSMENT — EXTERNAL EXAM - LEFT EYE: OS_EXAM: NORMAL

## 2024-01-25 NOTE — ASSESSMENT & PLAN NOTE
Intraocular lens (IOL) remains with optic capture superior and inferior post vitrectomy for macular hole repair. PC integrity might be compromised to would recommend to keep in current position for now.

## 2024-01-25 NOTE — PATIENT INSTRUCTIONS
Thank you so much for choosing me to provide your care today!    If you were dilated your vision may remain blurry   or light sensitive for several hours.    The nature of eye and vision problems can require frequent follow up, please make every effort to adhere to any future appointments.    If you have any issues, questions, or concerns,   please do not hesitate to reach out.    If you receive a survey in regards to your care today, please mention any exceptional care my office staff and/or technicians provided.    You can reach our office at this number:  [phone]     Please have your optical check the optical center of your glasses. No variation in prescription noted.

## 2024-01-25 NOTE — ASSESSMENT & PLAN NOTE
Suspect RX is correct but will have patient take glasses to optical to check optical centers. Otherwise, likely at [facility] we can obtain right eye (OD).

## 2024-01-25 NOTE — ASSESSMENT & PLAN NOTE
Some symptomatic improvement but still some residual FBS, ? Faint papillary reaction. Could trial an allergy drop a few times daily. Continue good lubrication

## 2024-01-25 NOTE — PROGRESS NOTES
Assessment/Plan   Problem List Items Addressed This Visit          Eye/Vision problems    Dry eye - Primary     Some symptomatic improvement but still some residual FBS, ? Faint papillary reaction. Could trial an allergy drop a few times daily. Continue good lubrication         Refractive error     Suspect RX is correct but will have patient take glasses to optical to check optical centers. Otherwise, likely at [facility] we can obtain right eye (OD).          Pseudophakia of both eyes     Intraocular lens (IOL) remains with optic capture superior and inferior post vitrectomy for macular hole repair. PC integrity might be compromised to would recommend to keep in current position for now.             Provided reassurance regarding above diagnoses and care received in the office visit today. Discussed outcomes and options along with the importance of treatment compliance. Understands the importance of any follow up visits. Patient instructed to call/communicate with our office if any new issues, questions, or concerns.     Will plan to see back in 3 months for short check or sooner PRN

## 2024-01-29 ENCOUNTER — TELEPHONE (OUTPATIENT)
Dept: OPHTHALMOLOGY | Facility: CLINIC | Age: 74
End: 2024-01-29
Payer: MEDICARE

## 2024-01-29 NOTE — TELEPHONE ENCOUNTER
Patient called to let you know that she went to [facility] and her #'s are the same. Also, she bought drops for her eyes and it has Benzalkonium Chloride in it and she is allergic to Benzoate.  She is not sure it that matters but it makes her nervous so she wanted to double check to make sure it was ok to use these drops.    Thank you,  Toshia

## 2024-01-30 ENCOUNTER — TELEPHONE (OUTPATIENT)
Dept: PRIMARY CARE | Facility: CLINIC | Age: 74
End: 2024-01-30
Payer: COMMERCIAL

## 2024-01-30 NOTE — TELEPHONE ENCOUNTER
"Patient notified of instructions from provider. Patient states all symptoms have stopped except her stools \" are a little loose\" but has been able to eat today. Patient wanted to know  when she could go out to store to  some supplies. Told patient 4 to 5 days from symptoms start as long as she is feeling better and symptoms are improving and if symptoms get worse or change to call the office. Patient states ok  "

## 2024-01-30 NOTE — TELEPHONE ENCOUNTER
Pt c/o nausea, vomiting, diarrhea, fever starting on 1/28. States that she is no longer having those sx, but is still feeling shaky, weak and dizzy. States that she is trying to eating a little bit at a time. Last meal yesterday. Drinking water and 7 up. Taking Pepto OTC. Would like to know what else dr recommends? Please advise.

## 2024-02-12 ENCOUNTER — HOSPITAL ENCOUNTER (OUTPATIENT)
Dept: RADIOLOGY | Facility: CLINIC | Age: 74
Discharge: HOME | End: 2024-02-12
Payer: MEDICARE

## 2024-02-12 VITALS — BODY MASS INDEX: 22.13 KG/M2 | WEIGHT: 141 LBS | HEIGHT: 67 IN

## 2024-02-12 DIAGNOSIS — Z12.31 OTHER SCREENING MAMMOGRAM: ICD-10-CM

## 2024-02-12 PROCEDURE — 77067 SCR MAMMO BI INCL CAD: CPT

## 2024-02-13 ENCOUNTER — OFFICE VISIT (OUTPATIENT)
Dept: OPHTHALMOLOGY | Facility: CLINIC | Age: 74
End: 2024-02-13
Payer: MEDICARE

## 2024-02-13 DIAGNOSIS — H35.341 MACULAR HOLE, RIGHT EYE: ICD-10-CM

## 2024-02-13 DIAGNOSIS — H43.823 VITREOMACULAR TRACTION SYNDROME OF BOTH EYES: Primary | ICD-10-CM

## 2024-02-13 PROCEDURE — 99213 OFFICE O/P EST LOW 20 MIN: CPT | Performed by: OPHTHALMOLOGY

## 2024-02-13 PROCEDURE — 92134 CPTRZ OPH DX IMG PST SGM RTA: CPT | Mod: BILATERAL PROCEDURE | Performed by: OPHTHALMOLOGY

## 2024-02-13 ASSESSMENT — ENCOUNTER SYMPTOMS: EYES NEGATIVE: 1

## 2024-02-13 ASSESSMENT — EXTERNAL EXAM - RIGHT EYE: OD_EXAM: NORMAL

## 2024-02-13 ASSESSMENT — SLIT LAMP EXAM - LIDS
COMMENTS: NORMAL
COMMENTS: NORMAL

## 2024-02-13 ASSESSMENT — CONF VISUAL FIELD
OS_INFERIOR_NASAL_RESTRICTION: 0
OD_SUPERIOR_NASAL_RESTRICTION: 0
OD_INFERIOR_NASAL_RESTRICTION: 0
OS_NORMAL: 1
OD_INFERIOR_TEMPORAL_RESTRICTION: 0
OS_SUPERIOR_TEMPORAL_RESTRICTION: 0
OS_INFERIOR_TEMPORAL_RESTRICTION: 0
OD_NORMAL: 1
OS_SUPERIOR_NASAL_RESTRICTION: 0
OD_SUPERIOR_TEMPORAL_RESTRICTION: 0

## 2024-02-13 ASSESSMENT — CUP TO DISC RATIO
OS_RATIO: 0.4
OD_RATIO: 0.4

## 2024-02-13 ASSESSMENT — VISUAL ACUITY
OD_CC: 20/80+2
OS_CC: 20/25
METHOD: SNELLEN - LINEAR

## 2024-02-13 ASSESSMENT — EXTERNAL EXAM - LEFT EYE: OS_EXAM: NORMAL

## 2024-02-13 ASSESSMENT — TONOMETRY
IOP_METHOD: GOLDMANN APPLANATION
OS_IOP_MMHG: 14
OD_IOP_MMHG: 14

## 2024-02-13 NOTE — PROGRESS NOTES
Assessment/Plan   Diagnoses and all orders for this visit:  Vitreomacular traction syndrome of both eyes  -     OCT, Retina - OU - Both Eyes  Macular hole, right eye  800 micron FTMH now sealed    OCT : 02/13/24     OD: Normal Foveal Contour & Retinal laminations, EZ line preserved, (-) IRF/subretinal fluid (SRF), CST-WNL  OS: Normal Foveal Contour & Retinal laminations, EZ line preserved, (-) IRF/subretinal fluid (SRF), CST-WNL      POM#4 status post (s/p) pars plana vitrectomy (PPV) & membrane peel 10/12/23   Mac hole sealed, excellent anatomic outcome  The lesn intraocular lens (IOL) appears to have some pupillary capture  Will check undilated      FU 3m

## 2024-03-06 ENCOUNTER — OFFICE VISIT (OUTPATIENT)
Dept: OPHTHALMOLOGY | Facility: CLINIC | Age: 74
End: 2024-03-06
Payer: MEDICARE

## 2024-03-06 DIAGNOSIS — Z96.1 PSEUDOPHAKIA OF BOTH EYES: Primary | ICD-10-CM

## 2024-03-06 DIAGNOSIS — H04.129 DRY EYE: ICD-10-CM

## 2024-03-06 PROCEDURE — 99212 OFFICE O/P EST SF 10 MIN: CPT | Performed by: OPHTHALMOLOGY

## 2024-03-06 ASSESSMENT — ENCOUNTER SYMPTOMS
ENDOCRINE NEGATIVE: 0
OCCASIONAL FEELINGS OF UNSTEADINESS: 0
CONSTITUTIONAL NEGATIVE: 0
PSYCHIATRIC NEGATIVE: 0
CARDIOVASCULAR NEGATIVE: 0
MUSCULOSKELETAL NEGATIVE: 0
NEUROLOGICAL NEGATIVE: 0
DEPRESSION: 0
LOSS OF SENSATION IN FEET: 0
EYES NEGATIVE: 0
RESPIRATORY NEGATIVE: 0
ALLERGIC/IMMUNOLOGIC NEGATIVE: 0
HEMATOLOGIC/LYMPHATIC NEGATIVE: 0
GASTROINTESTINAL NEGATIVE: 0

## 2024-03-06 ASSESSMENT — REFRACTION_WEARINGRX
SPECS_TYPE: PAL
OD_AXIS: 103
OD_SPHERE: -0.00
OD_CYLINDER: -1.50
OS_AXIS: 080
OS_CYLINDER: -1.25
OS_SPHERE: -0.00
OS_ADD: +2.50
OD_ADD: +2.50

## 2024-03-06 ASSESSMENT — EXTERNAL EXAM - RIGHT EYE: OD_EXAM: NORMAL

## 2024-03-06 ASSESSMENT — EXTERNAL EXAM - LEFT EYE: OS_EXAM: NORMAL

## 2024-03-06 ASSESSMENT — SLIT LAMP EXAM - LIDS
COMMENTS: NORMAL
COMMENTS: NORMAL

## 2024-03-06 ASSESSMENT — VISUAL ACUITY
CORRECTION_TYPE: GLASSES
OS_CC+: -1
OD_CC: 20/80
METHOD: SNELLEN - LINEAR
OS_CC: 20/25
OD_CC+: -1

## 2024-03-06 ASSESSMENT — PAIN SCALES - GENERAL: PAINLEVEL: 0-NO PAIN

## 2024-03-06 NOTE — ASSESSMENT & PLAN NOTE
Stable intraocular lens (IOL) both eyes (OU). Superior optic capture with intraocular lens (IOL) right eye (OD) appears to have released since last visit here. Still captured at inferior iris. Unsure if posterior capsule is intact. Advised vision has improved significantly compared to prior visit and will continue to monitor with serial exam.

## 2024-03-06 NOTE — PROGRESS NOTES
Assessment/Plan   Problem List Items Addressed This Visit       Dry eye     Advised to continue best lubrication.          Pseudophakia of both eyes - Primary     Stable intraocular lens (IOL) both eyes (OU). Superior optic capture with intraocular lens (IOL) right eye (OD) appears to have released since last visit here. Still captured at inferior iris. Unsure if posterior capsule is intact. Advised vision has improved significantly compared to prior visit and will continue to monitor with serial exam.             Provided reassurance regarding above diagnoses and care received in the office visit today. Discussed outcomes and options along with the importance of treatment compliance. Understands the importance of any follow up visits. Patient instructed to call/communicate with our office if any new issues, questions, or concerns.     Will plan to see back in 4 months short check or sooner PRN

## 2024-03-06 NOTE — PATIENT INSTRUCTIONS
Thank you so much for choosing me to provide your care today!    If you were dilated your vision may remain blurry   or light sensitive for several hours.    The nature of eye and vision problems can require frequent follow up, please make every effort to adhere to any future appointments.    If you have any issues, questions, or concerns,   please do not hesitate to reach out.    If you receive a survey in regards to your care today, please mention any exceptional care my office staff and/or technicians provided.    You can reach our office at this number:  [phone]

## 2024-03-06 NOTE — Clinical Note
Appears like superior optic capture of intraocular lens (IOL) has released since my last exam, still with inferior capture. Her vision has improved significantly but may have more improvement with time. I will get her a copy of the OCT we have in office since that information is not networked yet. Please let me know how I can assist moving forward.

## 2024-04-05 ENCOUNTER — OFFICE VISIT (OUTPATIENT)
Dept: OPHTHALMOLOGY | Facility: CLINIC | Age: 74
End: 2024-04-05
Payer: MEDICARE

## 2024-04-05 DIAGNOSIS — H35.341 MACULAR HOLE, RIGHT EYE: Primary | ICD-10-CM

## 2024-04-05 PROCEDURE — 99213 OFFICE O/P EST LOW 20 MIN: CPT | Performed by: OPHTHALMOLOGY

## 2024-04-05 PROCEDURE — 92134 CPTRZ OPH DX IMG PST SGM RTA: CPT | Mod: BILATERAL PROCEDURE | Performed by: OPHTHALMOLOGY

## 2024-04-05 ASSESSMENT — ENCOUNTER SYMPTOMS
ENDOCRINE NEGATIVE: 0
CARDIOVASCULAR NEGATIVE: 0
NEUROLOGICAL NEGATIVE: 0
RESPIRATORY NEGATIVE: 0
PSYCHIATRIC NEGATIVE: 0
ALLERGIC/IMMUNOLOGIC NEGATIVE: 0
EYES NEGATIVE: 0
CONSTITUTIONAL NEGATIVE: 0
MUSCULOSKELETAL NEGATIVE: 0
GASTROINTESTINAL NEGATIVE: 0
HEMATOLOGIC/LYMPHATIC NEGATIVE: 0

## 2024-04-05 ASSESSMENT — VISUAL ACUITY
OS_CC: 20/25
METHOD: SNELLEN - LINEAR
CORRECTION_TYPE: GLASSES
OD_CC: 20/50

## 2024-04-05 ASSESSMENT — SLIT LAMP EXAM - LIDS
COMMENTS: NORMAL
COMMENTS: NORMAL

## 2024-04-05 ASSESSMENT — EXTERNAL EXAM - RIGHT EYE: OD_EXAM: NORMAL

## 2024-04-05 ASSESSMENT — EXTERNAL EXAM - LEFT EYE: OS_EXAM: NORMAL

## 2024-04-05 ASSESSMENT — TONOMETRY
OS_IOP_MMHG: DEF
OD_IOP_MMHG: DEF
IOP_METHOD: GOLDMANN APPLANATION

## 2024-04-05 NOTE — PROGRESS NOTES
Assessment/Plan   Diagnoses and all orders for this visit:  Macular hole, right eye  -     OCT, Retina - OU - Both Eyes  800 micron FTMH now sealed    OCT : 04/05/24     OD: Normal Foveal Contour & Retinal laminations, EZ line preserved, (-) IRF/subretinal fluid (SRF), CST-WNL  OS: Normal Foveal Contour & Retinal laminations, EZ line preserved, (-) IRF/subretinal fluid (SRF), CST-WNL      POM#4 status post (s/p) pars plana vitrectomy (PPV) & membrane peel 10/12/23   Mac hole sealed, excellent anatomic outcome  The lesn intraocular lens (IOL) appears to have some pupillary capture  Will check undilated          [place] Dr jane opinion  Should we reposition lens? Right  Refraction right?  Large MH sealed 20 400 to 20 50

## 2024-05-03 ENCOUNTER — APPOINTMENT (OUTPATIENT)
Dept: PRIMARY CARE | Facility: CLINIC | Age: 74
End: 2024-05-03
Payer: COMMERCIAL

## 2024-05-06 ENCOUNTER — LAB (OUTPATIENT)
Dept: LAB | Facility: LAB | Age: 74
End: 2024-05-06
Payer: MEDICARE

## 2024-05-06 DIAGNOSIS — E78.2 MIXED HYPERLIPIDEMIA: ICD-10-CM

## 2024-05-06 DIAGNOSIS — Z01.89 ENCOUNTER FOR ROUTINE LABORATORY TESTING: ICD-10-CM

## 2024-05-06 DIAGNOSIS — R73.9 HYPERGLYCEMIA: ICD-10-CM

## 2024-05-06 DIAGNOSIS — K21.9 GASTROESOPHAGEAL REFLUX DISEASE WITHOUT ESOPHAGITIS: ICD-10-CM

## 2024-05-06 DIAGNOSIS — E55.9 VITAMIN D DEFICIENCY: ICD-10-CM

## 2024-05-06 PROBLEM — L90.0 LICHEN SCLEROSUS ET ATROPHICUS: Status: RESOLVED | Noted: 2023-09-02 | Resolved: 2024-05-06

## 2024-05-06 PROBLEM — N64.4 PAIN OF RIGHT BREAST: Status: RESOLVED | Noted: 2023-09-02 | Resolved: 2024-05-06

## 2024-05-06 PROBLEM — L23.9 ALLERGIC CONTACT DERMATITIS: Status: RESOLVED | Noted: 2024-05-06 | Resolved: 2024-05-06

## 2024-05-06 PROBLEM — I10 ESSENTIAL HYPERTENSION: Status: RESOLVED | Noted: 2024-05-06 | Resolved: 2024-05-06

## 2024-05-06 PROBLEM — H57.10 PAIN AROUND EYE: Status: RESOLVED | Noted: 2021-02-09 | Resolved: 2024-05-06

## 2024-05-06 PROBLEM — R13.10 DYSPHAGIA: Status: RESOLVED | Noted: 2024-05-06 | Resolved: 2024-05-06

## 2024-05-06 PROBLEM — M79.601 PAIN OF RIGHT UPPER EXTREMITY: Status: RESOLVED | Noted: 2024-05-06 | Resolved: 2024-05-06

## 2024-05-06 PROBLEM — Q82.8 CUTIS LAXA: Status: RESOLVED | Noted: 2024-05-06 | Resolved: 2024-05-06

## 2024-05-06 PROBLEM — R93.89 ABNORMAL X-RAY EXAMINATION: Status: RESOLVED | Noted: 2023-03-27 | Resolved: 2024-05-06

## 2024-05-06 PROBLEM — I77.9 DISORDER OF CAROTID ARTERY (CMS-HCC): Status: RESOLVED | Noted: 2023-09-02 | Resolved: 2024-05-06

## 2024-05-06 PROBLEM — Z96.1 PRESENCE OF INTRAOCULAR LENS: Status: RESOLVED | Noted: 2024-05-06 | Resolved: 2024-05-06

## 2024-05-06 PROBLEM — L43.8 ORAL LICHEN PLANUS: Status: RESOLVED | Noted: 2024-05-06 | Resolved: 2024-05-06

## 2024-05-06 PROBLEM — H52.7 DISORDER OF REFRACTION: Status: RESOLVED | Noted: 2023-09-02 | Resolved: 2024-05-06

## 2024-05-06 PROBLEM — H04.123 DRY EYES: Status: RESOLVED | Noted: 2023-09-02 | Resolved: 2024-05-06
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
## 2024-05-06 PROBLEM — Z98.42 HISTORY OF LEFT CATARACT EXTRACTION: Status: RESOLVED | Noted: 2023-10-04 | Resolved: 2024-05-06

## 2024-05-06 PROBLEM — T14.8XXA CONTUSION: Status: RESOLVED | Noted: 2024-05-06 | Resolved: 2024-05-06

## 2024-05-06 PROBLEM — H26.9 IMMATURE CATARACT: Status: RESOLVED | Noted: 2021-02-09 | Resolved: 2024-05-06

## 2024-05-06 PROBLEM — K13.0 CHEILITIS: Status: RESOLVED | Noted: 2024-05-06 | Resolved: 2024-05-06

## 2024-05-06 PROBLEM — R59.0 CERVICAL LYMPHADENOPATHY: Status: RESOLVED | Noted: 2024-05-06 | Resolved: 2024-05-06

## 2024-05-06 PROBLEM — H25.12 AGE-RELATED NUCLEAR CATARACT OF LEFT EYE: Status: RESOLVED | Noted: 2024-05-06 | Resolved: 2024-05-06

## 2024-05-06 PROBLEM — H02.839 DERMATOCHALASIS: Status: RESOLVED | Noted: 2023-09-02 | Resolved: 2024-05-06

## 2024-05-06 PROBLEM — I35.0 NONRHEUMATIC AORTIC VALVE STENOSIS: Status: RESOLVED | Noted: 2024-05-06 | Resolved: 2024-05-06

## 2024-05-06 PROBLEM — N95.2 VAGINAL ATROPHY: Status: RESOLVED | Noted: 2023-09-02 | Resolved: 2024-05-06

## 2024-05-06 LAB
25(OH)D3 SERPL-MCNC: 32 NG/ML (ref 31–100)
ALBUMIN SERPL-MCNC: 4.1 G/DL (ref 3.5–5)
ALP BLD-CCNC: 73 U/L (ref 35–125)
ALT SERPL-CCNC: 9 U/L (ref 5–40)
ANION GAP SERPL CALC-SCNC: 13 MMOL/L
AST SERPL-CCNC: 17 U/L (ref 5–40)
BASOPHILS # BLD AUTO: 0.07 X10*3/UL (ref 0–0.1)
BASOPHILS NFR BLD AUTO: 1.1 %
BILIRUB SERPL-MCNC: 0.6 MG/DL (ref 0.1–1.2)
BUN SERPL-MCNC: 8 MG/DL (ref 8–25)
CALCIUM SERPL-MCNC: 9.2 MG/DL (ref 8.5–10.4)
CHLORIDE SERPL-SCNC: 104 MMOL/L (ref 97–107)
CHOLEST SERPL-MCNC: 166 MG/DL (ref 133–200)
CHOLEST/HDLC SERPL: 3.5 {RATIO}
CO2 SERPL-SCNC: 27 MMOL/L (ref 24–31)
CREAT SERPL-MCNC: 0.8 MG/DL (ref 0.4–1.6)
EGFRCR SERPLBLD CKD-EPI 2021: 77 ML/MIN/1.73M*2
EOSINOPHIL # BLD AUTO: 0.21 X10*3/UL (ref 0–0.4)
EOSINOPHIL NFR BLD AUTO: 3.4 %
ERYTHROCYTE [DISTWIDTH] IN BLOOD BY AUTOMATED COUNT: 15.2 % (ref 11.5–14.5)
EST. AVERAGE GLUCOSE BLD GHB EST-MCNC: 126 MG/DL
GLUCOSE SERPL-MCNC: 103 MG/DL (ref 65–99)
HBA1C MFR BLD: 6 %
HCT VFR BLD AUTO: 39.4 % (ref 36–46)
HDLC SERPL-MCNC: 48 MG/DL
HGB BLD-MCNC: 12 G/DL (ref 12–16)
IMM GRANULOCYTES # BLD AUTO: 0.02 X10*3/UL (ref 0–0.5)
IMM GRANULOCYTES NFR BLD AUTO: 0.3 % (ref 0–0.9)
LDLC SERPL CALC-MCNC: 96 MG/DL (ref 65–130)
LYMPHOCYTES # BLD AUTO: 1.68 X10*3/UL (ref 0.8–3)
LYMPHOCYTES NFR BLD AUTO: 27.2 %
MCH RBC QN AUTO: 27.5 PG (ref 26–34)
MCHC RBC AUTO-ENTMCNC: 30.5 G/DL (ref 32–36)
MCV RBC AUTO: 90 FL (ref 80–100)
MONOCYTES # BLD AUTO: 0.73 X10*3/UL (ref 0.05–0.8)
MONOCYTES NFR BLD AUTO: 11.8 %
NEUTROPHILS # BLD AUTO: 3.46 X10*3/UL (ref 1.6–5.5)
NEUTROPHILS NFR BLD AUTO: 56.2 %
NRBC BLD-RTO: 0 /100 WBCS (ref 0–0)
PLATELET # BLD AUTO: 246 X10*3/UL (ref 150–450)
POTASSIUM SERPL-SCNC: 4.3 MMOL/L (ref 3.4–5.1)
PROT SERPL-MCNC: 6.7 G/DL (ref 5.9–7.9)
RBC # BLD AUTO: 4.37 X10*6/UL (ref 4–5.2)
SODIUM SERPL-SCNC: 144 MMOL/L (ref 133–145)
TRIGL SERPL-MCNC: 111 MG/DL (ref 40–150)
TSH SERPL DL<=0.05 MIU/L-ACNC: 3.46 MIU/L (ref 0.27–4.2)
WBC # BLD AUTO: 6.2 X10*3/UL (ref 4.4–11.3)

## 2024-05-06 PROCEDURE — 80061 LIPID PANEL: CPT

## 2024-05-06 PROCEDURE — 84443 ASSAY THYROID STIM HORMONE: CPT

## 2024-05-06 PROCEDURE — 80053 COMPREHEN METABOLIC PANEL: CPT

## 2024-05-06 PROCEDURE — 85025 COMPLETE CBC W/AUTO DIFF WBC: CPT

## 2024-05-06 PROCEDURE — 83036 HEMOGLOBIN GLYCOSYLATED A1C: CPT

## 2024-05-06 PROCEDURE — 36415 COLL VENOUS BLD VENIPUNCTURE: CPT

## 2024-05-06 PROCEDURE — 82306 VITAMIN D 25 HYDROXY: CPT

## 2024-05-09 ENCOUNTER — OFFICE VISIT (OUTPATIENT)
Dept: PRIMARY CARE | Facility: CLINIC | Age: 74
End: 2024-05-09
Payer: MEDICARE

## 2024-05-09 VITALS
SYSTOLIC BLOOD PRESSURE: 128 MMHG | BODY MASS INDEX: 21.66 KG/M2 | HEIGHT: 67 IN | DIASTOLIC BLOOD PRESSURE: 74 MMHG | OXYGEN SATURATION: 98 % | TEMPERATURE: 97 F | WEIGHT: 138 LBS | HEART RATE: 67 BPM

## 2024-05-09 DIAGNOSIS — M85.80 OSTEOPENIA, UNSPECIFIED LOCATION: ICD-10-CM

## 2024-05-09 DIAGNOSIS — I87.2 VENOUS INSUFFICIENCY: ICD-10-CM

## 2024-05-09 DIAGNOSIS — Z01.89 ENCOUNTER FOR ROUTINE LABORATORY TESTING: ICD-10-CM

## 2024-05-09 DIAGNOSIS — R42 DIZZINESS: ICD-10-CM

## 2024-05-09 DIAGNOSIS — R07.9 CHEST PAIN, UNSPECIFIED TYPE: ICD-10-CM

## 2024-05-09 DIAGNOSIS — Z12.31 OTHER SCREENING MAMMOGRAM: ICD-10-CM

## 2024-05-09 DIAGNOSIS — E55.9 VITAMIN D DEFICIENCY: ICD-10-CM

## 2024-05-09 DIAGNOSIS — R73.03 PREDIABETES: ICD-10-CM

## 2024-05-09 DIAGNOSIS — M81.0 AGE-RELATED OSTEOPOROSIS WITHOUT CURRENT PATHOLOGICAL FRACTURE: ICD-10-CM

## 2024-05-09 DIAGNOSIS — E78.2 MIXED HYPERLIPIDEMIA: ICD-10-CM

## 2024-05-09 DIAGNOSIS — Z00.00 ANNUAL PHYSICAL EXAM: Primary | ICD-10-CM

## 2024-05-09 DIAGNOSIS — Z12.11 ENCOUNTER FOR SCREENING FOR MALIGNANT NEOPLASM OF COLON: ICD-10-CM

## 2024-05-09 DIAGNOSIS — K21.9 GASTROESOPHAGEAL REFLUX DISEASE WITHOUT ESOPHAGITIS: ICD-10-CM

## 2024-05-09 PROCEDURE — 99213 OFFICE O/P EST LOW 20 MIN: CPT | Performed by: INTERNAL MEDICINE

## 2024-05-09 PROCEDURE — 1160F RVW MEDS BY RX/DR IN RCRD: CPT | Performed by: INTERNAL MEDICINE

## 2024-05-09 PROCEDURE — 90677 PCV20 VACCINE IM: CPT | Performed by: INTERNAL MEDICINE

## 2024-05-09 PROCEDURE — 1124F ACP DISCUSS-NO DSCNMKR DOCD: CPT | Performed by: INTERNAL MEDICINE

## 2024-05-09 PROCEDURE — 1125F AMNT PAIN NOTED PAIN PRSNT: CPT | Performed by: INTERNAL MEDICINE

## 2024-05-09 PROCEDURE — 1159F MED LIST DOCD IN RCRD: CPT | Performed by: INTERNAL MEDICINE

## 2024-05-09 PROCEDURE — 99215 OFFICE O/P EST HI 40 MIN: CPT | Performed by: INTERNAL MEDICINE

## 2024-05-09 PROCEDURE — G0439 PPPS, SUBSEQ VISIT: HCPCS | Performed by: INTERNAL MEDICINE

## 2024-05-09 ASSESSMENT — ENCOUNTER SYMPTOMS
EYES NEGATIVE: 1
RESPIRATORY NEGATIVE: 1
OCCASIONAL FEELINGS OF UNSTEADINESS: 0
WEAKNESS: 0
ENDOCRINE NEGATIVE: 1
LOSS OF SENSATION IN FEET: 0
CONSTITUTIONAL NEGATIVE: 1
MUSCULOSKELETAL NEGATIVE: 1
GASTROINTESTINAL NEGATIVE: 1
CARDIOVASCULAR NEGATIVE: 1
PSYCHIATRIC NEGATIVE: 1
DIZZINESS: 1
DEPRESSION: 0
HEMATOLOGIC/LYMPHATIC NEGATIVE: 1
ALLERGIC/IMMUNOLOGIC NEGATIVE: 1
TREMORS: 0

## 2024-05-09 ASSESSMENT — PATIENT HEALTH QUESTIONNAIRE - PHQ9
SUM OF ALL RESPONSES TO PHQ9 QUESTIONS 1 AND 2: 0
1. LITTLE INTEREST OR PLEASURE IN DOING THINGS: NOT AT ALL
2. FEELING DOWN, DEPRESSED OR HOPELESS: NOT AT ALL

## 2024-05-09 ASSESSMENT — PAIN SCALES - GENERAL: PAINLEVEL: 3

## 2024-05-09 NOTE — PROGRESS NOTES
[facility]: [facility]  MEDICARE WELLNESS EXAM      Sally A Behanna is a 73 y.o. female that is presenting today for Annual Exam and Follow-up.    Assessment/Plan    Diagnoses and all orders for this visit:  Annual physical exam  Other screening mammogram  Gastroesophageal reflux disease without esophagitis  Mixed hyperlipidemia  -     Comprehensive Metabolic Panel; Future  -     Lipid Panel; Future  -     TSH with reflex to Free T4 if abnormal; Future  Vitamin D deficiency  -     Vitamin D 25-Hydroxy,Total (for eval of Vitamin D levels); Future  Osteopenia, unspecified location  Encounter for routine laboratory testing  -     Hemoglobin A1C; Future  Venous insufficiency  -     Referral to Vascular Surgery; Future  Age-related osteoporosis without current pathological fracture  -     XR DEXA bone density; Future  Encounter for screening for malignant neoplasm of colon  -     Referral to Gastroenterology; Future  Chest pain, unspecified type  -     XR chest 2 views; Future  -     CBC and Auto Differential; Future  Dizziness  -     MR brain wo IV contrast; Future  -     Referral to Neurology; Future  Prediabetes  -     Hemoglobin A1C; Future  Other orders  -     Follow Up In Primary Care - Health Maintenance  -     Pneumococcal conjugate vaccine, 20-valent (PREVNAR 20)  -     Follow Up In Primary Care - Established; Future  We completed the medicare wellness exam today.  The lifestyle is reviewed and recommendations for diet and exercise are made. We reviewed the immunizations and cancer screening and recommendations for needed immunizations and screenings are made.  The patient is independent in all ADL, shows no clinical signs of cognitive impairment, and is not falling or at risk for such.     In terms of some more specifics about her screenings it looks like her last colonoscopy was in 2014 and she is being referred to GI to update that.  She had a mammogram in February 2024 and she had a  bone density test in July 2022 showing osteopenia she will be due for her next bone density test later this summer.  In terms of her immunizations she had Tdap in 2016 and had Pneumovax in 2021 she has not yet had Prevnar 20 and this will be taken care of today.  Also interestingly it looks like she only had 1 Shingrix vaccine back in 2022 and never finished the series.  I recommend that she return to the pharmacy to do such.    In terms of her chronic medical problems took the time they evaluate and manage those.  Her cholesterol is under good control.  Her vitamin D is stable.  We ordered a bone density test as noted for her osteopenia.  She feels she has been choking at times from her acid reflux and will talk to the gastroenterologist about whether he wants to do an EGD at the same time as her colonoscopy.    In terms of her chest pain this is very atypical I will do a chest x-ray to make sure that nothing shows up in the posterior thorax area.  In terms of her dizziness this also is somewhat unexplained right now recall that she does see vascular already I will order an MRI of the brain and refer her to neurology for further follow-up of such.    I will plan on seeing her in 6 months  ADVANCED CARE PLANNING  Advanced Care Planning was discussed with patient:  The patient does not have an advanced care plan on file. The patient does not have an active surrogate decision-maker on file.  Encouraged the patient to confirm that Living Will and Healthcare Power of  (HCPoA) are accurate and up to date.  Encouraged the patient to confirm that our office be provided a copy of any documentation in the event that anything changes.    ACTIVITIES OF DAILY LIVING  Basic ADLs:  Bathing: Independent, Dressing: Independent, Toileting: Independent, Transferring: Independent, Continence: Independent, Feeding: Independent.    Instrumental ADLs:  Ability to use phone: Independent, Shopping: Independent, Cooking:  Independent, House-keeping: Independent, Laundry: Independent, Transportation: Independent, Medication Management: Independent, Finance Management: Independent.    Subjective   Pt here for wellness visit and follow up of her multiple medical issues - overall she is stabel and tolerating meds as noted; she had several eye surgeries and is seeing dr saavedra.  She professes that she has not really felt that well.  She has various and sundry symptoms.  She complains of a pain in her back when she leans against things in her right shoulder blade region.  Recall she also does keep regular follow-up with Dr. Sierra for her heart.  She complains that she is dizzy is a very nonspecific type of symptom when she turns her head certain ways or her arches her neck towards the back she will feel quite dizzy.  She has not had any syncope Kim no weakness or numbness in any of her extremities.  Recall that she is known to have some vascular disease and has been seeing Dr. Amaya for some carotid artery issues.      Review of Systems   Constitutional: Negative.    HENT: Negative.     Eyes: Negative.    Respiratory: Negative.     Cardiovascular: Negative.    Gastrointestinal: Negative.    Endocrine: Negative.    Genitourinary: Negative.    Musculoskeletal: Negative.    Skin: Negative.    Allergic/Immunologic: Negative.    Neurological:  Positive for dizziness. Negative for tremors, syncope and weakness.   Hematological: Negative.    Psychiatric/Behavioral: Negative.     All other systems reviewed and are negative.    Objective   Vitals:    05/09/24 1647   BP: 128/74   Pulse: 67   Temp: 36.1 °C (97 °F)   SpO2: 98%      Body mass index is 21.61 kg/m².  Physical Exam  Vitals and nursing note reviewed.   Constitutional:       General: She is not in acute distress.     Appearance: Normal appearance. She is not ill-appearing.   HENT:      Head: Normocephalic and atraumatic.      Right Ear: Hearing, tympanic membrane, ear canal and  external ear normal. There is no impacted cerumen.      Left Ear: Hearing, tympanic membrane, ear canal and external ear normal. There is no impacted cerumen.      Nose: Nose normal.      Mouth/Throat:      Mouth: Mucous membranes are moist.      Pharynx: Oropharynx is clear. No oropharyngeal exudate or posterior oropharyngeal erythema.   Eyes:      General: No scleral icterus.        Right eye: No discharge.         Left eye: No discharge.      Extraocular Movements: Extraocular movements intact.      Conjunctiva/sclera: Conjunctivae normal.      Pupils: Pupils are equal, round, and reactive to light.   Neck:      Vascular: No carotid bruit.   Cardiovascular:      Rate and Rhythm: Normal rate and regular rhythm.      Pulses: Normal pulses.      Heart sounds: Normal heart sounds. No murmur heard.     No friction rub. No gallop.   Pulmonary:      Effort: Pulmonary effort is normal. No respiratory distress.      Breath sounds: Normal breath sounds.   Abdominal:      General: Abdomen is flat. Bowel sounds are normal. There is no distension.      Palpations: Abdomen is soft.      Tenderness: There is no abdominal tenderness.      Hernia: No hernia is present.   Musculoskeletal:         General: No swelling or tenderness. Normal range of motion.   Lymphadenopathy:      Cervical: No cervical adenopathy.   Skin:     General: Skin is warm and dry.      Capillary Refill: Capillary refill takes less than 2 seconds.      Coloration: Skin is not jaundiced.      Findings: No rash.   Neurological:      General: No focal deficit present.      Mental Status: She is alert and oriented to person, place, and time. Mental status is at baseline.   Psychiatric:         Mood and Affect: Mood normal.         Behavior: Behavior normal.       Diagnostic Results   Lab Results   Component Value Date    GLUCOSE 103 (H) 05/06/2024    CALCIUM 9.2 05/06/2024     05/06/2024    K 4.3 05/06/2024    CO2 27 05/06/2024     05/06/2024    BUN  "8 05/06/2024    CREATININE 0.80 05/06/2024     Lab Results   Component Value Date    ALT 9 05/06/2024    AST 17 05/06/2024    ALKPHOS 73 05/06/2024    BILITOT 0.6 05/06/2024     Lab Results   Component Value Date    WBC 6.2 05/06/2024    HGB 12.0 05/06/2024    HCT 39.4 05/06/2024    MCV 90 05/06/2024     05/06/2024     Lab Results   Component Value Date    CHOL 166 05/06/2024    CHOL 157 10/23/2023    CHOL 185 03/24/2023     Lab Results   Component Value Date    HDL 48.0 (L) 05/06/2024    HDL 58.0 10/23/2023    HDL 54 03/24/2023     Lab Results   Component Value Date    LDLCALC 96 05/06/2024    LDLCALC 79 10/23/2023    LDLCALC 107 03/24/2023     Lab Results   Component Value Date    TRIG 111 05/06/2024    TRIG 102 10/23/2023    TRIG 121 03/24/2023     No components found for: \"CHOLHDL\"  Lab Results   Component Value Date    HGBA1C 6.0 (H) 05/06/2024     Other labs not included in the list above reviewed either before or during this encounter.    History   Past Medical History:   Diagnosis Date    Abnormal x-ray examination 03/27/2023    Age-related nuclear cataract of left eye 05/06/2024    Allergic contact dermatitis 05/06/2024    Autoimmune disorder (Multi)     Lichen Planus    Bilateral carotid artery stenosis 09/02/2023    Carotid artery disease (CMS-HCC) 09/02/2023    Cervical lymphadenopathy 05/06/2024    Cheilitis 05/06/2024    Contusion 05/06/2024    Cutis laxa 05/06/2024    Delayed emergence from general anesthesia     Dermatochalasis 09/02/2023    Disorder of carotid artery (CMS-HCC) 09/02/2023    Disorder of refraction 09/02/2023    Dry eye syndrome of unspecified lacrimal gland 05/17/2017    Dry eye syndrome    Dry eyes 09/02/2023    Dyslipidemia 09/02/2023    Dysphagia 05/06/2024    Essential hypertension 05/06/2024    Fall 05/06/2024    Female pelvic pain 11/14/2013    GERD (gastroesophageal reflux disease)     Heart valve disease     mitral valve prolapse with \"some\" regurgitation    History of " left cataract extraction 10/04/2023    Hollenhorst plaque, 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    Hordeolum externum unspecified eye, unspecified eyelid 05/17/2017    Stye    Hyperlipidemia     Immature cataract 02/09/2021    Impaired glucose tolerance 09/02/2023    Lichen sclerosus et atrophicus 09/02/2023    Mitral valve regurgitation 09/02/2023    Nonrheumatic aortic valve stenosis 05/06/2024    Oral lichen planus 05/06/2024    Osteopenia     Osteopenia 09/02/2023    Pain around eye 02/09/2021    Pain of right breast 09/02/2023    Pain of right upper extremity 05/06/2024    Personal history of diseases of the skin and subcutaneous tissue     History of lichen planus    Presence of intraocular lens 05/06/2024    Pseudophakia     Status post bilateral cataract extraction     Toxic effect of venom of bees, accidental (unintentional), initial encounter 05/17/2017    Bee sting reaction    Vaginal atrophy 09/02/2023    Vitamin D deficiency 09/02/2023     Past Surgical History:   Procedure Laterality Date    CATARACT EXTRACTION      bilateral cataract surgery    CHOLECYSTECTOMY      COLONOSCOPY  2003    COLONOSCOPY  2012    COLONOSCOPY  2014    ESOPHAGOGASTRODUODENOSCOPY  2012    ESOPHAGOGASTRODUODENOSCOPY  2014    MOUTH SURGERY  2018    OOPHORECTOMY Right     OTHER SURGICAL HISTORY  10/24/2022    right ovary removed    OTHER SURGICAL HISTORY  10/24/2022    OTHER SURGICAL HISTORY  10/24/2022    Cardiac catheterization    OTHER SURGICAL HISTORY  10/24/2022    Cholecystectomy    OTHER SURGICAL HISTORY      teeth pulled under anesthesia    PARS PLANA VITRECTOMY Right     2023     Family History   Problem Relation Name Age of Onset    Diabetes Mother      Heart disease Mother      Heart disease Father      No Known Problems Brother      Cancer Son      Breast cancer Mother's Sister       Social History     Socioeconomic History    Marital status:      Spouse name: Not on file    Number of children: Not on file    Years of  "education: Not on file    Highest education level: Not on file   Occupational History    Not on file   Tobacco Use    Smoking status: Never    Smokeless tobacco: Never   Vaping Use    Vaping status: Never Used   Substance and Sexual Activity    Alcohol use: Yes     Comment: has 1-2 drinks once a month.    Drug use: Never    Sexual activity: Not on file   Other Topics Concern    Not on file   Social History Narrative    Not on file     Social Determinants of Health     Financial Resource Strain: Not on file   Food Insecurity: Not on file   Transportation Needs: Not on file   Physical Activity: Not on file   Stress: Not on file   Social Connections: Not on file   Intimate Partner Violence: Not on file   Housing Stability: Not on file     Allergies   Allergen Reactions    Mercury Other and Unknown     \"Blister\"    Acrylic Acid Unknown     Pt doesn't remember reaction     Amoxicillin-Pot Clavulanate Nausea Only, Other and Unknown     nauseated    Bacitracin Rash    Balsam [place] Other and Unknown     Blisters on tongue    balsm of peru    Ciprofloxacin Nausea Only and Unknown    Codeine Other and Unknown     Pt doesn't remember reaction    feels like she is floating    Gold Au 198 Rash    Gold Salts Other     \"Redness/Erythema\"    Iodinated Contrast Media Hives    Ioversol Other    Latex Swelling and Unknown    Menthol Unknown     Pt doesn't remember reaction    Rubber, Unspecified Unknown    Sulfamethoxazole-Trimethoprim Unknown     Pt doesn't remember reaction     Current Outpatient Medications on File Prior to Visit   Medication Sig Dispense Refill    aspirin 81 mg EC tablet Take 1 tablet (81 mg) by mouth once daily.      atorvastatin (Lipitor) 80 mg tablet Take 1 tablet (80 mg) by mouth once daily. 90 tablet 2    biotin 1 mg capsule Take 1 capsule (1 mg) by mouth once every 24 hours.      cholecalciferol (Vitamin D-3) 25 MCG (1000 UT) capsule Take 2 capsules (50 mcg) by mouth once daily.      dexAMETHasone 0.5 mg/5 mL " solution SWISH AND SPIT 1/2 OZ 3 - 4 TIMES A DAY      esomeprazole (NexIUM) 20 mg DR capsule Take 2 capsules (40 mg) by mouth once daily in the morning. Take before meals.      ibuprofen (Motrin IB) 200 mg tablet Take 1 tablet (200 mg) by mouth 3 times a day as needed for mild pain (1 - 3).      nystatin-triamcinolone (Mycolog II) ointment       [DISCONTINUED] multivitamin tablet Take 1 tablet by mouth once daily.       No current facility-administered medications on file prior to visit.     Immunization History   Administered Date(s) Administered    DT (pediatric) 05/07/2006    Flu vaccine, quadrivalent, high-dose, preservative free, age 65y+ (FLUZONE) 11/02/2020, 11/01/2021, 10/26/2022, 10/27/2023    Influenza, High Dose Seasonal, Preservative Free 10/06/2016, 09/27/2018, 09/25/2019    Influenza, injectable, quadrivalent 10/10/2014, 12/16/2015, 11/06/2017    Influenza, seasonal, injectable 10/28/2011, 10/19/2012, 11/01/2013    Pfizer Purple Cap SARS-CoV-2 03/17/2021, 04/16/2021    Pneumococcal conjugate vaccine, 13-valent (PREVNAR 13) 05/05/2017    Pneumococcal polysaccharide vaccine, 23-valent, age 2 years and older (PNEUMOVAX 23) 11/27/2013, 01/08/2021    Tdap vaccine, age 7 year and older (BOOSTRIX, ADACEL) 10/06/2016, 07/03/2019    Zoster vaccine, recombinant, adult (SHINGRIX) 06/15/2022    Zoster, live 02/07/2011     Patient's medical history was reviewed and updated either before or during this encounter.     Gabino Mayorga MD

## 2024-05-09 NOTE — PATIENT INSTRUCTIONS
Denice we took you a chance today to get your Medicare wellness exam done and followed up/evaluated, manage dear multiple medical problems.    In terms of your wellness exam there are few things to note I applaud your efforts to get your advance directives updated so please complete that process.  In terms of your immunizations we updated your pneumonia vaccine today with Prevnar 20 and that will not be needed again.  Your last tetanus shot was in 2016 and that will not be needed again until 2026.  Interestingly you had 1 shingles shot back in 2022 with Shingrix.  You never finished the series and should return to the pharmacy to get your second dose and that we will complete that series.    In terms of your cancer screenings I your next mammogram will be due in February 2025.  I feel you are due for colonoscopy and refer you to GI for such as your last one was in 2014.    For your osteopenia I ordered a bone density test but this not quite due yet we will have to hold off on that one until July 2024.    In terms of your complaints of posterior chest pain I did order a chest x-ray to make sure that there is nothing going on with the ribs or the scapula.  Because of your dizziness I would like you to go talk to neurologist but I have also ordered an MRI of the brain to update the study as well.    I look forward to seeing you in 6 months I will let you know about the test that I am placing as noted above.

## 2024-05-10 ENCOUNTER — HOSPITAL ENCOUNTER (OUTPATIENT)
Dept: RADIOLOGY | Facility: CLINIC | Age: 74
Discharge: HOME | End: 2024-05-10
Payer: MEDICARE

## 2024-05-10 DIAGNOSIS — R07.9 CHEST PAIN, UNSPECIFIED TYPE: ICD-10-CM

## 2024-05-10 PROCEDURE — 71046 X-RAY EXAM CHEST 2 VIEWS: CPT

## 2024-05-10 PROCEDURE — 71046 X-RAY EXAM CHEST 2 VIEWS: CPT | Performed by: RADIOLOGY

## 2024-05-23 ENCOUNTER — HOSPITAL ENCOUNTER (OUTPATIENT)
Dept: RADIOLOGY | Facility: CLINIC | Age: 74
Discharge: HOME | End: 2024-05-23
Payer: MEDICARE

## 2024-05-23 DIAGNOSIS — R42 DIZZINESS: ICD-10-CM

## 2024-05-23 PROCEDURE — 70551 MRI BRAIN STEM W/O DYE: CPT

## 2024-05-23 PROCEDURE — 70551 MRI BRAIN STEM W/O DYE: CPT | Performed by: RADIOLOGY

## 2024-06-10 DIAGNOSIS — K22.5 ZENKER DIVERTICULUM: Primary | ICD-10-CM

## 2024-06-11 ENCOUNTER — TELEPHONE (OUTPATIENT)
Dept: GASTROENTEROLOGY | Facility: HOSPITAL | Age: 74
End: 2024-06-11
Payer: COMMERCIAL

## 2024-06-11 NOTE — TELEPHONE ENCOUNTER
"Called patient to go over Dr. Freedman's recommendations from the referral received from MADISYN Pimentel. Patient did not answer. LM to call back when available.     Michelle Rosa Maria was notified to contact patient to schedule her for a clinic consultation with Dr. Freedman.    ----- Message from Gabino Freedman DO sent at 6/10/2024  5:02 PM EDT -----  Regarding: RE: Referral ?  Yes, that's reasonable.  It would help a lot for her to have a barium esophagram beforehand if that was not already done recently.  Order placed.    Gabino Freedman DO    ----- Message -----  From: Diana Kyle RN  Sent: 6/10/2024  12:39 PM EDT  To: Gabino Freedman DO  Subject: Referral ?                                       Hi Dr. Freedman,    We received this referral from Marcio Carlson CNP for evaluation/endoscopic management of Zenkers diverticulum for this patient. Dr. Hickman agreed to have this patient seen by you for clinic. I just wanted to double check with you before adding this patient to your schedule.       Supporting clinical information from the referral:  Reason for Referral: Evaluation for endoscopic management of Zenkers diverticulum    EGD 6/5/2024 performed by Dr. Hucthison:  \" Indication: dysphagia, esophageal  \" Medium-sized zenker's diverticulum noted. This is the likely etiology of her symptoms  \" Benign appearing fundic gland polyps  \" Plan: Referral to Dr. Freedman at [facility] for evaluation for endoscopic management of Zenker's diverticulum.  Called referring to send EGD report with path and recent imaging on 6/7/2024        "

## 2024-06-11 NOTE — TELEPHONE ENCOUNTER
Spoke with patient on the phone. Patient requesting to see Dr. Freedman for a clinic consultation first prior to scheduling any procedures.     Michelle Crane was notified to contact the patient.

## 2024-06-13 ENCOUNTER — TELEPHONE (OUTPATIENT)
Dept: GASTROENTEROLOGY | Facility: CLINIC | Age: 74
End: 2024-06-13
Payer: COMMERCIAL

## 2024-06-19 ENCOUNTER — OFFICE VISIT (OUTPATIENT)
Dept: CARDIOLOGY | Facility: CLINIC | Age: 74
End: 2024-06-19
Payer: MEDICARE

## 2024-06-19 VITALS
WEIGHT: 140 LBS | DIASTOLIC BLOOD PRESSURE: 60 MMHG | HEART RATE: 63 BPM | SYSTOLIC BLOOD PRESSURE: 102 MMHG | BODY MASS INDEX: 21.93 KG/M2 | OXYGEN SATURATION: 98 %

## 2024-06-19 DIAGNOSIS — E78.2 MIXED HYPERLIPIDEMIA: ICD-10-CM

## 2024-06-19 DIAGNOSIS — I35.0 NONRHEUMATIC AORTIC VALVE STENOSIS: ICD-10-CM

## 2024-06-19 DIAGNOSIS — I65.23 BILATERAL CAROTID ARTERY STENOSIS: Primary | ICD-10-CM

## 2024-06-19 PROCEDURE — 1036F TOBACCO NON-USER: CPT | Performed by: INTERNAL MEDICINE

## 2024-06-19 PROCEDURE — 1126F AMNT PAIN NOTED NONE PRSNT: CPT | Performed by: INTERNAL MEDICINE

## 2024-06-19 PROCEDURE — 99214 OFFICE O/P EST MOD 30 MIN: CPT | Performed by: INTERNAL MEDICINE

## 2024-06-19 PROCEDURE — 1159F MED LIST DOCD IN RCRD: CPT | Performed by: INTERNAL MEDICINE

## 2024-06-19 ASSESSMENT — ENCOUNTER SYMPTOMS
DYSPNEA ON EXERTION: 0
DIAPHORESIS: 0
WEAKNESS: 0
WHEEZING: 0
SHORTNESS OF BREATH: 0
COUGH: 0
DIZZINESS: 1
MYALGIAS: 0
SYNCOPE: 0
CLAUDICATION: 0
PALPITATIONS: 0
WEIGHT LOSS: 0
ORTHOPNEA: 0
NEAR-SYNCOPE: 0
WEIGHT GAIN: 0
IRREGULAR HEARTBEAT: 0
PND: 0
FEVER: 0

## 2024-06-19 ASSESSMENT — PAIN SCALES - GENERAL: PAINLEVEL: 0-NO PAIN

## 2024-06-19 NOTE — ASSESSMENT & PLAN NOTE
She follows with Dr Amaya, has not chosen a replacement as of yet. She was just seen last month, carotid US shows stable disease. Continue high potency statin

## 2024-06-19 NOTE — ASSESSMENT & PLAN NOTE
LDL is 96, would like to see less than 70. Would like to add repatha she wishes to work on dietary changes and regular physical exercise. Will reassess in 6m

## 2024-06-19 NOTE — PROGRESS NOTES
Subjective      Chief Complaint   Patient presents with    Bilateral carotid artery stenosis     6 month f/u        73-year-old female with hypertension, hyperlipidemia and evidence atherosclerotic disease (Hollenhorst plaque). She presents for follow-up. She has mild Aortic stenosis. She also has a history of carotid artery stenosis and follows regularly with [facility], 50-60% JA stenosis, mild LICA stenosis. She was last seen 6 months ago. We had planned on an echocardiogram for AV reassessment. She was recently diagnosed with a Zenkers diverticulum.          Review of Systems   Constitutional: Negative for diaphoresis, fever, weight gain and weight loss.   Eyes:  Negative for visual disturbance.   Cardiovascular:  Negative for chest pain, claudication, dyspnea on exertion, irregular heartbeat, leg swelling, near-syncope, orthopnea, palpitations, paroxysmal nocturnal dyspnea and syncope.   Respiratory:  Negative for cough, shortness of breath and wheezing.    Musculoskeletal:  Negative for muscle weakness and myalgias.   Neurological:  Positive for dizziness. Negative for weakness.   All other systems reviewed and are negative.       Past Medical History:   Diagnosis Date    Abnormal x-ray examination 03/27/2023    Age-related nuclear cataract of left eye 05/06/2024    Allergic contact dermatitis 05/06/2024    Autoimmune disorder (Multi)     Lichen Planus    Bilateral carotid artery stenosis 09/02/2023    Carotid artery disease (CMS-[facility]) 09/02/2023    Cervical lymphadenopathy 05/06/2024    Cheilitis 05/06/2024    Contusion 05/06/2024    Cutis laxa 05/06/2024    Delayed emergence from general anesthesia     Dermatochalasis 09/02/2023    Disorder of carotid artery (CMS-[facility]) 09/02/2023    Disorder of refraction 09/02/2023    Dry eye syndrome of unspecified lacrimal gland 05/17/2017    Dry eye syndrome    Dry eyes 09/02/2023    Dyslipidemia 09/02/2023    Dysphagia 05/06/2024    Essential hypertension 05/06/2024    Fall  "05/06/2024    Female pelvic pain 11/14/2013    GERD (gastroesophageal reflux disease)     Heart valve disease     mitral valve prolapse with \"some\" regurgitation    History of left cataract extraction 10/04/2023    Hollenhorst plaque, 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    Hordeolum externum unspecified eye, unspecified eyelid 05/17/2017    Stye    Hyperlipidemia     Immature cataract 02/09/2021    Impaired glucose tolerance 09/02/2023    Lichen sclerosus et atrophicus 09/02/2023    Mitral valve regurgitation 09/02/2023    Nonrheumatic aortic valve stenosis 05/06/2024    Oral lichen planus 05/06/2024    Osteopenia     Osteopenia 09/02/2023    Pain around eye 02/09/2021    Pain of right breast 09/02/2023    Pain of right upper extremity 05/06/2024    Personal history of diseases of the skin and subcutaneous tissue     History of lichen planus    Presence of intraocular lens 05/06/2024    Pseudophakia     Status post bilateral cataract extraction     Toxic effect of venom of bees, accidental (unintentional), initial encounter 05/17/2017    Bee sting reaction    Vaginal atrophy 09/02/2023    Vitamin D deficiency 09/02/2023        Past Surgical History:   Procedure Laterality Date    CATARACT EXTRACTION      bilateral cataract surgery    CHOLECYSTECTOMY      COLONOSCOPY  2003    COLONOSCOPY  2012    COLONOSCOPY  2014    ESOPHAGOGASTRODUODENOSCOPY  2012    ESOPHAGOGASTRODUODENOSCOPY  2014    MOUTH SURGERY  2018    OOPHORECTOMY Right     OTHER SURGICAL HISTORY  10/24/2022    right ovary removed    OTHER SURGICAL HISTORY  10/24/2022    OTHER SURGICAL HISTORY  10/24/2022    Cardiac catheterization    OTHER SURGICAL HISTORY  10/24/2022    Cholecystectomy    OTHER SURGICAL HISTORY      teeth pulled under anesthesia    PARS PLANA VITRECTOMY Right     2023        Social History     Socioeconomic History    Marital status:      Spouse name: Not on file    Number of children: Not on file    Years of education: Not on file    Highest " education level: Not on file   Occupational History    Not on file   Tobacco Use    Smoking status: Former     Types: Cigarettes    Smokeless tobacco: Never   Vaping Use    Vaping status: Never Used   Substance and Sexual Activity    Alcohol use: Yes    Drug use: Never    Sexual activity: Not on file   Other Topics Concern    Not on file   Social History Narrative    Not on file     Social Determinants of Health     Financial Resource Strain: Not on file   Food Insecurity: Not on file   Transportation Needs: Not on file   Physical Activity: Not on file   Stress: Not on file   Social Connections: Not on file   Intimate Partner Violence: Not on file   Housing Stability: Not on file        Family History   Problem Relation Name Age of Onset    Diabetes Mother      Heart disease Mother      Heart disease Father      No Known Problems Brother      Cancer Son      Breast cancer Mother's Sister          OBJECTIVE:    Vitals:    06/19/24 1026   BP: 102/60   Pulse: 63   SpO2: 98%        Vitals reviewed.   Constitutional:       Appearance: Normal and healthy appearance. Not in distress.   Pulmonary:      Effort: Pulmonary effort is normal.      Breath sounds: Normal breath sounds.   Cardiovascular:      Normal rate. Regular rhythm. Normal S1. Normal S2.       Murmurs: There is no murmur.      No gallop.  No click.   Pulses:     Intact distal pulses.   Edema:     Peripheral edema absent.   Skin:     General: Skin is warm and dry.   Neurological:      General: No focal deficit present.          Lab Review:   Lab Results   Component Value Date    CHOL 166 05/06/2024    CHOL 157 10/23/2023    CHOL 185 03/24/2023    CHOL 210 (H) 09/03/2022    CHOL 159 01/14/2022    TRIG 111 05/06/2024    TRIG 102 10/23/2023    TRIG 121 03/24/2023    TRIG 118 09/03/2022    TRIG 89 01/14/2022    HDL 48.0 (L) 05/06/2024    HDL 58.0 10/23/2023    HDL 54 03/24/2023    HDL 58 09/03/2022    HDL 53 01/14/2022       Lab Results   Component Value Date     LDLCALC 96 05/06/2024        Bilateral carotid artery stenosis  She follows with Dr Amaya, has not chosen a replacement as of yet. She was just seen last month, carotid US shows stable disease. Continue high potency statin    Nonrheumatic aortic valve stenosis  Mild progressive stage B. Will again arrange for an echocardiogram    Hyperlipidemia  LDL is 96, would like to see less than 70. Would like to add repatha she wishes to work on dietary changes and regular physical exercise. Will reassess in 6m

## 2024-06-20 ENCOUNTER — LAB (OUTPATIENT)
Dept: LAB | Facility: LAB | Age: 74
End: 2024-06-20
Payer: MEDICARE

## 2024-06-20 ENCOUNTER — APPOINTMENT (OUTPATIENT)
Dept: NEUROLOGY | Facility: CLINIC | Age: 74
End: 2024-06-20
Payer: MEDICARE

## 2024-06-20 VITALS
HEART RATE: 68 BPM | HEIGHT: 67 IN | BODY MASS INDEX: 21.19 KG/M2 | DIASTOLIC BLOOD PRESSURE: 66 MMHG | SYSTOLIC BLOOD PRESSURE: 104 MMHG | WEIGHT: 135 LBS

## 2024-06-20 DIAGNOSIS — R27.8 SENSORY ATAXIA: ICD-10-CM

## 2024-06-20 DIAGNOSIS — R27.8 SENSORY ATAXIA: Primary | ICD-10-CM

## 2024-06-20 LAB — VIT B12 SERPL-MCNC: 327 PG/ML (ref 211–911)

## 2024-06-20 PROCEDURE — 82607 VITAMIN B-12: CPT

## 2024-06-20 PROCEDURE — 36415 COLL VENOUS BLD VENIPUNCTURE: CPT

## 2024-06-20 PROCEDURE — 1036F TOBACCO NON-USER: CPT | Performed by: PSYCHIATRY & NEUROLOGY

## 2024-06-20 PROCEDURE — 1159F MED LIST DOCD IN RCRD: CPT | Performed by: PSYCHIATRY & NEUROLOGY

## 2024-06-20 PROCEDURE — 99215 OFFICE O/P EST HI 40 MIN: CPT | Performed by: PSYCHIATRY & NEUROLOGY

## 2024-06-20 NOTE — PROGRESS NOTES
Subjective   Sally A Behanna is a 73 y.o.   female.  HPI  This is a 73 year old woman seen previously in 12/2022 for a benign, muscle contraction type headache.  She started having intermittent dizziness 2-3 months ago.  Sometimes she feels off-balance in the house, no falls.  She had eye surgery last September-October, first a cataract surgery on both sides, two surgeries, then they found a hole in the retina.    She has blurred vision in the right eye.  She has a mix of light headed/off-balance.  There is no dizziness sitting, lying down.  The right lens and retina are attached- vision is blurry out of the right eye.  MRI of the brain without contrast 5/23/2024, reviewed with the patient:  showed chronic microvascular disease- moderate in degree.    Objective   Neurological Exam  Alert and pleasant older man.  Normal speech.  Face is symmetric.  Normal muscle strength throughout.  Sensation: normal proprioception, soft touch.  Gait- unsteady, wavering with eyes closed  Physical Exam  I personally reviewed laboratory, radiographic, and medical studies which were pertinent for nothing.    Assessment/Plan

## 2024-06-20 NOTE — PATIENT INSTRUCTIONS
It was good to see you today for dizziness.  I think your balance is impaired, may be related to your chronic microvascular white matter changes (chronic), visual impairment in the right eye.  I recommend having the vitamin B12 level drawn and try physical therapy.  Telephone call with progress in 2-3 months.

## 2024-06-28 ENCOUNTER — TELEPHONE (OUTPATIENT)
Dept: OPHTHALMOLOGY | Facility: CLINIC | Age: 74
End: 2024-06-28
Payer: MEDICARE

## 2024-06-28 NOTE — TELEPHONE ENCOUNTER
Spoke with Denice. Had white cloud come over vision today left eye (OS). Denies new floaters or flashes. Has been leary about lubricants due to recalls but used since. Does feel like improving but still not fully resolved. Will continue drops through weekend and call early next week with update. If not improved or resolved will fit in for checkup.

## 2024-07-01 ENCOUNTER — TELEPHONE (OUTPATIENT)
Dept: OPHTHALMOLOGY | Facility: CLINIC | Age: 74
End: 2024-07-01
Payer: MEDICARE

## 2024-07-01 NOTE — TELEPHONE ENCOUNTER
Pt said that all of her symptoms from Friday are gone. She has appt 7/10/24 said she would come in sooner if you think she should.

## 2024-07-10 ENCOUNTER — APPOINTMENT (OUTPATIENT)
Dept: OPHTHALMOLOGY | Facility: CLINIC | Age: 74
End: 2024-07-10
Payer: MEDICARE

## 2024-07-10 DIAGNOSIS — H35.341 MACULAR HOLE, RIGHT EYE: ICD-10-CM

## 2024-07-10 DIAGNOSIS — H04.129 DRY EYE: ICD-10-CM

## 2024-07-10 DIAGNOSIS — H52.7 REFRACTIVE ERROR: ICD-10-CM

## 2024-07-10 DIAGNOSIS — Z96.1 PSEUDOPHAKIA OF BOTH EYES: Primary | ICD-10-CM

## 2024-07-10 PROBLEM — H25.11 AGE-RELATED NUCLEAR CATARACT OF RIGHT EYE: Status: RESOLVED | Noted: 2023-09-02 | Resolved: 2024-07-10

## 2024-07-10 PROBLEM — Z98.41 STATUS POST RIGHT CATARACT EXTRACTION: Status: RESOLVED | Noted: 2023-10-04 | Resolved: 2024-07-10

## 2024-07-10 PROBLEM — Z98.42 STATUS POST LEFT CATARACT EXTRACTION: Status: RESOLVED | Noted: 2023-10-04 | Resolved: 2024-07-10

## 2024-07-10 PROCEDURE — 99212 OFFICE O/P EST SF 10 MIN: CPT | Performed by: OPHTHALMOLOGY

## 2024-07-10 RX ORDER — ACETAMINOPHEN 500 MG
TABLET ORAL
COMMUNITY

## 2024-07-10 RX ORDER — POLYETHYLENE GLYCOL-3350 AND ELECTROLYTES 236; 6.74; 5.86; 2.97; 22.74 G/274.31G; G/274.31G; G/274.31G; G/274.31G; G/274.31G
POWDER, FOR SOLUTION ORAL
COMMUNITY
Start: 2024-05-14

## 2024-07-10 ASSESSMENT — ENCOUNTER SYMPTOMS
GASTROINTESTINAL NEGATIVE: 0
ENDOCRINE NEGATIVE: 0
PSYCHIATRIC NEGATIVE: 0
HEMATOLOGIC/LYMPHATIC NEGATIVE: 0
CONSTITUTIONAL NEGATIVE: 0
NEUROLOGICAL NEGATIVE: 0
EYES NEGATIVE: 0
RESPIRATORY NEGATIVE: 0
CARDIOVASCULAR NEGATIVE: 0
MUSCULOSKELETAL NEGATIVE: 0
ALLERGIC/IMMUNOLOGIC NEGATIVE: 0

## 2024-07-10 ASSESSMENT — REFRACTION_WEARINGRX
OD_SPHERE: -0.00
OD_ADD: +2.50
OS_CYLINDER: -1.25
SPECS_TYPE: PAL
OS_AXIS: 080
OD_CYLINDER: -1.50
OS_ADD: +2.50
OD_AXIS: 103
OS_SPHERE: -0.00

## 2024-07-10 ASSESSMENT — SLIT LAMP EXAM - LIDS
COMMENTS: NORMAL
COMMENTS: NORMAL

## 2024-07-10 ASSESSMENT — VISUAL ACUITY
OS_CC: 20/30
METHOD: SNELLEN - LINEAR
OD_CC+: +1
OD_CC: 20/100
CORRECTION_TYPE: GLASSES
OS_CC+: +2

## 2024-07-10 ASSESSMENT — EXTERNAL EXAM - RIGHT EYE: OD_EXAM: NORMAL

## 2024-07-10 ASSESSMENT — EXTERNAL EXAM - LEFT EYE: OS_EXAM: NORMAL

## 2024-07-10 ASSESSMENT — PAIN SCALES - GENERAL: PAINLEVEL: 0-NO PAIN

## 2024-07-10 NOTE — ASSESSMENT & PLAN NOTE
Copy of previous RX given, OK to have made as sunglasses. Should have no issues with licensing but would be happy to fill out paperwork with our vision testing as needed.

## 2024-07-10 NOTE — ASSESSMENT & PLAN NOTE
Stable optic intraocular lens (IOL) capture with iris. May be playing some role in vision but suspect more likely secondary to macular hole history. Will be seeing Dr. Arnold to assess if repositioning would be recommended.

## 2024-07-10 NOTE — PROGRESS NOTES
Assessment/Plan   Problem List Items Addressed This Visit       Dry eye     Advised to continue frequent regular lubrication, suspect was root cause of recent blurring episodes left eye (OS).          Refractive error     Copy of previous RX given, OK to have made as sunglasses. Should have no issues with licensing but would be happy to fill out paperwork with our vision testing as needed.          Macular hole, right eye     Following closely with Dr. Jacobs and retina service.          Pseudophakia of both eyes - Primary     Stable optic intraocular lens (IOL) capture with iris. May be playing some role in vision but suspect more likely secondary to macular hole history. Will be seeing Dr. Arnold to assess if repositioning would be recommended.             Provided reassurance regarding above diagnoses and care received in the office visit today. Discussed outcomes and options along with the importance of treatment compliance. Understands the importance of any follow up visits. Patient instructed to call/communicate with our office if any new issues, questions, or concerns.     Will plan to see back in 6 months full or sooner PRN

## 2024-07-10 NOTE — PATIENT INSTRUCTIONS
Thank you so much for choosing me to provide your care today!    If you were dilated your vision may remain blurry   or light sensitive for several hours.    The nature of eye and vision problems can require frequent follow up, please make every effort to adhere to any future appointments.    If you have any issues, questions, or concerns,   please do not hesitate to reach out.    If you receive a survey in regards to your care today, please mention any exceptional care my office staff and/or technicians provided.    You can reach our office at this number:  [phone]

## 2024-07-10 NOTE — ASSESSMENT & PLAN NOTE
Advised to continue frequent regular lubrication, suspect was root cause of recent blurring episodes left eye (OS).

## 2024-07-11 ENCOUNTER — HOSPITAL ENCOUNTER (OUTPATIENT)
Dept: CARDIOLOGY | Facility: HOSPITAL | Age: 74
Discharge: HOME | End: 2024-07-11
Payer: MEDICARE

## 2024-07-11 DIAGNOSIS — I35.0 NONRHEUMATIC AORTIC VALVE STENOSIS: ICD-10-CM

## 2024-07-11 LAB
AORTIC VALVE MEAN GRADIENT: 14 MMHG
AORTIC VALVE PEAK VELOCITY: 2.52 M/S
AV PEAK GRADIENT: 25.4 MMHG
AVA (PEAK VEL): 1.22 CM2
AVA (VTI): 1.24 CM2
EJECTION FRACTION APICAL 4 CHAMBER: 66.2
EJECTION FRACTION: 68 %
LEFT ATRIUM VOLUME AREA LENGTH INDEX BSA: 27.5 ML/M2
LEFT VENTRICLE INTERNAL DIMENSION DIASTOLE: 3.93 CM (ref 3.5–6)
LEFT VENTRICULAR OUTFLOW TRACT DIAMETER: 2 CM
LV EJECTION FRACTION BIPLANE: 67 %
MITRAL VALVE E/A RATIO: 0.97
RIGHT VENTRICLE FREE WALL PEAK S': 12.9 CM/S
RIGHT VENTRICLE PEAK SYSTOLIC PRESSURE: 29.6 MMHG
TRICUSPID ANNULAR PLANE SYSTOLIC EXCURSION: 2.8 CM

## 2024-07-11 PROCEDURE — 93306 TTE W/DOPPLER COMPLETE: CPT

## 2024-07-11 PROCEDURE — 93306 TTE W/DOPPLER COMPLETE: CPT | Performed by: INTERNAL MEDICINE

## 2024-07-12 ENCOUNTER — TELEPHONE (OUTPATIENT)
Dept: CARDIOLOGY | Facility: HOSPITAL | Age: 74
End: 2024-07-12

## 2024-07-12 ENCOUNTER — HOSPITAL ENCOUNTER (OUTPATIENT)
Dept: RADIOLOGY | Facility: HOSPITAL | Age: 74
Discharge: HOME | End: 2024-07-12
Payer: MEDICARE

## 2024-07-12 ENCOUNTER — APPOINTMENT (OUTPATIENT)
Dept: OPHTHALMOLOGY | Facility: CLINIC | Age: 74
End: 2024-07-12
Payer: MEDICARE

## 2024-07-12 DIAGNOSIS — K22.5 ZENKER DIVERTICULUM: ICD-10-CM

## 2024-07-12 PROCEDURE — A9698 NON-RAD CONTRAST MATERIALNOC: HCPCS | Performed by: INTERNAL MEDICINE

## 2024-07-12 PROCEDURE — 2500000005 HC RX 250 GENERAL PHARMACY W/O HCPCS: Performed by: INTERNAL MEDICINE

## 2024-07-12 PROCEDURE — 74220 X-RAY XM ESOPHAGUS 1CNTRST: CPT | Performed by: RADIOLOGY

## 2024-07-12 PROCEDURE — 74220 X-RAY XM ESOPHAGUS 1CNTRST: CPT

## 2024-07-12 NOTE — TELEPHONE ENCOUNTER
Called her cell phone, daughter answered.  We discussed her echocardiogram results, mild progression of her aortic stenosis nothing to worry about.  Will plan on repeating an echocardiogram in 1 to 2 years.

## 2024-07-16 ENCOUNTER — APPOINTMENT (OUTPATIENT)
Dept: OPHTHALMOLOGY | Facility: CLINIC | Age: 74
End: 2024-07-16
Payer: MEDICARE

## 2024-07-16 ENCOUNTER — TELEPHONE (OUTPATIENT)
Dept: OPHTHALMOLOGY | Facility: CLINIC | Age: 74
End: 2024-07-16

## 2024-07-16 DIAGNOSIS — T85.29XA MECHANICAL COMPLICATION DUE TO INTRAOCULAR LENS IMPLANT, INITIAL ENCOUNTER: Primary | ICD-10-CM

## 2024-07-16 PROCEDURE — 99214 OFFICE O/P EST MOD 30 MIN: CPT | Performed by: OPHTHALMOLOGY

## 2024-07-16 PROCEDURE — 92285 EXTERNAL OCULAR PHOTOGRAPHY: CPT | Mod: RIGHT SIDE | Performed by: OPHTHALMOLOGY

## 2024-07-16 RX ORDER — TROPICAMIDE 10 MG/ML
1 SOLUTION/ DROPS OPHTHALMIC
OUTPATIENT
Start: 2024-07-16 | End: 2024-07-16

## 2024-07-16 RX ORDER — MOXIFLOXACIN 5 MG/ML
1 SOLUTION/ DROPS OPHTHALMIC 3 TIMES DAILY
OUTPATIENT
Start: 2024-07-16

## 2024-07-16 RX ORDER — CYCLOPENTOLATE HYDROCHLORIDE 10 MG/ML
1 SOLUTION/ DROPS OPHTHALMIC
OUTPATIENT
Start: 2024-07-16 | End: 2024-07-16

## 2024-07-16 RX ORDER — PHENYLEPHRINE HYDROCHLORIDE 25 MG/ML
1 SOLUTION/ DROPS OPHTHALMIC
OUTPATIENT
Start: 2024-07-16 | End: 2024-07-16

## 2024-07-16 RX ORDER — TETRACAINE HYDROCHLORIDE 5 MG/ML
1 SOLUTION OPHTHALMIC ONCE
OUTPATIENT
Start: 2024-07-16 | End: 2024-07-16

## 2024-07-16 ASSESSMENT — CONF VISUAL FIELD
OS_SUPERIOR_TEMPORAL_RESTRICTION: 0
OD_INFERIOR_NASAL_RESTRICTION: 0
OS_NORMAL: 1
OD_SUPERIOR_NASAL_RESTRICTION: 0
OS_INFERIOR_TEMPORAL_RESTRICTION: 0
OD_INFERIOR_TEMPORAL_RESTRICTION: 0
OD_SUPERIOR_TEMPORAL_RESTRICTION: 0
OS_SUPERIOR_NASAL_RESTRICTION: 0
OD_NORMAL: 1
OS_INFERIOR_NASAL_RESTRICTION: 0

## 2024-07-16 ASSESSMENT — SLIT LAMP EXAM - LIDS
COMMENTS: NORMAL
COMMENTS: NORMAL

## 2024-07-16 ASSESSMENT — ENCOUNTER SYMPTOMS: EYES NEGATIVE: 1

## 2024-07-16 ASSESSMENT — CUP TO DISC RATIO: OD_RATIO: 0.3

## 2024-07-16 ASSESSMENT — VISUAL ACUITY
METHOD: SNELLEN - LINEAR
OS_CC: 20/25

## 2024-07-16 ASSESSMENT — TONOMETRY
OD_IOP_MMHG: 14
IOP_METHOD: TONOPEN
OS_IOP_MMHG: 15

## 2024-07-16 ASSESSMENT — EXTERNAL EXAM - LEFT EYE: OS_EXAM: NORMAL

## 2024-07-16 ASSESSMENT — EXTERNAL EXAM - RIGHT EYE: OD_EXAM: NORMAL

## 2024-07-16 NOTE — TELEPHONE ENCOUNTER
PT CALLED TO LET YOU KNOW SHE SAW DR. DE LA TORRE AND WILL BE HAVING SURGERY ON 7/25/24 FYI ONLY-TO[facility]

## 2024-07-18 ENCOUNTER — APPOINTMENT (OUTPATIENT)
Dept: OPHTHALMOLOGY | Facility: CLINIC | Age: 74
End: 2024-07-18
Payer: MEDICARE

## 2024-07-23 ENCOUNTER — OFFICE VISIT (OUTPATIENT)
Dept: GASTROENTEROLOGY | Facility: CLINIC | Age: 74
End: 2024-07-23
Payer: MEDICARE

## 2024-07-23 VITALS
TEMPERATURE: 97 F | DIASTOLIC BLOOD PRESSURE: 69 MMHG | HEIGHT: 67 IN | BODY MASS INDEX: 20.83 KG/M2 | WEIGHT: 132.7 LBS | SYSTOLIC BLOOD PRESSURE: 121 MMHG | HEART RATE: 66 BPM

## 2024-07-23 DIAGNOSIS — R13.19 ESOPHAGEAL DYSPHAGIA: ICD-10-CM

## 2024-07-23 DIAGNOSIS — K22.5 ZENKER DIVERTICULUM: Primary | ICD-10-CM

## 2024-07-23 PROCEDURE — 99214 OFFICE O/P EST MOD 30 MIN: CPT | Performed by: INTERNAL MEDICINE

## 2024-07-23 PROCEDURE — 1126F AMNT PAIN NOTED NONE PRSNT: CPT | Performed by: INTERNAL MEDICINE

## 2024-07-23 PROCEDURE — 1159F MED LIST DOCD IN RCRD: CPT | Performed by: INTERNAL MEDICINE

## 2024-07-23 ASSESSMENT — PAIN SCALES - GENERAL: PAINLEVEL: 0-NO PAIN

## 2024-07-23 NOTE — PATIENT INSTRUCTIONS
Your symptoms are classing for a Zenker's diverticulum  Both endoscopy myotomy and surgery with ENT are options  There is no rush and I suggest you consider all things, including healing from your vision surgery this coming month

## 2024-07-23 NOTE — PROGRESS NOTES
"Subjective   Patient ID: Sally A Behanna is a 73 y.o. female who presents for New Patient Visit.  Referred by Dr. Hutchison for Zenker's diverticulum  Daughter in visit on Facetime     Reviewed images from barium swallow - midline diverticulum 3 cm x 2 cm    Symptoms began about 10 years ago and an ENT noted by history a problem in the cervical esophagus and recommended mineral oil treatment right then and there for an acute obstruction  She ended up having an EGD with dilation which helped for many years - Dr. Lobato - he did not see the diverticulum     Recently EGD noted diverticulum 6-5-2024 with wide os to tic    Dysphagia occurs a couple of times a week  Two episodes were very difficult and had coughing with air way penetration  Used the \"oil mixture\" to relieve obstruction - it either \"goes up or down\"    Dr. Fernandez - ENT for her  and another family member with cancer          Review of Systems    Objective   Physical Exam  Constitutional:       Appearance: Normal appearance. She is normal weight.   HENT:      Head: Normocephalic.      Nose: Nose normal.      Mouth/Throat:      Mouth: Mucous membranes are moist.      Pharynx: Oropharynx is clear.   Pulmonary:      Effort: Pulmonary effort is normal.   Abdominal:      General: Abdomen is flat.      Palpations: Abdomen is soft.   Musculoskeletal:      Cervical back: Normal range of motion and neck supple. No rigidity or tenderness.   Lymphadenopathy:      Cervical: No cervical adenopathy.   Neurological:      Mental Status: She is alert.   Psychiatric:         Mood and Affect: Mood normal.         Behavior: Behavior normal.         Judgment: Judgment normal.         Assessment/Plan   Problem List Items Addressed This Visit             ICD-10-CM    Esophageal dysphagia R13.19    Relevant Orders    Esophagogastroduodenoscopy (EGD) w Zenker's Diverticulectomy    Zenker diverticulum - Primary K22.5    Relevant Orders    Esophagogastroduodenoscopy (EGD) w " Zenker's Diverticulectomy     Discussed surgical vs endoscopic myotomy - no rush to intervene        Gabino Freedman DO 07/23/24 12:01 PM

## 2024-07-25 ENCOUNTER — APPOINTMENT (OUTPATIENT)
Dept: PHYSICAL THERAPY | Facility: CLINIC | Age: 74
End: 2024-07-25
Payer: MEDICARE

## 2024-07-25 ENCOUNTER — ANESTHESIA EVENT (OUTPATIENT)
Dept: OPERATING ROOM | Facility: CLINIC | Age: 74
End: 2024-07-25
Payer: MEDICARE

## 2024-07-25 ENCOUNTER — HOSPITAL ENCOUNTER (OUTPATIENT)
Facility: CLINIC | Age: 74
Setting detail: OUTPATIENT SURGERY
Discharge: HOME | End: 2024-07-25
Attending: OPHTHALMOLOGY | Admitting: OPHTHALMOLOGY
Payer: MEDICARE

## 2024-07-25 ENCOUNTER — ANESTHESIA (OUTPATIENT)
Dept: OPERATING ROOM | Facility: CLINIC | Age: 74
End: 2024-07-25
Payer: MEDICARE

## 2024-07-25 ENCOUNTER — APPOINTMENT (OUTPATIENT)
Dept: OPHTHALMOLOGY | Facility: CLINIC | Age: 74
End: 2024-07-25
Payer: MEDICARE

## 2024-07-25 VITALS
OXYGEN SATURATION: 99 % | WEIGHT: 137.13 LBS | HEIGHT: 67 IN | TEMPERATURE: 97.5 F | SYSTOLIC BLOOD PRESSURE: 139 MMHG | RESPIRATION RATE: 16 BRPM | DIASTOLIC BLOOD PRESSURE: 64 MMHG | HEART RATE: 67 BPM | BODY MASS INDEX: 21.52 KG/M2

## 2024-07-25 DIAGNOSIS — T85.29XA MECHANICAL COMPLICATION DUE TO INTRAOCULAR LENS IMPLANT, INITIAL ENCOUNTER: Primary | ICD-10-CM

## 2024-07-25 PROCEDURE — 2500000004 HC RX 250 GENERAL PHARMACY W/ HCPCS (ALT 636 FOR OP/ED): Performed by: OPHTHALMOLOGY

## 2024-07-25 PROCEDURE — 2500000005 HC RX 250 GENERAL PHARMACY W/O HCPCS

## 2024-07-25 PROCEDURE — 99024 POSTOP FOLLOW-UP VISIT: CPT | Performed by: OPHTHALMOLOGY

## 2024-07-25 PROCEDURE — 2500000005 HC RX 250 GENERAL PHARMACY W/O HCPCS: Performed by: OPHTHALMOLOGY

## 2024-07-25 PROCEDURE — 2500000004 HC RX 250 GENERAL PHARMACY W/ HCPCS (ALT 636 FOR OP/ED)

## 2024-07-25 PROCEDURE — 7100000009 HC PHASE TWO TIME - INITIAL BASE CHARGE: Performed by: OPHTHALMOLOGY

## 2024-07-25 PROCEDURE — 2500000001 HC RX 250 WO HCPCS SELF ADMINISTERED DRUGS (ALT 637 FOR MEDICARE OP): Performed by: OPHTHALMOLOGY

## 2024-07-25 PROCEDURE — A66986 PR EXCHANGE LENS PROSTHESIS: Performed by: ANESTHESIOLOGY

## 2024-07-25 PROCEDURE — 3600000003 HC OR TIME - INITIAL BASE CHARGE - PROCEDURE LEVEL THREE: Performed by: OPHTHALMOLOGY

## 2024-07-25 PROCEDURE — 99100 ANES PT EXTEME AGE<1 YR&>70: CPT | Performed by: ANESTHESIOLOGY

## 2024-07-25 PROCEDURE — A66986 PR EXCHANGE LENS PROSTHESIS

## 2024-07-25 PROCEDURE — 2720000007 HC OR 272 NO HCPCS: Performed by: OPHTHALMOLOGY

## 2024-07-25 PROCEDURE — 3600000008 HC OR TIME - EACH INCREMENTAL 1 MINUTE - PROCEDURE LEVEL THREE: Performed by: OPHTHALMOLOGY

## 2024-07-25 PROCEDURE — 7100000010 HC PHASE TWO TIME - EACH INCREMENTAL 1 MINUTE: Performed by: OPHTHALMOLOGY

## 2024-07-25 PROCEDURE — 66825 REPOSITION INTRAOCULAR LENS: CPT | Performed by: OPHTHALMOLOGY

## 2024-07-25 PROCEDURE — 3700000002 HC GENERAL ANESTHESIA TIME - EACH INCREMENTAL 1 MINUTE: Performed by: OPHTHALMOLOGY

## 2024-07-25 PROCEDURE — 3700000001 HC GENERAL ANESTHESIA TIME - INITIAL BASE CHARGE: Performed by: OPHTHALMOLOGY

## 2024-07-25 RX ORDER — MIDAZOLAM HYDROCHLORIDE 1 MG/ML
INJECTION, SOLUTION INTRAMUSCULAR; INTRAVENOUS AS NEEDED
Status: DISCONTINUED | OUTPATIENT
Start: 2024-07-25 | End: 2024-07-25

## 2024-07-25 RX ORDER — ONDANSETRON HYDROCHLORIDE 2 MG/ML
INJECTION, SOLUTION INTRAVENOUS AS NEEDED
Status: DISCONTINUED | OUTPATIENT
Start: 2024-07-25 | End: 2024-07-25

## 2024-07-25 RX ORDER — HYDROMORPHONE HYDROCHLORIDE 1 MG/ML
INJECTION, SOLUTION INTRAMUSCULAR; INTRAVENOUS; SUBCUTANEOUS AS NEEDED
Status: DISCONTINUED | OUTPATIENT
Start: 2024-07-25 | End: 2024-07-25

## 2024-07-25 RX ORDER — MOXIFLOXACIN 5 MG/ML
SOLUTION/ DROPS OPHTHALMIC AS NEEDED
Status: DISCONTINUED | OUTPATIENT
Start: 2024-07-25 | End: 2024-07-25 | Stop reason: HOSPADM

## 2024-07-25 RX ORDER — LIDOCAINE HYDROCHLORIDE 20 MG/ML
INJECTION, SOLUTION INFILTRATION; PERINEURAL AS NEEDED
Status: DISCONTINUED | OUTPATIENT
Start: 2024-07-25 | End: 2024-07-25

## 2024-07-25 RX ORDER — CYCLOPENTOLATE HYDROCHLORIDE 10 MG/ML
1 SOLUTION/ DROPS OPHTHALMIC
Status: COMPLETED | OUTPATIENT
Start: 2024-07-25 | End: 2024-07-25

## 2024-07-25 RX ORDER — TROPICAMIDE 10 MG/ML
1 SOLUTION/ DROPS OPHTHALMIC
Status: COMPLETED | OUTPATIENT
Start: 2024-07-25 | End: 2024-07-25

## 2024-07-25 RX ORDER — TETRACAINE HYDROCHLORIDE 5 MG/ML
1 SOLUTION OPHTHALMIC ONCE
Status: COMPLETED | OUTPATIENT
Start: 2024-07-25 | End: 2024-07-25

## 2024-07-25 RX ORDER — MOXIFLOXACIN 5 MG/ML
1 SOLUTION/ DROPS OPHTHALMIC
Status: COMPLETED | OUTPATIENT
Start: 2024-07-25 | End: 2024-07-25

## 2024-07-25 RX ORDER — PREDNISOLONE ACETATE 10 MG/ML
1 SUSPENSION/ DROPS OPHTHALMIC 4 TIMES DAILY
Qty: 5 ML | Refills: 1 | Status: SHIPPED | OUTPATIENT
Start: 2024-07-25 | End: 2024-08-24

## 2024-07-25 RX ORDER — TRIAMCINOLONE ACETONIDE 40 MG/ML
INJECTION, SUSPENSION INTRA-ARTICULAR; INTRAMUSCULAR AS NEEDED
Status: DISCONTINUED | OUTPATIENT
Start: 2024-07-25 | End: 2024-07-25 | Stop reason: HOSPADM

## 2024-07-25 RX ORDER — LIDOCAINE HYDROCHLORIDE 20 MG/ML
INJECTION, SOLUTION INFILTRATION; PERINEURAL AS NEEDED
Status: DISCONTINUED | OUTPATIENT
Start: 2024-07-25 | End: 2024-07-25 | Stop reason: HOSPADM

## 2024-07-25 RX ORDER — PHENYLEPHRINE HYDROCHLORIDE 25 MG/ML
1 SOLUTION/ DROPS OPHTHALMIC
Status: COMPLETED | OUTPATIENT
Start: 2024-07-25 | End: 2024-07-25

## 2024-07-25 ASSESSMENT — SLIT LAMP EXAM - LIDS
COMMENTS: NORMAL
COMMENTS: NORMAL

## 2024-07-25 ASSESSMENT — CONF VISUAL FIELD
OD_INFERIOR_TEMPORAL_RESTRICTION: 0
OS_INFERIOR_TEMPORAL_RESTRICTION: 0
OD_INFERIOR_NASAL_RESTRICTION: 0
OD_SUPERIOR_TEMPORAL_RESTRICTION: 0
OS_INFERIOR_NASAL_RESTRICTION: 0
OD_SUPERIOR_NASAL_RESTRICTION: 0
OS_SUPERIOR_TEMPORAL_RESTRICTION: 0
OD_NORMAL: 1
OS_SUPERIOR_NASAL_RESTRICTION: 0
OS_NORMAL: 1

## 2024-07-25 ASSESSMENT — PAIN - FUNCTIONAL ASSESSMENT
PAIN_FUNCTIONAL_ASSESSMENT: 0-10

## 2024-07-25 ASSESSMENT — PAIN SCALES - GENERAL
PAIN_LEVEL: 0
PAINLEVEL_OUTOF10: 0 - NO PAIN

## 2024-07-25 ASSESSMENT — TONOMETRY
IOP_METHOD: TONOPEN APPLANATION
OD_IOP_MMHG: 15

## 2024-07-25 ASSESSMENT — COLUMBIA-SUICIDE SEVERITY RATING SCALE - C-SSRS
2. HAVE YOU ACTUALLY HAD ANY THOUGHTS OF KILLING YOURSELF?: NO
1. IN THE PAST MONTH, HAVE YOU WISHED YOU WERE DEAD OR WISHED YOU COULD GO TO SLEEP AND NOT WAKE UP?: NO
6. HAVE YOU EVER DONE ANYTHING, STARTED TO DO ANYTHING, OR PREPARED TO DO ANYTHING TO END YOUR LIFE?: NO

## 2024-07-25 ASSESSMENT — VISUAL ACUITY
OD_SC: 20/200+1
METHOD: SNELLEN - LINEAR

## 2024-07-25 ASSESSMENT — EXTERNAL EXAM - LEFT EYE: OS_EXAM: NORMAL

## 2024-07-25 ASSESSMENT — EXTERNAL EXAM - RIGHT EYE: OD_EXAM: NORMAL

## 2024-07-25 ASSESSMENT — CUP TO DISC RATIO: OD_RATIO: 0.3

## 2024-07-25 NOTE — ANESTHESIA PREPROCEDURE EVALUATION
Patient: Sally A Behanna    Procedure Information       Anesthesia Start Date/Time: 07/25/24 1135    Procedure: Exchange Intraocular Lens (Right)    Location: [facility] 02 / [facility]    Surgeons: Larry Arnold MD            Relevant Problems   Cardiac   (+) Breast pain, right   (+) Hyperlipidemia   (+) Mitral valve regurgitation   (+) Nonrheumatic aortic valve stenosis      Neuro   (+) Bilateral carotid artery stenosis   (+) Carotid artery disease (CMS-HCC)   (+) Chronic post-traumatic headache, not intractable      GI   (+) Esophageal dysphagia   (+) GERD (gastroesophageal reflux disease)      Musculoskeletal   (+) Degeneration of lumbar or lumbosacral intervertebral disc       Clinical information reviewed:   Tobacco  Allergies  Meds   Med Hx  Surg Hx   Fam Hx  Soc Hx        NPO Detail:  NPO/Void Status  Date of Last Liquid: 07/24/24  Time of Last Liquid: 2200  Date of Last Solid: 07/24/24  Time of Last Solid: 2200         Physical Exam    Airway  Mallampati: I  TM distance: >3 FB  Neck ROM: full     Cardiovascular - normal exam     Dental - normal exam     Pulmonary - normal exam     Abdominal - normal exam             Anesthesia Plan    History of general anesthesia?: yes  History of complications of general anesthesia?: no    ASA 3     MAC     intravenous induction   Anesthetic plan and risks discussed with patient.    Plan discussed with CRNA.

## 2024-07-25 NOTE — BRIEF OP NOTE
Date: 2024  OR Location: [facility] OR    Name: Sally A Behanna, : 1950, Age: 73 y.o., MRN: [id number], Sex: female    Diagnosis  Pre-op Diagnosis      * Mechanical complication due to intraocular lens implant, initial encounter [T85.29XA] Post-op Diagnosis     * Mechanical complication due to intraocular lens implant, initial encounter [T85.29XA]     Procedures  Exchange Intraocular Lens  78900 - [place] EXCHANGE INTRAOCULAR LENS      Surgeons      * Larry Arnold - Primary    Resident/Fellow/Other Assistant:  Surgeons and Role:  * No surgeons found with a matching role *    Procedure Summary  Anesthesia: Monitor Anesthesia Care  ASA: ASA status not filed in the log.  Anesthesia Staff: Anesthesiologist: Daily Mccarthy MD  CRNA: MEIR Gilliland-CRNA  Estimated Blood Loss: 0mL  Intra-op Medications:   Administrations occurring from 1000 to 1200 on 24:   Medication Name Total Dose   lidocaine (Xylocaine) 20 mg/mL (2 %) injection 1 mL   moxifloxacin (Vigamox) 0.5 % ophthalmic solution 1 drop   balanced salts (BSS) intraocular solution 500 mL   chondroitin sulf-sod hyaluron (Duovisc) intraocular kit 0.5 mL   triamcinolone acetonide (Kenalog-40) injection 40 mg   carbachol (Miostat) 0.01 % intraocular solution 0.5 mL   cyclopentolate (Cyclogyl) 1 % ophthalmic solution 1 drop 3 drop   moxifloxacin (Vigamox) 0.5 % ophthalmic solution 1 drop 3 drop   phenylephrine (Mydfrin) 2.5 % ophthalmic solution 1 drop 3 drop   tropicamide (Mydriacyl) 1 % ophthalmic solution 1 drop 3 drop              Anesthesia Record               Intraprocedure I/O Totals       None           Specimen: No specimens collected     Staff:   Circulator: Charlee  Scrub Person: Damion          Findings: Optic capture of single piece IOL with iris adhering to posterior capsule superior and inferiorly    Complications:  None; patient tolerated the procedure well.     Disposition: PACU - hemodynamically stable.  Condition: stable  Specimens  Collected: No specimens collected  Attending Attestation: I performed the procedure.    Larry Arnold  Phone Number: [phone]

## 2024-07-25 NOTE — OP NOTE
Exchange Intraocular Lens (R) Operative Note     Date: 2024  OR Location: [facility] OR    Name: Sally A Behanna, : 1950, Age: 73 y.o., MRN: [id number], Sex: female    Diagnosis  Pre-op Diagnosis      * Mechanical complication due to intraocular lens implant, initial encounter [T85.29XA] Post-op Diagnosis     * Mechanical complication due to intraocular lens implant, initial encounter [T85.29XA]     Procedures  Exchange Intraocular Lens  81781 - [place] EXCHANGE INTRAOCULAR LENS      Surgeons      * Larry Arnold - Primary    Resident/Fellow/Other Assistant:  Surgeons and Role:  * No surgeons found with a matching role *    Procedure Summary  Anesthesia: Monitor Anesthesia Care  ASA: ASA status not filed in the log.  Anesthesia Staff: Anesthesiologist: Daily Mccarthy MD  CRNA: MEIR Gilliland-CRNA  Estimated Blood Loss: 0mL  Intra-op Medications:   Administrations occurring from 1000 to 1200 on 24:   Medication Name Total Dose   lidocaine (Xylocaine) 20 mg/mL (2 %) injection 1 mL   moxifloxacin (Vigamox) 0.5 % ophthalmic solution 1 drop   balanced salts (BSS) intraocular solution 500 mL   chondroitin sulf-sod hyaluron (Duovisc) intraocular kit 0.5 mL   triamcinolone acetonide (Kenalog-40) injection 40 mg   carbachol (Miostat) 0.01 % intraocular solution 0.5 mL   cyclopentolate (Cyclogyl) 1 % ophthalmic solution 1 drop 3 drop   moxifloxacin (Vigamox) 0.5 % ophthalmic solution 1 drop 3 drop   phenylephrine (Mydfrin) 2.5 % ophthalmic solution 1 drop 3 drop   tropicamide (Mydriacyl) 1 % ophthalmic solution 1 drop 3 drop              Anesthesia Record               Intraprocedure I/O Totals       None           Specimen: No specimens collected     Staff:   Circulator: Charlee  Scrub Person: Damion         Drains and/or Catheters: * None in log *    Tourniquet Times:         Implants:     Findings: optic capture of single piece IOL with iris adhering to posterior capsule superior and  inferiorly.    Indications: Sally A Behanna is an 73 y.o. female who is having surgery for Mechanical complication due to intraocular lens implant, initial encounter [T85.29XA].     The patient was seen in the preoperative area. The risks, benefits, complications, treatment options, non-operative alternatives, expected recovery and outcomes were discussed with the patient. The possibilities of reaction to medication, pulmonary aspiration, injury to surrounding structures, bleeding, recurrent infection, the need for additional procedures, failure to diagnose a condition, and creating a complication requiring transfusion or operation were discussed with the patient. The patient concurred with the proposed plan, giving informed consent.  The site of surgery was properly noted/marked if necessary per policy. The patient has been actively warmed in preoperative area. Preoperative antibiotics have been ordered and given within 1 hours of incision. Venous thrombosis prophylaxis are not indicated.    Procedure Details: The patient was placed in the supine position on the operating room table where appropriate blood pressure and cardiac monitoring were initiated. The patient was prepped and draped in the usual sterile fashion for intraocular surgery. This included instillation of Betadine 5% onto the ocular surface followed by irrigation with balance salt solution a minute or two later. A lid speculum was placed and the operating microscope was positioned. One paracentesis stab incision was made to the left of the planned cataract incision with a 15-degree supersharp blade. 1 ml of preservative free lidocaine was injected into the AC. Viscoat was used to replace the aqueous humor. Then, using provisc, the inferior iris was released from the adhesions to the posterior capsule and brought in front of the optic. Another side port was done inferiorly and provisc and iris repositor was used to release the superior iris from the  posterior capsule. Then, miostat was injected into the anterior chamber (AC) to constrict the pupil. Residual viscoelastic was removed from the eye with the irrigation/aspiration instrument. Preservative free moxifloxacin was injected  intracameral. The wounds were checked and found to be watertight. 0.3ml of Diluted (1:3) triamcinolone acetonide was injected subonj inferiorly. The lid speculum was removed and the eye was dressed with Maxitrol ointment, eye pad, tape, and shield. The patient tolerated the procedure well, and there were no complications.   Complications:  None; patient tolerated the procedure well.    Disposition: PACU - hemodynamically stable.  Condition: stable         Additional Details: None    Attending Attestation: I performed the procedure.    Larry Arnold  Phone Number: [phone]

## 2024-07-25 NOTE — DISCHARGE INSTRUCTIONS
Please keep your eyeshield in place until your followup appointment with Dr. Arnold, this afternoon between 1:00 PM and 1:30 PM at [facility]. Please start taking prednisolone drops four times a day in the right eye - it has been sent to your pharmacy. Please do not get the eye wet. Please do not bend past the waist, strain, or lift more than 10 pounds.

## 2024-07-25 NOTE — ANESTHESIA POSTPROCEDURE EVALUATION
Patient: Sally A Behanna    Procedure Summary       Date: 07/25/24 Room / Location: [facility] 02 / [facility]    Anesthesia Start: 1135 Anesthesia Stop: 1213    Procedure: Exchange Intraocular Lens (Right) Diagnosis:       Mechanical complication due to intraocular lens implant, initial encounter      (Mechanical complication due to intraocular lens implant, initial encounter [T85.29XA])    Surgeons: Larry Arnold MD Responsible Provider: Daily Mccarthy MD    Anesthesia Type: MAC ASA Status: 3            Anesthesia Type: MAC    Vitals Value Taken Time   /64 07/25/24 1230   Temp 36.4 °C (97.5 °F) 07/25/24 1230   Pulse 67 07/25/24 1230   Resp 16 07/25/24 1230   SpO2 99 % 07/25/24 1230       Anesthesia Post Evaluation    Patient location during evaluation: PACU  Patient participation: complete - patient participated  Level of consciousness: awake  Pain score: 0  Pain management: adequate  Airway patency: patent  Cardiovascular status: acceptable  Respiratory status: acceptable  Hydration status: acceptable  Postoperative Nausea and Vomiting: none        There were no known notable events for this encounter.

## 2024-07-25 NOTE — H&P
"History Of Present Illness  Sally A Behanna is a 73 y.o. female presenting for IOL repositioning and possible exchange OD     Past Medical History  Past Medical History:   Diagnosis Date    Abnormal x-ray examination 03/27/2023    Age-related nuclear cataract of left eye 05/06/2024    Age-related nuclear cataract of 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    Allergic contact dermatitis 05/06/2024    Autoimmune disorder (Multi)     Lichen Planus    Bilateral carotid artery stenosis 09/02/2023    Carotid artery disease (CMS-[facility]) 09/02/2023    Cervical lymphadenopathy 05/06/2024    Cheilitis 05/06/2024    Contusion 05/06/2024    Cutis laxa 05/06/2024    Delayed emergence from general anesthesia     Dermatochalasis 09/02/2023    Disorder of carotid artery (CMS-[facility]) 09/02/2023    Disorder of refraction 09/02/2023    Dry eye syndrome of unspecified lacrimal gland 05/17/2017    Dry eye syndrome    Dry eyes 09/02/2023    Dyslipidemia 09/02/2023    Dysphagia 05/06/2024    Essential hypertension 05/06/2024    Fall 05/06/2024    Female pelvic pain 11/14/2013    GERD (gastroesophageal reflux disease)     Heart valve disease     mitral valve prolapse with \"some\" regurgitation    History of left cataract extraction 10/04/2023    Hollenhorst plaque, 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    Hordeolum externum unspecified eye, unspecified eyelid 05/17/2017    Stye    Hyperlipidemia     Immature cataract 02/09/2021    Impaired glucose tolerance 09/02/2023    Lichen sclerosus et atrophicus 09/02/2023    Mitral valve regurgitation 09/02/2023    Nonrheumatic aortic valve stenosis 05/06/2024    Oral lichen planus 05/06/2024    Osteopenia     Osteopenia 09/02/2023    Pain around eye 02/09/2021    Pain of right breast 09/02/2023    Pain of right upper extremity 05/06/2024    Personal history of diseases of the skin and subcutaneous tissue     History of lichen planus    Presence of intraocular lens 05/06/2024    Pseudophakia     Status post bilateral cataract extraction     " Status post left cataract extraction 10/04/2023    Status post right cataract extraction 10/04/2023    Toxic effect of venom of bees, accidental (unintentional), initial encounter 05/17/2017    Bee sting reaction    Vaginal atrophy 09/02/2023    Vitamin D deficiency 09/02/2023       Surgical History  Past Surgical History:   Procedure Laterality Date    CATARACT EXTRACTION      bilateral cataract surgery    CHOLECYSTECTOMY      COLONOSCOPY  2003    COLONOSCOPY  2012    COLONOSCOPY  2014    ESOPHAGOGASTRODUODENOSCOPY  2012    ESOPHAGOGASTRODUODENOSCOPY  2014    MOUTH SURGERY  2018    OOPHORECTOMY Right     OTHER SURGICAL HISTORY  10/24/2022    right ovary removed    OTHER SURGICAL HISTORY  10/24/2022    OTHER SURGICAL HISTORY  10/24/2022    Cardiac catheterization    OTHER SURGICAL HISTORY  10/24/2022    Cholecystectomy    OTHER SURGICAL HISTORY      teeth pulled under anesthesia    PARS PLANA VITRECTOMY Right     2023        Social History  She reports that she has quit smoking. Her smoking use included cigarettes. She has never used smokeless tobacco. She reports current alcohol use. She reports that she does not use drugs.    Family History  Family History   Problem Relation Name Age of Onset    Diabetes Mother      Heart disease Mother      Heart disease Father      No Known Problems Brother      Cancer Son      Breast cancer Mother's Sister          Allergies  Mercury; Acrylic acid; Amoxicillin-pot clavulanate; Bacitracin; Balsam peru; Ciprofloxacin; Codeine; Gold au 198; Gold salts; Iodinated contrast media; Ioversol; Latex; Menthol; Rubber, unspecified; and Sulfamethoxazole-trimethoprim    Review of Systems   All other systems reviewed and are negative.       Physical Exam    Constitutional:       General: NAD, nontoxic appearing  Cardiovascular:      Well perfused, 2+ radial pulses b/l   Pulmonary:      No increased WOB  Psychiatric:     Appropriate affect      Last Recorded Vitals  Blood pressure 174/74,  "pulse 67, temperature 36.6 °C (97.9 °F), temperature source Temporal, resp. rate 16, height 1.702 m (5' 7\"), weight 62.2 kg (137 lb 2 oz), SpO2 100%.    Relevant Results           Assessment/Plan   Principal Problem:    Mechanical complication of ocular lens prosthesis      Sally A Behanna is a 73 y.o. female presenting for IOL repositioning and possible exchange OD.           Gisel Patel MD    "

## 2024-07-25 NOTE — PROGRESS NOTES
POD 0 s/p IOL reposition OD  Surgery performed successfully with release of iris from behind IOL  Pred qid then taper  f/u 1 wk  ER precautions   PO instructions

## 2024-08-01 ENCOUNTER — HOSPITAL ENCOUNTER (OUTPATIENT)
Dept: RADIOLOGY | Facility: CLINIC | Age: 74
Discharge: HOME | End: 2024-08-01
Payer: MEDICARE

## 2024-08-01 DIAGNOSIS — M81.0 AGE-RELATED OSTEOPOROSIS WITHOUT CURRENT PATHOLOGICAL FRACTURE: ICD-10-CM

## 2024-08-01 PROCEDURE — 77080 DXA BONE DENSITY AXIAL: CPT

## 2024-08-01 PROCEDURE — 77080 DXA BONE DENSITY AXIAL: CPT | Performed by: RADIOLOGY

## 2024-08-01 ASSESSMENT — LIFESTYLE VARIABLES
3_OR_MORE_DRINKS_PER_DAY: N
CURRENT_SMOKER: N

## 2024-08-03 ENCOUNTER — APPOINTMENT (OUTPATIENT)
Dept: RADIOLOGY | Facility: HOSPITAL | Age: 74
End: 2024-08-03
Payer: MEDICARE

## 2024-08-03 ENCOUNTER — HOSPITAL ENCOUNTER (EMERGENCY)
Facility: HOSPITAL | Age: 74
Discharge: HOME | End: 2024-08-03
Attending: STUDENT IN AN ORGANIZED HEALTH CARE EDUCATION/TRAINING PROGRAM
Payer: MEDICARE

## 2024-08-03 VITALS
OXYGEN SATURATION: 96 % | DIASTOLIC BLOOD PRESSURE: 79 MMHG | RESPIRATION RATE: 15 BRPM | BODY MASS INDEX: 23.49 KG/M2 | HEART RATE: 66 BPM | WEIGHT: 149.69 LBS | TEMPERATURE: 98.4 F | HEIGHT: 67 IN | SYSTOLIC BLOOD PRESSURE: 165 MMHG

## 2024-08-03 DIAGNOSIS — S09.90XA CLOSED HEAD INJURY, INITIAL ENCOUNTER: ICD-10-CM
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
## 2024-08-03 DIAGNOSIS — M25.511 ACUTE PAIN OF RIGHT SHOULDER: ICD-10-CM

## 2024-08-03 LAB
ALBUMIN SERPL-MCNC: 4 G/DL (ref 3.5–5)
ALP BLD-CCNC: 92 U/L (ref 35–125)
ALT SERPL-CCNC: 12 U/L (ref 5–40)
ANION GAP SERPL CALC-SCNC: 8 MMOL/L
AST SERPL-CCNC: 21 U/L (ref 5–40)
BASOPHILS # BLD AUTO: 0.06 X10*3/UL (ref 0–0.1)
BASOPHILS NFR BLD AUTO: 0.8 %
BILIRUB SERPL-MCNC: 0.6 MG/DL (ref 0.1–1.2)
BUN SERPL-MCNC: 7 MG/DL (ref 8–25)
CALCIUM SERPL-MCNC: 9.4 MG/DL (ref 8.5–10.4)
CHLORIDE SERPL-SCNC: 104 MMOL/L (ref 97–107)
CO2 SERPL-SCNC: 28 MMOL/L (ref 24–31)
CREAT SERPL-MCNC: 0.7 MG/DL (ref 0.4–1.6)
EGFRCR SERPLBLD CKD-EPI 2021: 90 ML/MIN/1.73M*2
EOSINOPHIL # BLD AUTO: 0.36 X10*3/UL (ref 0–0.4)
EOSINOPHIL NFR BLD AUTO: 5 %
ERYTHROCYTE [DISTWIDTH] IN BLOOD BY AUTOMATED COUNT: 13.9 % (ref 11.5–14.5)
GLUCOSE SERPL-MCNC: 106 MG/DL (ref 65–99)
HCT VFR BLD AUTO: 38.7 % (ref 36–46)
HGB BLD-MCNC: 12.1 G/DL (ref 12–16)
IMM GRANULOCYTES # BLD AUTO: 0.02 X10*3/UL (ref 0–0.5)
IMM GRANULOCYTES NFR BLD AUTO: 0.3 % (ref 0–0.9)
LYMPHOCYTES # BLD AUTO: 1.66 X10*3/UL (ref 0.8–3)
LYMPHOCYTES NFR BLD AUTO: 22.8 %
MCH RBC QN AUTO: 27.2 PG (ref 26–34)
MCHC RBC AUTO-ENTMCNC: 31.3 G/DL (ref 32–36)
MCV RBC AUTO: 87 FL (ref 80–100)
MONOCYTES # BLD AUTO: 0.88 X10*3/UL (ref 0.05–0.8)
MONOCYTES NFR BLD AUTO: 12.1 %
NEUTROPHILS # BLD AUTO: 4.29 X10*3/UL (ref 1.6–5.5)
NEUTROPHILS NFR BLD AUTO: 59 %
NRBC BLD-RTO: 0 /100 WBCS (ref 0–0)
PLATELET # BLD AUTO: 237 X10*3/UL (ref 150–450)
POTASSIUM SERPL-SCNC: 3.4 MMOL/L (ref 3.4–5.1)
PROT SERPL-MCNC: 7.3 G/DL (ref 5.9–7.9)
RBC # BLD AUTO: 4.45 X10*6/UL (ref 4–5.2)
SODIUM SERPL-SCNC: 140 MMOL/L (ref 133–145)
WBC # BLD AUTO: 7.3 X10*3/UL (ref 4.4–11.3)

## 2024-08-03 PROCEDURE — 96374 THER/PROPH/DIAG INJ IV PUSH: CPT

## 2024-08-03 PROCEDURE — 70450 CT HEAD/BRAIN W/O DYE: CPT | Performed by: RADIOLOGY

## 2024-08-03 PROCEDURE — 80053 COMPREHEN METABOLIC PANEL: CPT | Performed by: STUDENT IN AN ORGANIZED HEALTH CARE EDUCATION/TRAINING PROGRAM

## 2024-08-03 PROCEDURE — 71250 CT THORAX DX C-: CPT | Performed by: RADIOLOGY

## 2024-08-03 PROCEDURE — 74176 CT ABD & PELVIS W/O CONTRAST: CPT | Performed by: RADIOLOGY

## 2024-08-03 PROCEDURE — 99285 EMERGENCY DEPT VISIT HI MDM: CPT | Mod: 25

## 2024-08-03 PROCEDURE — 72125 CT NECK SPINE W/O DYE: CPT | Performed by: RADIOLOGY

## 2024-08-03 PROCEDURE — 72125 CT NECK SPINE W/O DYE: CPT

## 2024-08-03 PROCEDURE — 70450 CT HEAD/BRAIN W/O DYE: CPT

## 2024-08-03 PROCEDURE — 73030 X-RAY EXAM OF SHOULDER: CPT | Performed by: RADIOLOGY

## 2024-08-03 PROCEDURE — 74176 CT ABD & PELVIS W/O CONTRAST: CPT

## 2024-08-03 PROCEDURE — 2500000004 HC RX 250 GENERAL PHARMACY W/ HCPCS (ALT 636 FOR OP/ED): Performed by: STUDENT IN AN ORGANIZED HEALTH CARE EDUCATION/TRAINING PROGRAM

## 2024-08-03 PROCEDURE — 36415 COLL VENOUS BLD VENIPUNCTURE: CPT | Performed by: STUDENT IN AN ORGANIZED HEALTH CARE EDUCATION/TRAINING PROGRAM

## 2024-08-03 PROCEDURE — 73030 X-RAY EXAM OF SHOULDER: CPT | Mod: RT

## 2024-08-03 PROCEDURE — 85025 COMPLETE CBC W/AUTO DIFF WBC: CPT | Performed by: STUDENT IN AN ORGANIZED HEALTH CARE EDUCATION/TRAINING PROGRAM

## 2024-08-03 RX ORDER — PANTOPRAZOLE SODIUM 20 MG/1
20 TABLET, DELAYED RELEASE ORAL DAILY
Qty: 20 TABLET | Refills: 0 | Status: SHIPPED | OUTPATIENT
Start: 2024-08-03 | End: 2024-08-23

## 2024-08-03 RX ORDER — KETOROLAC TROMETHAMINE 30 MG/ML
7.5 INJECTION, SOLUTION INTRAMUSCULAR; INTRAVENOUS ONCE
Status: COMPLETED | OUTPATIENT
Start: 2024-08-03 | End: 2024-08-03

## 2024-08-03 RX ORDER — NAPROXEN 500 MG/1
500 TABLET ORAL
Qty: 30 TABLET | Refills: 0 | Status: SHIPPED | OUTPATIENT
Start: 2024-08-03 | End: 2024-08-18

## 2024-08-03 RX ADMIN — KETOROLAC TROMETHAMINE 7.5 MG: 30 INJECTION, SOLUTION INTRAMUSCULAR at 06:38

## 2024-08-03 ASSESSMENT — PAIN DESCRIPTION - LOCATION: LOCATION: SHOULDER

## 2024-08-03 ASSESSMENT — COLUMBIA-SUICIDE SEVERITY RATING SCALE - C-SSRS
1. IN THE PAST MONTH, HAVE YOU WISHED YOU WERE DEAD OR WISHED YOU COULD GO TO SLEEP AND NOT WAKE UP?: NO
6. HAVE YOU EVER DONE ANYTHING, STARTED TO DO ANYTHING, OR PREPARED TO DO ANYTHING TO END YOUR LIFE?: NO
2. HAVE YOU ACTUALLY HAD ANY THOUGHTS OF KILLING YOURSELF?: NO

## 2024-08-03 ASSESSMENT — PAIN DESCRIPTION - PAIN TYPE: TYPE: ACUTE PAIN

## 2024-08-03 ASSESSMENT — PAIN DESCRIPTION - ORIENTATION: ORIENTATION: RIGHT

## 2024-08-03 ASSESSMENT — PAIN - FUNCTIONAL ASSESSMENT
PAIN_FUNCTIONAL_ASSESSMENT: 0-10
PAIN_FUNCTIONAL_ASSESSMENT: 0-10

## 2024-08-03 ASSESSMENT — PAIN SCALES - GENERAL
PAINLEVEL_OUTOF10: 7
PAINLEVEL_OUTOF10: 2

## 2024-08-03 NOTE — DISCHARGE INSTRUCTIONS
Follow-up with orthopedic surgeon by calling their office on Monday.  You can use sling as needed for comfort until following up with orthopedic surgeon.  If symptoms worsen or change she can return at any time for further evaluation.

## 2024-08-03 NOTE — ED TRIAGE NOTES
TRIAGE NOTE   I saw the patient as the Clinician in Triage and performed a brief history and physical exam, established acuity, and ordered appropriate tests to develop basic plan of care. Patient will be seen by an [facility], resident and/or physician who will independently evaluate the patient. Please see subsequent provider notes for further details and disposition.     Brief HPI:  Sally A Behanna is a 73 y.o. female that presents for pain following a fall.  She states that at around 4:00 this morning, she woke up to go to the bathroom.  When she was walking back to her bed, she slipped on the carpet.  She landed on her right side.  She now has pain in her right shoulder as well as right side of her chest.  She did hit her head.  She does not take any blood thinning medications.  She did not lose consciousness.     Focused Physical exam:   No pain with palpation of head, face, or midline cervical spine.  There is some tenderness to palpation just inferior to the right shoulder.  Motor, sensation, and circulation are intact in right arm distal portion of extremity.  No pain with palpation of abdomen, though there is some pain to palpation of the right iliac crest.  Plan/MDM:   Laboratory studies, CTs, and x-ray of right arm.    Please see subsequent provider note for further details and disposition

## 2024-08-03 NOTE — ED PROVIDER NOTES
HPI   Chief Complaint   Patient presents with    Fall    Rib Injury    Shoulder Injury    Head Injury     Patient presents for injury to right arm, shoulder, head, and rib area. Patient states she tripped over her feet and fell at 03:30. She said she hit her right shoulder and head. No LOC. Range of motion limited in right shoulder area. She is alert and oriented.        Patient is a 73-year-old female that presents emergency department for evaluation of a fall, right rib pain, right shoulder pain and head injury.  Patient reports that she had gone to the bathroom this morning and was walking back to her bedroom.  At that time her heel slipped causing her to fall onto her right side.  She did hit her head however denies loss of consciousness.  She admits to pain in her right shoulder and back ribs at this time.  Pain is worse with certain movements and she states she has difficulty lifting her right arm due to the pain.  She has no pain in the right elbow, right wrist and denies any numbness or tingling in the right upper extremity.  Patient is not on any blood thinners.  Patient does note that she was able to ambulate without difficulty when the ambulance arrived to  her house prior to arrival.      History provided by:  Patient          Patient History   Past Medical History:   Diagnosis Date    Abnormal x-ray examination 03/27/2023    Age-related nuclear cataract of left eye 05/06/2024    Age-related nuclear cataract of 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    Allergic contact dermatitis 05/06/2024    Autoimmune disorder (Multi)     Lichen Planus    Bilateral carotid artery stenosis 09/02/2023    Carotid artery disease (CMS-[facility]) 09/02/2023    Cervical lymphadenopathy 05/06/2024    Cheilitis 05/06/2024    Contusion 05/06/2024    Cutis laxa 05/06/2024    Delayed emergence from general anesthesia     Dermatochalasis 09/02/2023    Disorder of carotid artery (CMS-[facility]) 09/02/2023    Disorder of refraction 09/02/2023    Dry eye syndrome  "of unspecified lacrimal gland 05/17/2017    Dry eye syndrome    Dry eyes 09/02/2023    Dyslipidemia 09/02/2023    Dysphagia 05/06/2024    Essential hypertension 05/06/2024    Fall 05/06/2024    Female pelvic pain 11/14/2013    GERD (gastroesophageal reflux disease)     Heart valve disease     mitral valve prolapse with \"some\" regurgitation    History of left cataract extraction 10/04/2023    Hollenhorst plaque, 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    Hordeolum externum unspecified eye, unspecified eyelid 05/17/2017    Stye    Hyperlipidemia     Immature cataract 02/09/2021    Impaired glucose tolerance 09/02/2023    Lichen sclerosus et atrophicus 09/02/2023    Mitral valve regurgitation 09/02/2023    Nonrheumatic aortic valve stenosis 05/06/2024    Oral lichen planus 05/06/2024    Osteopenia     Osteopenia 09/02/2023    Pain around eye 02/09/2021    Pain of right breast 09/02/2023    Pain of right upper extremity 05/06/2024    Personal history of diseases of the skin and subcutaneous tissue     History of lichen planus    Presence of intraocular lens 05/06/2024    Pseudophakia     Status post bilateral cataract extraction     Status post left cataract extraction 10/04/2023    Status post right cataract extraction 10/04/2023    Toxic effect of venom of bees, accidental (unintentional), initial encounter 05/17/2017    Bee sting reaction    Vaginal atrophy 09/02/2023    Vitamin D deficiency 09/02/2023     Past Surgical History:   Procedure Laterality Date    CATARACT EXTRACTION      bilateral cataract surgery    CHOLECYSTECTOMY      COLONOSCOPY  2003    COLONOSCOPY  2012    COLONOSCOPY  2014    ESOPHAGOGASTRODUODENOSCOPY  2012    ESOPHAGOGASTRODUODENOSCOPY  2014    MOUTH SURGERY  2018    OOPHORECTOMY Right     OTHER SURGICAL HISTORY  10/24/2022    right ovary removed    OTHER SURGICAL HISTORY  10/24/2022    OTHER SURGICAL HISTORY  10/24/2022    Cardiac catheterization    OTHER SURGICAL HISTORY  10/24/2022    Cholecystectomy    OTHER " SURGICAL HISTORY      teeth pulled under anesthesia    PARS PLANA VITRECTOMY Right     2023     Family History   Problem Relation Name Age of Onset    Diabetes Mother      Heart disease Mother      Heart disease Father      No Known Problems Brother      Cancer Son      Breast cancer Mother's Sister       Social History     Tobacco Use    Smoking status: Former     Types: Cigarettes    Smokeless tobacco: Never   Vaping Use    Vaping status: Never Used   Substance Use Topics    Alcohol use: Yes    Drug use: Never       Physical Exam   ED Triage Vitals [08/03/24 0532]   Temperature Heart Rate Respirations BP   36.9 °C (98.4 °F) 66 15 165/79      Pulse Ox Temp Source Heart Rate Source Patient Position   96 % Oral Monitor Lying      BP Location FiO2 (%)     Left arm --       Physical Exam  Vitals and nursing note reviewed.   Constitutional:       General: She is not in acute distress.     Appearance: Normal appearance. She is not ill-appearing.   HENT:      Head: Normocephalic and atraumatic.      Mouth/Throat:      Mouth: Mucous membranes are moist.   Eyes:      Extraocular Movements: Extraocular movements intact.      Pupils: Pupils are equal, round, and reactive to light.   Cardiovascular:      Rate and Rhythm: Normal rate and regular rhythm.      Pulses:           Radial pulses are 2+ on the right side and 2+ on the left side.   Pulmonary:      Effort: Pulmonary effort is normal. No respiratory distress.      Breath sounds: No wheezing or rhonchi.   Abdominal:      General: Abdomen is flat.      Tenderness: There is abdominal tenderness (Very mild right-sided abdominal tenderness palpation without rigidity or guarding and no evidence of peritonitis). There is no guarding or rebound.   Musculoskeletal:         General: Tenderness (Tenderness palpation of the anterior right shoulder near the humeral head without obvious deformity) present. No deformity.      Comments: Patient has normal range of motion of the right  elbow and right wrist with normal strength in the right elbow and right wrist   Skin:     General: Skin is warm and dry.   Neurological:      General: No focal deficit present.      Mental Status: She is alert and oriented to person, place, and time.      Sensory: No sensory deficit.      Motor: No weakness.       Recent Results (from the past 24 hour(s))   CBC and Auto Differential    Collection Time: 08/03/24  6:02 AM   Result Value Ref Range    WBC 7.3 4.4 - 11.3 x10*3/uL    nRBC 0.0 0.0 - 0.0 /100 WBCs    RBC 4.45 4.00 - 5.20 x10*6/uL    Hemoglobin 12.1 12.0 - 16.0 g/dL    Hematocrit 38.7 36.0 - 46.0 %    MCV 87 80 - 100 fL    MCH 27.2 26.0 - 34.0 pg    MCHC 31.3 (L) 32.0 - 36.0 g/dL    RDW 13.9 11.5 - 14.5 %    Platelets 237 150 - 450 x10*3/uL    Neutrophils % 59.0 40.0 - 80.0 %    Immature Granulocytes %, Automated 0.3 0.0 - 0.9 %    Lymphocytes % 22.8 13.0 - 44.0 %    Monocytes % 12.1 2.0 - 10.0 %    Eosinophils % 5.0 0.0 - 6.0 %    Basophils % 0.8 0.0 - 2.0 %    Neutrophils Absolute 4.29 1.60 - 5.50 x10*3/uL    Immature Granulocytes Absolute, Automated 0.02 0.00 - 0.50 x10*3/uL    Lymphocytes Absolute 1.66 0.80 - 3.00 x10*3/uL    Monocytes Absolute 0.88 (H) 0.05 - 0.80 x10*3/uL    Eosinophils Absolute 0.36 0.00 - 0.40 x10*3/uL    Basophils Absolute 0.06 0.00 - 0.10 x10*3/uL   Comprehensive Metabolic Panel    Collection Time: 08/03/24  6:02 AM   Result Value Ref Range    Glucose 106 (H) 65 - 99 mg/dL    Sodium 140 133 - 145 mmol/L    Potassium 3.4 3.4 - 5.1 mmol/L    Chloride 104 97 - 107 mmol/L    Bicarbonate 28 24 - 31 mmol/L    Urea Nitrogen 7 (L) 8 - 25 mg/dL    Creatinine 0.70 0.40 - 1.60 mg/dL    eGFR 90 >60 mL/min/1.73m*2    Calcium 9.4 8.5 - 10.4 mg/dL    Albumin 4.0 3.5 - 5.0 g/dL    Alkaline Phosphatase 92 35 - 125 U/L    Total Protein 7.3 5.9 - 7.9 g/dL    AST 21 5 - 40 U/L    Bilirubin, Total 0.6 0.1 - 1.2 mg/dL    ALT 12 5 - 40 U/L    Anion Gap 8 <=19 mmol/L         ED Course & MDM   Diagnoses as  of 08/03/24 0923   Fall, initial encounter   Closed head injury, initial encounter   Acute pain of right shoulder                       [place] Coma Scale Score: 15                        Medical Decision Making  Patient is a 73-year-old female that presents emergency department for evaluation after a fall, head injury and right shoulder injury.  Patient uncomfortable appearing but in no obvious distress on initial presentation.  She does have tenderness palpation of the right shoulder without obvious deformity or obvious dislocation.  CT scan of the head, cervical spine, CT scan of the chest abdomen pelvis as well as x-ray of the right shoulder were ordered.  Patient was given IV Toradol for pain.  CT scan of the brain shows no evidence of intracranial hemorrhage.  CT scan of the chest abdomen pelvis shows no evidence of rib fractures, pneumothorax, pneumomediastinum, pulmonary contusion and no evidence of right shoulder fracture.  X-ray also shows no obvious evidence of fracture or dislocation of the right shoulder.  CT scan cervical spine shows no evidence of fracture or subluxation.  Patient was feeling slightly better on reevaluation.  She was placed in a sling for comfort as there is concern for possible ligamentous injury or possible rotator cuff injury given her pain of the right shoulder.  Sling was placed by nursing staff and patient did remain hemodynamically stable after placement of sling.  Patient be given orthopedic surgeon to follow-up with as an outpatient.  She has remained hemodynamically stable and neurovascular intact in the right upper extremity.  Return precautions were discussed with patient and family.        Procedure  Procedures     Jarret De La Rosa, DO  08/03/24 0924

## 2024-08-08 ENCOUNTER — APPOINTMENT (OUTPATIENT)
Dept: PHYSICAL THERAPY | Facility: CLINIC | Age: 74
End: 2024-08-08
Payer: COMMERCIAL

## 2024-08-08 ENCOUNTER — DOCUMENTATION (OUTPATIENT)
Dept: PHYSICAL THERAPY | Facility: CLINIC | Age: 74
End: 2024-08-08
Payer: COMMERCIAL

## 2024-08-08 NOTE — PROGRESS NOTES
Physical Therapy                 Therapy Communication Note    Patient Name: Sally A Behanna  MRN: [id number]  Today's Date: 8/8/2024     Discipline: Physical Therapy    Comment:  Patient arrives late for therapy evaluation due to power outage.  Patient reports a recent fall and plans to follow up with orthopedic Md. Also looking to follow up with ENT re: swallowing difficulties. Hoping to be treated for balance issues with that specialist. Patient instructed to follow up with [facility] if pursuing PT for possible BPPV.    Indicates understanding.

## 2024-08-09 ENCOUNTER — APPOINTMENT (OUTPATIENT)
Dept: OPHTHALMOLOGY | Facility: CLINIC | Age: 74
End: 2024-08-09
Payer: MEDICARE

## 2024-08-13 DIAGNOSIS — R42 DIZZINESS: Primary | ICD-10-CM

## 2024-08-22 ENCOUNTER — APPOINTMENT (OUTPATIENT)
Dept: OPHTHALMOLOGY | Facility: CLINIC | Age: 74
End: 2024-08-22
Payer: MEDICARE

## 2024-08-22 DIAGNOSIS — H35.341 MACULAR HOLE, RIGHT EYE: Primary | ICD-10-CM

## 2024-08-22 PROCEDURE — 92134 CPTRZ OPH DX IMG PST SGM RTA: CPT | Performed by: OPHTHALMOLOGY

## 2024-08-22 PROCEDURE — 99024 POSTOP FOLLOW-UP VISIT: CPT | Performed by: OPHTHALMOLOGY

## 2024-08-22 ASSESSMENT — ENCOUNTER SYMPTOMS
CONSTITUTIONAL NEGATIVE: 0
HEMATOLOGIC/LYMPHATIC NEGATIVE: 0
ALLERGIC/IMMUNOLOGIC NEGATIVE: 0
GASTROINTESTINAL NEGATIVE: 0
EYES NEGATIVE: 0
MUSCULOSKELETAL NEGATIVE: 0
PSYCHIATRIC NEGATIVE: 0
ENDOCRINE NEGATIVE: 0
NEUROLOGICAL NEGATIVE: 0
RESPIRATORY NEGATIVE: 0
CARDIOVASCULAR NEGATIVE: 0

## 2024-08-22 ASSESSMENT — TONOMETRY
OS_IOP_MMHG: 14
IOP_METHOD: TONOPEN
OD_IOP_MMHG: 14

## 2024-08-22 ASSESSMENT — VISUAL ACUITY
CORRECTION_TYPE: GLASSES
OS_CC: 20/25
OD_SC+: -1
OS_CC+: -3
OD_SC: 20/200
METHOD: SNELLEN - LINEAR

## 2024-08-23 ASSESSMENT — SLIT LAMP EXAM - LIDS
COMMENTS: NORMAL
COMMENTS: NORMAL

## 2024-08-23 ASSESSMENT — CUP TO DISC RATIO: OD_RATIO: 0.3

## 2024-08-23 ASSESSMENT — EXTERNAL EXAM - RIGHT EYE: OD_EXAM: NORMAL

## 2024-08-23 ASSESSMENT — EXTERNAL EXAM - LEFT EYE: OS_EXAM: NORMAL

## 2024-08-23 NOTE — PROGRESS NOTES
POW4 s/p IOL reposition OD  Surgery performed successfully with release of iris from behind IOL  Finished pred  Vision limited by retina status  Fu with Dr. Jacobs for retina eval and Dr. Ngo for routine care  PCO is not Visually significant but explained to pt YAG could be attempted too  Cornea prn

## 2024-09-05 ENCOUNTER — CLINICAL SUPPORT (OUTPATIENT)
Dept: PHYSICAL THERAPY | Facility: CLINIC | Age: 74
End: 2024-09-05
Payer: MEDICARE

## 2024-09-05 ENCOUNTER — CLINICAL SUPPORT (OUTPATIENT)
Dept: AUDIOLOGY | Facility: CLINIC | Age: 74
End: 2024-09-05
Payer: MEDICARE

## 2024-09-05 ENCOUNTER — APPOINTMENT (OUTPATIENT)
Dept: AUDIOLOGY | Facility: CLINIC | Age: 74
End: 2024-09-05
Payer: MEDICARE

## 2024-09-05 DIAGNOSIS — R42 DIZZINESS: ICD-10-CM

## 2024-09-05 DIAGNOSIS — R42 DIZZINESS: Primary | ICD-10-CM

## 2024-09-05 DIAGNOSIS — H90.3 SENSORINEURAL HEARING LOSS (SNHL) OF BOTH EARS: ICD-10-CM

## 2024-09-05 DIAGNOSIS — R27.8 SENSORY ATAXIA: Primary | ICD-10-CM

## 2024-09-05 PROCEDURE — 92557 COMPREHENSIVE HEARING TEST: CPT | Performed by: AUDIOLOGIST

## 2024-09-05 PROCEDURE — 92550 TYMPANOMETRY & REFLEX THRESH: CPT | Performed by: AUDIOLOGIST

## 2024-09-05 PROCEDURE — 97112 NEUROMUSCULAR REEDUCATION: CPT | Mod: GP

## 2024-09-05 PROCEDURE — 97162 PT EVAL MOD COMPLEX 30 MIN: CPT | Mod: GP

## 2024-09-05 NOTE — LETTER
"  AUDIOLOGY ADULT AUDIOMETRIC EVALUATION    Name:  Sally A Behanna  :  1950  Age:  73 y.o.  Date of Evaluation:  2024    Reason for visit: Denice is seen in the clinic today at the request of otolaryngology for an audiologic evaluation.     HISTORY: Patient was added on for an audiologic evaluation prior to otolaryngology follow-up with Adan Fernandez MD 9/10/24.   She was negative for BPPV today.   She was reportedly referred for Electronystagmography by neurologist, Dr. Sharp.  Patient reports when she is walking and makes a sudden, quick turn, she can feel off balanced.   She feels like she is wobbly:  \"would not pass a test if was stopped\"; staggering at times.  She denies any dizziness when going to lie down, roll in bed, tilt head.   She reports she fell about a month ago and went to the emergency room; no concussion reported; CT Scan okay.  She reports significant neck issues.   LIMITED range of motion.   Patient started physical therapy with Criss Gutierrez today.    She reports her  has noticed that her hearing is down.   She reports that his hearing is worse; he also has parkinson's.         EVALUATION  See scanned audiogram: “Media” > “Audiology Report”.      RESULTS  Otoscopic Evaluation:  Right Ear: clear ear canal  Left Ear: clear ear canal    Immittance Measures:  Tympanometry:  Right Ear: Type A, normal tympanic membrane mobility with normal middle ear pressure  Left Ear: Type A, normal tympanic membrane mobility with normal middle ear pressure    Acoustic Reflexes:  Ipsilateral Right Ear: Acoustic reflexes present within normal limits 500Hz through 2KHz; Absent 4KHz   Ipsilateral Left Ear: Acoustic reflexes present within normal limits 500Hz through 2KHz; Absent 4KHz   Contralateral Right Ear: did not evaluate  Contralateral Left Ear: did not evaluate    Distortion Product Otoacoustic Emissions (DPOAEs):  Right Ear: Refer at all frequencies 1KHz-8KHz   Left Ear: Refer at " most frequencies 1KHz-8KHz; passed only at 1500Hz    Audiometry:  Test Technique and Reliability:   Standard audiometry via supra-aural headphones. Reliability is good.    Pure tone air and bone conduction audiometry:  Mild to moderate sensorineural hearing loss bilaterally    Speech Audiometry (Word Recognition Scores):   Right Ear: Excellent at most comfortable listening level of loudness of 80dBHL  Left Ear: Excellent at most comfortable listening level of loudness of 80dBHL    IMPRESSIONS    Mild to moderate symmetrical sensorineural hearing loss bilaterally    RECOMMENDATIONS  - Follow up with otolaryngology; patient is also scheduled for Videonystagmography 9/11/24  - Annual audiologic evaluation, sooner if an acute change is noted.  - Hearing aid evaluation  - Continue with physical therapy    PATIENT EDUCATION  Discussed results, impressions and recommendations with the patient. Questions were addressed and the patient was encouraged to contact our office should concerns arise.    Time for this encounter: 787/259    Charmaine Zavaleta M.A., CCC/A   Licensed Audiologist

## 2024-09-05 NOTE — PROGRESS NOTES
"  AUDIOLOGY ADULT AUDIOMETRIC EVALUATION    Name:  Sally A Behanna  :  1950  Age:  73 y.o.  Date of Evaluation:  2024    Reason for visit: Denice is seen in the clinic today at the request of otolaryngology for an audiologic evaluation.     HISTORY: Patient was added on for an audiologic evaluation prior to otolaryngology follow-up with Adan Fernandez MD 9/10/24.   She was negative for BPPV today.   She was reportedly referred for Electronystagmography by neurologist, Dr. Sharp.  Patient reports when she is walking and makes a sudden, quick turn, she can feel off balanced.   She feels like she is wobbly:  \"would not pass a test if was stopped\"; staggering at times.  She denies any dizziness when going to lie down, roll in bed, tilt head.   She reports she fell about a month ago and went to the emergency room; no concussion reported; CT Scan okay.  She reports significant neck issues.   LIMITED range of motion.   Patient started physical therapy with Criss Gutierrez today.    She reports her  has noticed that her hearing is down.   She reports that his hearing is worse; he also has parkinson's.         EVALUATION  See scanned audiogram: “Media” > “Audiology Report”.      RESULTS  Otoscopic Evaluation:  Right Ear: clear ear canal  Left Ear: clear ear canal    Immittance Measures:  Tympanometry:  Right Ear: Type A, normal tympanic membrane mobility with normal middle ear pressure  Left Ear: Type A, normal tympanic membrane mobility with normal middle ear pressure    Acoustic Reflexes:  Ipsilateral Right Ear: Acoustic reflexes present within normal limits 500Hz through 2KHz; Absent 4KHz   Ipsilateral Left Ear: Acoustic reflexes present within normal limits 500Hz through 2KHz; Absent 4KHz   Contralateral Right Ear: did not evaluate  Contralateral Left Ear: did not evaluate    Distortion Product Otoacoustic Emissions (DPOAEs):  Right Ear: Refer at all frequencies 1KHz-8KHz   Left Ear: Refer at " most frequencies 1KHz-8KHz; passed only at 1500Hz    Audiometry:  Test Technique and Reliability:   Standard audiometry via supra-aural headphones. Reliability is good.    Pure tone air and bone conduction audiometry:  Mild to moderate sensorineural hearing loss bilaterally    Speech Audiometry (Word Recognition Scores):   Right Ear: Excellent at most comfortable listening level of loudness of 80dBHL  Left Ear: Excellent at most comfortable listening level of loudness of 80dBHL    IMPRESSIONS    Mild to moderate symmetrical sensorineural hearing loss bilaterally    RECOMMENDATIONS  - Follow up with otolaryngology; patient is also scheduled for Videonystagmography 9/11/24  - Annual audiologic evaluation, sooner if an acute change is noted.  - Hearing aid evaluation  - Continue with physical therapy    PATIENT EDUCATION  Discussed results, impressions and recommendations with the patient. Questions were addressed and the patient was encouraged to contact our office should concerns arise.    Time for this encounter: 943/829    Charmaine Zavaleta M.A., CCC/A   Licensed Audiologist

## 2024-09-05 NOTE — LETTER
"   EVALUATION OF BENIGN POSITIONAL PAROXYSMAL VERTIGO BPPV    HISTORY:  Patient was originally scheduled for audiogram and follow-up with Adan Fernandez MD; referred by neurologist Dr. Sharp.    It was then rescheduled with audiology for evaluation of BPPV only,.   Reportedly, the referral was for \"Electronystagmagraphy\"; which is \"Videonystagmography\" now that we utilize goggles and not electrodes.   Patient reports when she is walking and makes a sudden, quick turn, she can feel off balanced.   She feels like she is wobble; \"would not pass a test if was stopped\"; staggering at times.  She denies any dizziness when going to lie down, roll in bed, tilt head.   She reports she fell about a month ago and went to the emergency room; no concussion reported; CT Scan okay.  She reports significant neck issues.   LIMITED range of motion.   Patient started physical therapy with Criss Gutierrez    EVALUATION:  Carefully performed Hallpike with head hang as much as possible with restricted range of motion.    Negative for BPPV; no nystagmus; no dizziness.   Upon return to sitting position, patient reported a \"lightheaded\" wave of dizziness.      RECOMMENDATIONS:   Added on schedule to perform audiogram prior to otolaryngology follow-up 9/10/24  Per Dr. Sharp's request, scheduled Videonystagmography (9/11/24)   Patient given instructions and questionnaire; reviewed.     Confirm with Adan Fernandez MD at 9/10/24 visit if there are additional tests he would like to order.      Charmaine Zavaleta M.A., CCC/A       "

## 2024-09-06 ENCOUNTER — OFFICE VISIT (OUTPATIENT)
Dept: OPHTHALMOLOGY | Facility: CLINIC | Age: 74
End: 2024-09-06
Payer: MEDICARE

## 2024-09-06 DIAGNOSIS — G45.3 AMAUROSIS FUGAX: ICD-10-CM

## 2024-09-06 DIAGNOSIS — H04.129 DRY EYE: Primary | ICD-10-CM

## 2024-09-06 PROCEDURE — 99212 OFFICE O/P EST SF 10 MIN: CPT | Performed by: OPHTHALMOLOGY

## 2024-09-06 ASSESSMENT — VISUAL ACUITY
OD_SC+: -2
METHOD: SNELLEN - LINEAR
OS_CC: 20/25
OD_SC: 20/80
OD_PH_SC+: +1
OD_PH_SC: 20/70

## 2024-09-06 ASSESSMENT — ENCOUNTER SYMPTOMS
CONSTITUTIONAL NEGATIVE: 0
EYES NEGATIVE: 0
PSYCHIATRIC NEGATIVE: 0
GASTROINTESTINAL NEGATIVE: 0
ALLERGIC/IMMUNOLOGIC NEGATIVE: 0
ENDOCRINE NEGATIVE: 0
RESPIRATORY NEGATIVE: 0
NEUROLOGICAL NEGATIVE: 0
MUSCULOSKELETAL NEGATIVE: 0
HEMATOLOGIC/LYMPHATIC NEGATIVE: 0
CARDIOVASCULAR NEGATIVE: 0

## 2024-09-06 ASSESSMENT — EXTERNAL EXAM - LEFT EYE: OS_EXAM: NORMAL

## 2024-09-06 ASSESSMENT — SLIT LAMP EXAM - LIDS
COMMENTS: NORMAL
COMMENTS: NORMAL

## 2024-09-06 ASSESSMENT — CUP TO DISC RATIO
OD_RATIO: 0.3
OS_RATIO: 0.3

## 2024-09-06 ASSESSMENT — EXTERNAL EXAM - RIGHT EYE: OD_EXAM: NORMAL

## 2024-09-06 ASSESSMENT — PAIN SCALES - GENERAL: PAINLEVEL: 0-NO PAIN

## 2024-09-06 NOTE — ASSESSMENT & PLAN NOTE
Reinforced good lubrication and to continue even if eyes and vision doing well. Will plan a refraction in near future as has upcoming retina visit.

## 2024-09-06 NOTE — ASSESSMENT & PLAN NOTE
Still suspect vision loss more on surface spectrum left eye (OS). However, does have history of carotid occlusion. Has previous vascular surgeon on board but no treatment needed yet. If has more episodes of vision change with adequate lubrication, could consider repeat carotid ultrasound.

## 2024-09-06 NOTE — PROGRESS NOTES
Assessment/Plan   Problem List Items Addressed This Visit       Dry eye - Primary     Reinforced good lubrication and to continue even if eyes and vision doing well. Will plan a refraction in near future as has upcoming retina visit.          Amaurosis fugax     Still suspect vision loss more on surface spectrum left eye (OS). However, does have history of carotid occlusion. Has previous vascular surgeon on board but no treatment needed yet. If has more episodes of vision change with adequate lubrication, could consider repeat carotid ultrasound.             Provided reassurance regarding above diagnoses and care received in the office visit today. Discussed outcomes and options along with the importance of treatment compliance. Understands the importance of any follow up visits. Patient instructed to call/communicate with our office if any new issues, questions, or concerns.     Will plan to see back in 1 month short check and refraction or sooner PRN

## 2024-09-06 NOTE — PATIENT INSTRUCTIONS
Thank you so much for choosing me to provide your care today!    If you were dilated your vision may remain blurry   or light sensitive for several hours.    The nature of eye and vision problems can require frequent follow up, please make every effort to adhere to any future appointments.    If you have any issues, questions, or concerns,   please do not hesitate to reach out.    If you receive a survey in regards to your care today, please mention any exceptional care my office staff and/or technicians provided.    You can reach our office at this number:    [phone]    Please consider signing up for and utilizing [employer]!  This is the best way to directly reach me or other  providers

## 2024-09-07 PROBLEM — R27.8 SENSORY ATAXIA: Status: ACTIVE | Noted: 2024-09-07

## 2024-09-07 ASSESSMENT — ENCOUNTER SYMPTOMS
OCCASIONAL FEELINGS OF UNSTEADINESS: 1
DEPRESSION: 1

## 2024-09-07 ASSESSMENT — PAIN - FUNCTIONAL ASSESSMENT: PAIN_FUNCTIONAL_ASSESSMENT: 0-10

## 2024-09-07 ASSESSMENT — PATIENT HEALTH QUESTIONNAIRE - PHQ9
SUM OF ALL RESPONSES TO PHQ9 QUESTIONS 1 AND 2: 1
2. FEELING DOWN, DEPRESSED OR HOPELESS: SEVERAL DAYS
1. LITTLE INTEREST OR PLEASURE IN DOING THINGS: NOT AT ALL
10. IF YOU CHECKED OFF ANY PROBLEMS, HOW DIFFICULT HAVE THESE PROBLEMS MADE IT FOR YOU TO DO YOUR WORK, TAKE CARE OF THINGS AT HOME, OR GET ALONG WITH OTHER PEOPLE: NOT DIFFICULT AT ALL

## 2024-09-10 ENCOUNTER — TELEPHONE (OUTPATIENT)
Dept: OPHTHALMOLOGY | Facility: CLINIC | Age: 74
End: 2024-09-10

## 2024-09-10 ENCOUNTER — APPOINTMENT (OUTPATIENT)
Dept: OTOLARYNGOLOGY | Facility: CLINIC | Age: 74
End: 2024-09-10
Payer: COMMERCIAL

## 2024-09-10 VITALS — HEIGHT: 67 IN | TEMPERATURE: 96.9 F | WEIGHT: 138 LBS | BODY MASS INDEX: 21.66 KG/M2

## 2024-09-10 DIAGNOSIS — H90.3 BILATERAL SENSORINEURAL HEARING LOSS: Primary | ICD-10-CM

## 2024-09-10 DIAGNOSIS — R42 DIZZINESS AND GIDDINESS: ICD-10-CM

## 2024-09-10 PROCEDURE — 1159F MED LIST DOCD IN RCRD: CPT | Performed by: OTOLARYNGOLOGY

## 2024-09-10 PROCEDURE — 99213 OFFICE O/P EST LOW 20 MIN: CPT | Performed by: OTOLARYNGOLOGY

## 2024-09-10 PROCEDURE — 1036F TOBACCO NON-USER: CPT | Performed by: OTOLARYNGOLOGY

## 2024-09-10 NOTE — TELEPHONE ENCOUNTER
"SHELBY WAS SEEN LAST WEEK AND TOLD TO CALL IF SHE GETS \"THE FILM OVER HER EYE AGAIN\"      I asked about her using the luberication, she had only used it once today, she had appointments and wasn't able to use as directed. I ask the she use it a few time this afternoon and evening and call me tomorrow with an update.      "

## 2024-09-10 NOTE — PROGRESS NOTES
Chief Complaint   Patient presents with    Follow-up     EP 10/24/22 W/ JERMAINE- AUDIO(DONE 9/5/24) FOLLOW UP, DIZZINESS/THROAT ISSUES (pouch on esophagus doing surgery 11-24)     HPI:  Sally A Behanna is a 73 y.o. female whose had some chronic problems with dizziness and imbalance.  Does not selections having any true vertigo or spinning but more lightheaded and off-balance sensation.  Worse in areas like the grocery store if she is not using a cart.  Has some hearing loss as well.  Some bilateral ear pressure.  No pain, drainage.  She have an audiogram performed on September 5, 2024 which shows bilateral moderate symmetric sensorineural hearing loss.  She is seeing neurology and had normal MRI of the brain on May 23, 2024.  Having some trouble with vision and she may require another eye surgery.  Also going to have endoscopic repair of a Zenker's diverticulum.  She has started vestibular rehabilitation and has a VNG scheduled for tomorrow  --  MRI brain from 5/23/24.      FINDINGS:  There is no acute intracranial hemorrhage or infarct. There is no  abnormal extra-axial fluid collection or mass effect. The ventricles,  sulci, and basilar cisterns are prominent in size due to age related  diffuse cerebral volume loss.      There are multiple regions of T2 hyperintensity within the bilateral  cerebral hemispheric white matter and luis f which are probably sequela  of chronic small vessel ischemic changes.      Stable small linear focus of T2 hyperintensity within the inferior  left cerebellum is favored to reflect a prominent sulcus.      Visualized paranasal sinuses and mastoid air cells are essentially  clear.      IMPRESSION:  Findings of probable chronic small vessel ischemic changes.  PMH:  Past Medical History:   Diagnosis Date    Abnormal x-ray examination 03/27/2023    Age-related nuclear cataract of left eye 05/06/2024    Age-related nuclear cataract of 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    Allergic contact dermatitis  "05/06/2024    Autoimmune disorder (Multi)     Lichen Planus    Bilateral carotid artery stenosis 09/02/2023    Carotid artery disease (CMS-HCC) 09/02/2023    Cervical lymphadenopathy 05/06/2024    Cheilitis 05/06/2024    Contusion 05/06/2024    Cutis laxa 05/06/2024    Delayed emergence from general anesthesia     Dermatochalasis 09/02/2023    Disorder of carotid artery (CMS-HCC) 09/02/2023    Disorder of refraction 09/02/2023    Dry eye syndrome of unspecified lacrimal gland 05/17/2017    Dry eye syndrome    Dry eyes 09/02/2023    Dyslipidemia 09/02/2023    Dysphagia 05/06/2024    Essential hypertension 05/06/2024    Fall 05/06/2024    Female pelvic pain 11/14/2013    GERD (gastroesophageal reflux disease)     Heart valve disease     mitral valve prolapse with \"some\" regurgitation    History of left cataract extraction 10/04/2023    Hollenhorst plaque, 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    Hordeolum externum unspecified eye, unspecified eyelid 05/17/2017    Stye    Hyperlipidemia     Immature cataract 02/09/2021    Impaired glucose tolerance 09/02/2023    Lichen sclerosus et atrophicus 09/02/2023    Mitral valve regurgitation 09/02/2023    Nonrheumatic aortic valve stenosis 05/06/2024    Oral lichen planus 05/06/2024    Osteopenia     Osteopenia 09/02/2023    Pain around eye 02/09/2021    Pain of right breast 09/02/2023    Pain of right upper extremity 05/06/2024    Personal history of diseases of the skin and subcutaneous tissue     History of lichen planus    Presence of intraocular lens 05/06/2024    Pseudophakia     Status post bilateral cataract extraction     Status post left cataract extraction 10/04/2023    Status post right cataract extraction 10/04/2023    Toxic effect of venom of bees, accidental (unintentional), initial encounter 05/17/2017    Bee sting reaction    Vaginal atrophy 09/02/2023    Vitamin D deficiency 09/02/2023     Past Surgical History:   Procedure Laterality Date    CATARACT EXTRACTION      bilateral " "cataract surgery    CHOLECYSTECTOMY      COLONOSCOPY  2003    COLONOSCOPY  2012    COLONOSCOPY  2014    ESOPHAGOGASTRODUODENOSCOPY  2012    ESOPHAGOGASTRODUODENOSCOPY  2014    MOUTH SURGERY  2018    OOPHORECTOMY Right     OTHER SURGICAL HISTORY  10/24/2022    right ovary removed    OTHER SURGICAL HISTORY  10/24/2022    OTHER SURGICAL HISTORY  10/24/2022    Cardiac catheterization    OTHER SURGICAL HISTORY  10/24/2022    Cholecystectomy    OTHER SURGICAL HISTORY      teeth pulled under anesthesia    PARS PLANA VITRECTOMY Right     2023         Medications:     Current Outpatient Medications:     aspirin 81 mg EC tablet, Take 1 tablet (81 mg) by mouth once daily., Disp: , Rfl:     atorvastatin (Lipitor) 80 mg tablet, Take 1 tablet (80 mg) by mouth once daily., Disp: 90 tablet, Rfl: 2    biotin 1 mg capsule, Take 1 capsule (1 mg) by mouth once every 24 hours., Disp: , Rfl:     calcium carbonate-vitamin D3 600 mg-20 mcg (800 unit) tablet, Take by mouth once daily., Disp: , Rfl:     cholecalciferol (Vitamin D-3) 25 MCG (1000 UT) capsule, Take 1 capsule (25 mcg) by mouth once daily., Disp: , Rfl:     esomeprazole (NexIUM) 20 mg DR capsule, Take 2 capsules (40 mg) by mouth once daily in the morning. Take before meals., Disp: , Rfl:     propylene glycol/peg 400/PF (SYSTANE, PF, OPHT), Administer into affected eye(s)., Disp: , Rfl:     dexAMETHasone 0.5 mg/5 mL solution, SWISH AND SPIT 1/2 OZ 3 - 4 TIMES A DAY, Disp: , Rfl:     pantoprazole (ProtoNix) 20 mg EC tablet, Take 1 tablet (20 mg) by mouth once daily for 20 days. Do not crush, chew, or split., Disp: 20 tablet, Rfl: 0     Allergies:  Allergies   Allergen Reactions    Mercury Other and Unknown     \"Blister\"    Acrylic Acid Unknown     Pt doesn't remember reaction     Amoxicillin-Pot Clavulanate Nausea Only, Other and Unknown     nauseated    Bacitracin Rash    Balsam [place] Other and Unknown     Blisters on tongue    balsm of peru    Ciprofloxacin Nausea Only and " "Unknown    Codeine Other and Unknown     Pt doesn't remember reaction    feels like she is floating    Gold Au 198 Rash    Gold Salts Other     \"Redness/Erythema\"    Iodinated Contrast Media Hives    Ioversol Other    Latex Swelling and Unknown    Menthol Unknown     Pt doesn't remember reaction    Rubber, Unspecified Unknown    Sulfamethoxazole-Trimethoprim Unknown     Pt doesn't remember reaction        ROS:  Review of systems normal unless stated otherwise in the HPI and/or PMH.    Physical Exam:  Temperature 36.1 °C (96.9 °F), height 1.702 m (5' 7\"), weight 62.6 kg (138 lb). Body mass index is 21.61 kg/m².     GENERAL APPEARANCE: Well developed and well nourished.  Alert and oriented in no acute distress.  Normal vocal quality.      HEAD/FACE: No erythema or edema or facial tenderness.  Normal facial nerve function bilaterally.    EAR:       EXTERNAL: Normal pinnas and external auditory canals without lesion or obstructing wax.       MIDDLE EAR: Tympanic membranes intact and mobile with normal landmarks.  Middle ear space appears well aerated.       TUBE STATUS: N/A       MASTOID CAVITY: N/A       HEARING: Gross hearing assessment is within normal limits.      NOSE:       VISUALIZED USING: Anterior rhinoscopy with headlight and nasal speculum.       DORSUM: Midline, nontraumatic appearance.       MUCOSA: Normal-appearing.       SECRETIONS: Normal.       SEPTUM: Midline and nonobstructing.       INFERIOR TURBINATES: Normal.       MIDDLE TURBINATES/MEATUS: N/A       BLEEDING: N/A         ORAL CAVITY/PHARYNX:       TEETH: Adequate dentition.       TONGUE: No mass or lesion.  Normal mobility.       FLOOR OF MOUTH: No mass or lesion.       PALATE: Normal hard palate, soft palate, and uvula.       OROPHARYNX: Normal without mass or lesion.  Tonsils absent       BUCCAL MUCOSA/GBS: Normal without mass or lesion.       LIPS: Normal.    LARYNX/HYPOPHARYNX/NASOPHARYNX: N/A    NECK: No palpable masses or abnormal " adenopathy.  Trachea is midline.    THYROID: No thyromegaly or palpable nodule.    SALIVARY GLANDS: Normal bilateral parotid and submandibular glands by inspection and palpation.    TMJ's: Normal.    NEURO: Cranial nerve exam grossly normal bilaterally.       Assessment/Plan   Denice was seen today for follow-up.  Diagnoses and all orders for this visit:  Bilateral sensorineural hearing loss (Primary)  Dizziness and giddiness     Having some dizziness.  Likely multifactorial in nature.  I think her best course of action is to continue with her vestibular rehabilitation and aggressive balance therapy.  Get all her other medical conditions taken care of as well.  Provided some education and support.  Will do a recheck in about 6 weeks  Follow up in about 6 weeks (around 10/22/2024) for Recheck.     Adan Fernandez MD

## 2024-09-11 ENCOUNTER — APPOINTMENT (OUTPATIENT)
Dept: AUDIOLOGY | Facility: CLINIC | Age: 74
End: 2024-09-11
Payer: COMMERCIAL

## 2024-09-11 DIAGNOSIS — R42 DIZZINESS: Primary | ICD-10-CM

## 2024-09-11 PROCEDURE — 92537 CALORIC VSTBLR TEST W/REC: CPT | Performed by: AUDIOLOGIST

## 2024-09-11 PROCEDURE — 92540 BASIC VESTIBULAR EVALUATION: CPT | Performed by: AUDIOLOGIST

## 2024-09-11 ASSESSMENT — ENCOUNTER SYMPTOMS: OCCASIONAL FEELINGS OF UNSTEADINESS: 1

## 2024-09-11 ASSESSMENT — PAIN - FUNCTIONAL ASSESSMENT: PAIN_FUNCTIONAL_ASSESSMENT: 0-10

## 2024-09-11 ASSESSMENT — PAIN SCALES - GENERAL: PAINLEVEL_OUTOF10: 2

## 2024-09-11 NOTE — LETTER
"    AUDIOLOGY  VIDEONYSTAGMOGRAPHY (VNG) EVALUATION    Name:  Sally A Behanna  :  1950  Age:  73 y.o.  Date of Evaluation:  2024    Reason for visit: Ms. Behanna is seen in the clinic today at the request of Nolan Sharp MD in neurology for a videonystagmography (VNG) evaluation.    HISTORY  Patient reports periodic vertigo (spinning sensation), wobbly/floating sensation, and imbalance. Symptoms began in 2024, occur almost daily, and last from 2-12 hours before subsiding. Most recent episode occurred this morning. Dizziness/imbalance occurs randomly and is alleviated by sitting calmly; worse when walking. Patient has had a total of three falls due to her symptoms. She reports significant neck issues; limited range of motion. Denied any symptoms provoked by sneezing or coughing, as well as any presence of autophony. Denied any recent medication changes. Case history was obtained from the patient.    History of cataract surgery in both eyes, and multiple lens/retina surgeries in her right eye.    Audiogram completed 2024 revealed a mild to moderate sensorineural hearing loss with excellent word recognition ability bilaterally. Tympanometry indicated normal tympanic membrane mobility with normal middle ear pressure bilaterally.    SCREENINGS  Steadi Fall Risk  One or more falls in the last year? Yes  How many Times? 2 or more  Was the patient injured in the fall? Yes  Feels unsteady when walking? Yes  Worried about Falling? Yes    Domestic Violence Screening  Are you currently or have you recently been threatened or abused physically, emotionally, or sexually by anyone? No  Do you feel UNSAFE going back to the place you are living? No    Pain Assessment  Pain Assessment: 0-10  0-10 (Numeric) Pain Score: 2  Pain Type: Acute pain  Pain Location: Head    EVALUATION  See scanned dizziness questionnaire under “Media”  \"Visit Notes”  Document ID [id number].  See scanned results under “Media” " " \"Visit Notes”  Document ID [id number].    RESULTS  Patient reported compliance with all pre-test conditions. Ocular motor testing to visually guided targets was conducted using a dual channel video-recording technique for the recording of eye movement in the horizontal and vertical planes. Bithermal air caloric testing was performed at 48 degrees Celsius and 24 degrees Celsius.    Otoscopy: Normal. Clear ear canals with visualization of the tympanic membrane bilaterally.  Calibration: Normal. Adequate.    Random Saccades: Normal. Latencies, velocities, and accuracies are within normal limits.  Gaze: Normal. Normal gaze to left, right, up, down, and center targets.  Smooth Pursuit: Normal. Gain and symmetry are within normal limits. Test repeated for best performance.  Optokinetic (OPKs): Normal. Gain and symmetry are within normal limits.  Spontaneous Nystagmus: Normal. No spontaneous nystagmus observed/recorded in sitting or supine positions.    Charisse-Hallpike: Negative for Benign Paroxysmal Positional Vertigo (BPPV) on 09/05/2024.  Positional: Normal. No observed/recorded nystagmus in sitting, supine, head right, or head left positions.    Bithermal Caloric: Well-formed nystagmus was recorded with each irrigation.  Right warm: -30 degrees per second  Left warm: 26 degrees per second  Right cool: 13 degrees per second  Left cool: -16 degrees per second  Unilateral weakness: Normal. 1% weaker in the left ear is not significant.  Directional preponderance: Normal. 8% right beating stronger is not significant.  Fixation suppression: Normal. No failure to fixate.    NOTE: Monothermal warm caloric irrigations are indicated when all of the following criteria are met:  a) all tests of oculomotor function and positional/positioning testing are normal,  b) warm caloric irrigation SPV values exceed 11 degrees/second, and   c) interaural asymmetry values are less than 10%.     VIDEO HEAD IMPULSE TEST (vHIT): Not performed " today due to the patient's limited range of motion.    IMPRESSIONS  Normal Videonystagmography (VNG) evaluation. There is no evidence of peripheral or central vestibular system disorder. Bithermal caloric testing yielded symmetrical responses, and the directional preponderance is not significant. Oculomotor testing to visually guided signals was determined to be normal. Positional testing was determined to be normal.      RECOMMENDATIONS  - Follow up with Nolan Sharp MD in neurology. Sent results.  - Follow up with Adan Fernandez MD in otolaryngology as scheduled.  - Follow-up with medical care team as planned.     PATIENT EDUCATION  Discussed results, impressions and recommendations with the patient. Questions were addressed and the patient was encouraged to contact our office should concerns arise.     Time for this encounter: 5163-7658     Ericka Lutz, CCC-A  Licensed Audiologist

## 2024-09-11 NOTE — TELEPHONE ENCOUNTER
Denice called the Office today stating that the film over her eye cleared after using the lubricating drops. She will call back if any other issues present.

## 2024-09-11 NOTE — PROGRESS NOTES
"  AUDIOLOGY  VIDEONYSTAGMOGRAPHY (VNG) EVALUATION    Name:  Sally A Behanna  :  1950  Age:  73 y.o.  Date of Evaluation:  2024    Reason for visit: Ms. Behanna is seen in the clinic today at the request of Nolan Sharp MD in neurology for a videonystagmography (VNG) evaluation.    HISTORY  Patient reports periodic vertigo (spinning sensation), wobbly/floating sensation, and imbalance. Symptoms began in 2024, occur almost daily, and last from 2-12 hours before subsiding. Most recent episode occurred this morning. Dizziness/imbalance occurs randomly and is alleviated by sitting calmly; worse when walking. Patient has had a total of three falls due to her symptoms. She reports significant neck issues; limited range of motion. Denied any symptoms provoked by sneezing or coughing, as well as any presence of autophony. Denied any recent medication changes. Case history was obtained from the patient.    History of cataract surgery in both eyes, and multiple lens/retina surgeries in her right eye.    Audiogram completed 2024 revealed a mild to moderate sensorineural hearing loss with excellent word recognition ability bilaterally. Tympanometry indicated normal tympanic membrane mobility with normal middle ear pressure bilaterally.    SCREENINGS  Steadi Fall Risk  One or more falls in the last year? Yes  How many Times? 2 or more  Was the patient injured in the fall? Yes  Feels unsteady when walking? Yes  Worried about Falling? Yes    Domestic Violence Screening  Are you currently or have you recently been threatened or abused physically, emotionally, or sexually by anyone? No  Do you feel UNSAFE going back to the place you are living? No    Pain Assessment  Pain Assessment: 0-10  0-10 (Numeric) Pain Score: 2  Pain Type: Acute pain  Pain Location: Head    EVALUATION  See scanned dizziness questionnaire under “Media”  \"Visit Notes”  Document ID [id number].  See scanned results under “Media”  " "\"Visit Notes”  Document ID [id number].    RESULTS  Patient reported compliance with all pre-test conditions. Ocular motor testing to visually guided targets was conducted using a dual channel video-recording technique for the recording of eye movement in the horizontal and vertical planes. Bithermal air caloric testing was performed at 48 degrees Celsius and 24 degrees Celsius.    Otoscopy: Normal. Clear ear canals with visualization of the tympanic membrane bilaterally.  Calibration: Normal. Adequate.    Random Saccades: Normal. Latencies, velocities, and accuracies are within normal limits.  Gaze: Normal. Normal gaze to left, right, up, down, and center targets.  Smooth Pursuit: Normal. Gain and symmetry are within normal limits. Test repeated for best performance.  Optokinetic (OPKs): Normal. Gain and symmetry are within normal limits.  Spontaneous Nystagmus: Normal. No spontaneous nystagmus observed/recorded in sitting or supine positions.    Seagraves-Hallpike: Negative for Benign Paroxysmal Positional Vertigo (BPPV) on 09/05/2024.  Positional: Normal. No observed/recorded nystagmus in sitting, supine, head right, or head left positions.    Bithermal Caloric: Well-formed nystagmus was recorded with each irrigation.  Right warm: -30 degrees per second  Left warm: 26 degrees per second  Right cool: 13 degrees per second  Left cool: -16 degrees per second  Unilateral weakness: Normal. 1% weaker in the left ear is not significant.  Directional preponderance: Normal. 8% right beating stronger is not significant.  Fixation suppression: Normal. No failure to fixate.    NOTE: Monothermal warm caloric irrigations are indicated when all of the following criteria are met:  a) all tests of oculomotor function and positional/positioning testing are normal,  b) warm caloric irrigation SPV values exceed 11 degrees/second, and   c) interaural asymmetry values are less than 10%.     VIDEO HEAD IMPULSE TEST (vHIT): Not performed today " due to the patient's limited range of motion.    IMPRESSIONS  Normal Videonystagmography (VNG) evaluation. There is no evidence of peripheral or central vestibular system disorder. Bithermal caloric testing yielded symmetrical responses, and the directional preponderance is not significant. Oculomotor testing to visually guided signals was determined to be normal. Positional testing was determined to be normal.      RECOMMENDATIONS  - Follow up with Nolan Sharp MD in neurology. Sent results.  - Follow up with Adan Fernandez MD in otolaryngology as scheduled.  - Follow-up with medical care team as planned.     PATIENT EDUCATION  Discussed results, impressions and recommendations with the patient. Questions were addressed and the patient was encouraged to contact our office should concerns arise.     Time for this encounter: 9815-1812     Ericka Lutz, CCC-A  Licensed Audiologist

## 2024-09-17 ENCOUNTER — TREATMENT (OUTPATIENT)
Dept: PHYSICAL THERAPY | Facility: CLINIC | Age: 74
End: 2024-09-17
Payer: MEDICARE

## 2024-09-17 DIAGNOSIS — R27.8 SENSORY ATAXIA: ICD-10-CM

## 2024-09-17 PROCEDURE — 97140 MANUAL THERAPY 1/> REGIONS: CPT | Mod: GP

## 2024-09-17 ASSESSMENT — PAIN - FUNCTIONAL ASSESSMENT: PAIN_FUNCTIONAL_ASSESSMENT: 0-10

## 2024-09-17 ASSESSMENT — PAIN SCALES - GENERAL: PAINLEVEL_OUTOF10: 5 - MODERATE PAIN

## 2024-09-17 NOTE — PROGRESS NOTES
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 Expresses compliance with HEP. Did bump her R arm though so she had to reduce exercises from ortho, now resuming them again. Issues with R shoulder blade sticking out, unable to sit back against a hard chair. Making effort to correct posture. Realizing she can not bend FW for a task and return upright quickly, will get dizzy. If she bends her legs to lower herself for a task and returns upright, does not get dizzy. Making effort to focus eyes on a target with walking    Pre-Treatment Symptoms:   Pain Assessment: 0-10  0-10 (Numeric) Pain Score: 5 - Moderate pain  Pain Location: Neck (and R UE pain)    Objective   Findings:    Hypertonicity and tenderness diffusely in cervical region and R pectorals  Protracted/anterior tipped R shoulder girdle  Intermittent path deviation with gait     9/11/24 VNG IMPRESSIONS  Normal Videonystagmography (VNG) evaluation. There is no evidence of peripheral or central vestibular system disorder. Bithermal caloric testing yielded symmetrical responses, and the directional preponderance is not significant.  Oculomotor testing to visually guided signals was determined to be normal. Positional testing was determined to be normal.     Treatments:      Therapeutic Exercise:       Neuromuscular Re-Ed:   Reviewed habituation exercises      Manual Therapy:   STM cervical, SOR, manual distraction    Modalities:   Unattended e-stim: Russian: applied to R/L CPS to levator scap, Intensity to tolerance, 5 seconds on, 5 seconds off, applied for 15 minutes, in Supine , with wedge, with moist heat      Assessment:   PT Assessment  Assessment Comment: Pt presents with signs of cervicogenic dizziness. Will trial addressing cervical impairments to determine impact on symptpoms.    Post-Treatment Symptoms:   Response to Interventions: TTP with manual techniques, better after session    Plan:    Address cervical pain and ROM restriction, advance balance/mobility exercises to ability.    Goals:   Active       PT Problem       PT Goals       Start:  09/05/24    Expected End:  12/04/24       1) Indep HEP and progression.  2) Balance Master mCTSIB within age-related norms  firm EO and EC to indicate safer standing balance and decreased reliance on vision.  3) ABC Scale increased to at least 75% from eval score of 35% to indicate improved function and decreased fall risk.  4) Pt will report greater ease with navigating stairs.  5) Pt will be able to maintain path with direction changes, e.g. turning to enter a room.         Patient Stated Goal:       Start:  09/05/24    Expected End:  12/04/24       Improve balance and dizziness.

## 2024-09-19 ENCOUNTER — APPOINTMENT (OUTPATIENT)
Dept: OPHTHALMOLOGY | Facility: CLINIC | Age: 74
End: 2024-09-19
Payer: MEDICARE

## 2024-09-19 DIAGNOSIS — H35.341 MACULAR HOLE, RIGHT EYE: Primary | ICD-10-CM

## 2024-09-19 DIAGNOSIS — H43.823 VITREOMACULAR TRACTION SYNDROME OF BOTH EYES: ICD-10-CM

## 2024-09-19 DIAGNOSIS — H35.40 PERIPHERAL RETINAL THINNING: ICD-10-CM

## 2024-09-19 PROCEDURE — 92134 CPTRZ OPH DX IMG PST SGM RTA: CPT | Performed by: OPHTHALMOLOGY

## 2024-09-19 PROCEDURE — 99213 OFFICE O/P EST LOW 20 MIN: CPT | Performed by: OPHTHALMOLOGY

## 2024-09-19 ASSESSMENT — CUP TO DISC RATIO
OD_RATIO: 0.3
OS_RATIO: 0.3

## 2024-09-19 ASSESSMENT — VISUAL ACUITY
METHOD: SNELLEN - LINEAR
OD_CC: 20/70+1
CORRECTION_TYPE: GLASSES
OS_CC: 20/20

## 2024-09-19 ASSESSMENT — TONOMETRY
IOP_METHOD: GOLDMANN APPLANATION
OD_IOP_MMHG: 14
OS_IOP_MMHG: 12

## 2024-09-19 ASSESSMENT — SLIT LAMP EXAM - LIDS
COMMENTS: NORMAL
COMMENTS: NORMAL

## 2024-09-19 ASSESSMENT — EXTERNAL EXAM - RIGHT EYE: OD_EXAM: NORMAL

## 2024-09-19 ASSESSMENT — EXTERNAL EXAM - LEFT EYE: OS_EXAM: NORMAL

## 2024-09-19 NOTE — PROGRESS NOTES
Assessment/Plan   Diagnoses and all orders for this visit:  Macular hole, right eye  -     OCT, Retina - OU - Both Eyes  Vitreomacular traction syndrome of both eyes  -     OCT, Retina - OU - Both Eyes  Peripheral retinal thinning  -     OCT, Retina - OU - Both Eyes  800 micron FTMH now sealed    OCT : 09/19/24     OD: Normal Foveal Contour & Retinal laminations, EZ line preserved, (-) IRF/subretinal fluid (SRF), CST-WNL  OS: Normal Foveal Contour & Retinal laminations, EZ line preserved, (-) IRF/subretinal fluid (SRF), CST-WNL      POM#4 status post (s/p) pars plana vitrectomy (PPV) & membrane peel 10/12/23   Mac hole sealed, excellent anatomic outcome  The lesn intraocular lens (IOL) appears to have some pupillary capture  Will check undilated        Iol repositioned sees better needs glasses

## 2024-09-26 ASSESSMENT — PAIN SCALES - GENERAL: PAINLEVEL_OUTOF10: 5 - MODERATE PAIN

## 2024-09-26 ASSESSMENT — PAIN - FUNCTIONAL ASSESSMENT: PAIN_FUNCTIONAL_ASSESSMENT: 0-10

## 2024-09-27 ENCOUNTER — TREATMENT (OUTPATIENT)
Dept: PHYSICAL THERAPY | Facility: CLINIC | Age: 74
End: 2024-09-27
Payer: MEDICARE

## 2024-09-27 DIAGNOSIS — R27.8 SENSORY ATAXIA: ICD-10-CM

## 2024-09-27 PROCEDURE — 97140 MANUAL THERAPY 1/> REGIONS: CPT | Mod: GP

## 2024-09-27 PROCEDURE — 97014 ELECTRIC STIMULATION THERAPY: CPT | Mod: GP

## 2024-09-27 PROCEDURE — 97112 NEUROMUSCULAR REEDUCATION: CPT | Mod: GP

## 2024-09-27 NOTE — PROGRESS NOTES
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 Is pleased to find out she does not need additonal eye surgery. Lifted something too heavy and increased R arm pain but that had been better. Expresses compliance with HEP.    Pre-Treatment Symptoms:   Pain Assessment: 0-10  0-10 (Numeric) Pain Score: 5 - Moderate pain  Pain Location: Neck (R shoulder/arm pain as well)    Objective   Findings:    Hypertonicity and tenderness diffusely in cervical region and R pectorals  Protracted/anterior tipped R shoulder girdle  Intermittent path deviation with gait     9/11/24 VNG IMPRESSIONS  Normal Videonystagmography (VNG) evaluation. There is no evidence of peripheral or central vestibular system disorder. Bithermal caloric testing yielded symmetrical responses, and the directional preponderance is not significant. Oculomotor testing to visually guided signals was determined to be normal. Positional testing was determined to be normal.     Treatments:      Therapeutic Exercise:       Neuromuscular Re-Ed:   Gait with head turns, nods, nos; frequent cues to cont head movement  Gait with 360 turns  CW/CCW      Manual Therapy:   STM cervical, SOR, manual distraction, R pectoral STM/MFR to reduce protraction    Modalities:   Unattended e-stim: Russian: applied to R/L CPS to levator scap, Intensity to tolerance, 5 seconds on, 5 seconds off, applied for 18 minutes, in Supine , with wedge, with moist heat      Assessment:   PT Assessment  Assessment Comment: Pt continues to present with signs of cervicogenic dizziness. Will cont to trial addressing cervical impairments to determine impact on symptoms.    Post-Treatment Symptoms:   Response to Interventions: better after session    Plan:    Address cervical pain and ROM restriction, advance balance/mobility exercises to ability. Trial compliant surfaces    Goals:   Active       PT Problem       PT Goals       Start:  09/05/24    Expected End:  12/04/24       1) Indep HEP and progression.  2) Balance Master mCTSIB within age-related norms  firm EO and EC to indicate safer standing balance and decreased reliance on vision.  3) ABC Scale increased to at least 75% from eval score of 35% to indicate improved function and decreased fall risk.  4) Pt will report greater ease with navigating stairs.  5) Pt will be able to maintain path with direction changes, e.g. turning to enter a room.         Patient Stated Goal:       Start:  09/05/24    Expected End:  12/04/24       Improve balance and dizziness.

## 2024-10-02 ENCOUNTER — APPOINTMENT (OUTPATIENT)
Dept: OPHTHALMOLOGY | Facility: CLINIC | Age: 74
End: 2024-10-02
Payer: MEDICARE

## 2024-10-02 DIAGNOSIS — H52.7 REFRACTIVE ERROR: ICD-10-CM

## 2024-10-02 DIAGNOSIS — Z96.1 PSEUDOPHAKIA OF BOTH EYES: Primary | ICD-10-CM

## 2024-10-02 PROCEDURE — 99212 OFFICE O/P EST SF 10 MIN: CPT | Performed by: OPHTHALMOLOGY

## 2024-10-02 ASSESSMENT — VISUAL ACUITY
CORRECTION_TYPE: GLASSES
OD_CC: 20/70
OD_CC+: -2
OS_CC: 20/20
OS_CC+: -2
METHOD: SNELLEN - LINEAR

## 2024-10-02 ASSESSMENT — REFRACTION_WEARINGRX
OS_SPHERE: +0.00
OD_CYLINDER: -1.50
OS_CYLINDER: -1.25
OS_ADD: +2.75
OD_AXIS: 100
OD_ADD: +2.75
OD_SPHERE: +0.00
OS_AXIS: 081

## 2024-10-02 ASSESSMENT — KERATOMETRY
OD_K2POWER_DIOPTERS: 44.25
OS_AXISANGLE_DEGREES: 15
OS_AXISANGLE2_DEGREES: 105
OD_AXISANGLE2_DEGREES: 100
OD_AXISANGLE_DEGREES: 10
OS_K2POWER_DIOPTERS: 43.25
OS_K1POWER_DIOPTERS: 42.75
OD_K1POWER_DIOPTERS: 43.50

## 2024-10-02 ASSESSMENT — ENCOUNTER SYMPTOMS
RESPIRATORY NEGATIVE: 0
HEMATOLOGIC/LYMPHATIC NEGATIVE: 0
ALLERGIC/IMMUNOLOGIC NEGATIVE: 0
MUSCULOSKELETAL NEGATIVE: 0
PSYCHIATRIC NEGATIVE: 0
NEUROLOGICAL NEGATIVE: 0
CONSTITUTIONAL NEGATIVE: 0
EYES NEGATIVE: 0
GASTROINTESTINAL NEGATIVE: 0
CARDIOVASCULAR NEGATIVE: 0
ENDOCRINE NEGATIVE: 0

## 2024-10-02 ASSESSMENT — PATIENT HEALTH QUESTIONNAIRE - PHQ9
1. LITTLE INTEREST OR PLEASURE IN DOING THINGS: NOT AT ALL
SUM OF ALL RESPONSES TO PHQ9 QUESTIONS 1 AND 2: 0
2. FEELING DOWN, DEPRESSED OR HOPELESS: NOT AT ALL

## 2024-10-02 ASSESSMENT — SLIT LAMP EXAM - LIDS
COMMENTS: NORMAL
COMMENTS: NORMAL

## 2024-10-02 ASSESSMENT — REFRACTION_MANIFEST
OS_AXIS: 085
OD_SPHERE: +0.50
OD_AXIS: 100
OD_CYLINDER: -1.25
OS_CYLINDER: -1.25
OS_SPHERE: +0.50
METHOD_AUTOREFRACTION: 1

## 2024-10-02 ASSESSMENT — EXTERNAL EXAM - LEFT EYE: OS_EXAM: NORMAL

## 2024-10-02 ASSESSMENT — PAIN SCALES - GENERAL: PAINLEVEL: 0-NO PAIN

## 2024-10-02 ASSESSMENT — EXTERNAL EXAM - RIGHT EYE: OD_EXAM: NORMAL

## 2024-10-02 NOTE — PATIENT INSTRUCTIONS
Thank you so much for choosing me to provide your care today!    If you were dilated your vision may remain blurry   or light sensitive for several hours.    The nature of eye and vision problems can require frequent follow up, please make every effort to adhere to any future appointments.    If you have any issues, questions, or concerns,   please do not hesitate to reach out.    If you receive a survey in regards to your care today, please mention any exceptional care my office staff and/or technicians provided.    You can reach our office at this number:    [phone]    Please consider signing up for and utilizing [employer]!  This is the best way to directly reach me or other  providers

## 2024-10-02 NOTE — ASSESSMENT & PLAN NOTE
Suspect RX as stable as likely able to achieve. Some limitation right eye (OD) in setting of macular history. New Rx for glasses/SCL given per patient request. Patient's signature obtained to acknowledge and confirm that a paper copy of glasses/SCL Rx was given to patient in compliance with [facility] Eyeglass Rule. Electronic copy of Rx will also be available via [employer].

## 2024-10-02 NOTE — ASSESSMENT & PLAN NOTE
No signs of optic capture right eye (OD). Some PCO both eyes (OU) but non significant. Will monitor with serial exam.

## 2024-10-02 NOTE — PROGRESS NOTES
Assessment/Plan   Problem List Items Addressed This Visit       Refractive error     Suspect RX as stable as likely able to achieve. Some limitation right eye (OD) in setting of macular history. New Rx for glasses/SCL given per patient request. Patient's signature obtained to acknowledge and confirm that a paper copy of glasses/SCL Rx was given to patient in compliance with [facility] Eyeglass Rule. Electronic copy of Rx will also be available via [employer].            Pseudophakia of both eyes - Primary     No signs of optic capture right eye (OD). Some PCO both eyes (OU) but non significant. Will monitor with serial exam.             Provided reassurance regarding above diagnoses and care received in the office visit today. Discussed outcomes and options along with the importance of treatment compliance. Understands the importance of any follow up visits. Patient instructed to call/communicate with our office if any new issues, questions, or concerns.     Will plan to see back in 6 months vision check or sooner PRN

## 2024-10-07 ENCOUNTER — TREATMENT (OUTPATIENT)
Dept: PHYSICAL THERAPY | Facility: CLINIC | Age: 74
End: 2024-10-07
Payer: MEDICARE

## 2024-10-07 DIAGNOSIS — R27.8 SENSORY ATAXIA: ICD-10-CM

## 2024-10-07 PROCEDURE — 97112 NEUROMUSCULAR REEDUCATION: CPT | Mod: GP

## 2024-10-07 PROCEDURE — 97014 ELECTRIC STIMULATION THERAPY: CPT | Mod: GP

## 2024-10-07 ASSESSMENT — PAIN - FUNCTIONAL ASSESSMENT: PAIN_FUNCTIONAL_ASSESSMENT: 0-10

## 2024-10-07 ASSESSMENT — PAIN SCALES - GENERAL: PAINLEVEL_OUTOF10: 5 - MODERATE PAIN

## 2024-10-07 NOTE — PROGRESS NOTES
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 Also a lot of stress continues with family things going on. Pt reports an incident last week upon standing up from recliner, spun 3 times and braced self on spouse's chair. Was pleased should prevent a fall.    Pre-Treatment Symptoms:   Pain Assessment: 0-10  0-10 (Numeric) Pain Score: 5 - Moderate pain  Pain Location:  (some back pain and neck pain, diffuse soreness with increase in physical tasks)    Objective   Findings:    Slow sit to stand and initiating gait          Treatments:   Therapeutic Exercise:       Neuromuscular Re-Ed:   RB AP shift/static  RB ML shift/static  Foam DLS EC  Foam DLS EO weight shift  Foam DLS EO head turns  EC march in place    Deferred:  Gait with head turns, nods, nos; frequent cues to cont head movement  Gait with 360 turns CW/CCW      Manual Therapy:   Deferred: STM cervical, SOR, manual distraction, R pectoral STM/MFR to reduce protraction    Modalities:   Unattended e-stim: Russian: applied to R/L CPS to levator scap, Intensity to tolerance, 5 seconds on, 5 seconds off, applied for 15  minutes, in Supine , with wedge, with moist heat      Assessment:   PT Assessment  Assessment Comment: Pt challenged by compliant surfaces. Will cont with POC to address impairments for optimal mobility and safety.    Post-Treatment Symptoms:   Response to Interventions: better after heat/estim at start of session    Plan:    Address cervical pain and ROM restriction, advance balance/mobility exercises to ability.    Goals:   Active       PT Problem       PT Goals       Start:  09/05/24    Expected End:  12/04/24       1) Indep HEP and progression.  2) Balance Master mCTSIB within age-related norms  firm EO and EC to indicate safer standing balance and decreased reliance on vision.  3) ABC Scale increased to at least 75% from eval score of 35% to indicate improved function and decreased fall risk.  4) Pt will report greater ease with navigating stairs.  5) Pt will be able to maintain path with direction changes, e.g. turning to enter a room.         Patient Stated Goal:       Start:  09/05/24    Expected End:  12/04/24       Improve balance and dizziness.

## 2024-10-11 ENCOUNTER — TELEPHONE (OUTPATIENT)
Dept: OPHTHALMOLOGY | Facility: CLINIC | Age: 74
End: 2024-10-11
Payer: MEDICARE

## 2024-10-14 ENCOUNTER — TREATMENT (OUTPATIENT)
Dept: PHYSICAL THERAPY | Facility: CLINIC | Age: 74
End: 2024-10-14
Payer: MEDICARE

## 2024-10-14 DIAGNOSIS — R27.8 SENSORY ATAXIA: ICD-10-CM

## 2024-10-14 PROCEDURE — 97014 ELECTRIC STIMULATION THERAPY: CPT | Mod: GP

## 2024-10-14 PROCEDURE — 97112 NEUROMUSCULAR REEDUCATION: CPT | Mod: GP

## 2024-10-14 ASSESSMENT — PAIN - FUNCTIONAL ASSESSMENT: PAIN_FUNCTIONAL_ASSESSMENT: 0-10

## 2024-10-14 ASSESSMENT — PAIN SCALES - GENERAL: PAINLEVEL_OUTOF10: 5 - MODERATE PAIN

## 2024-10-14 NOTE — PROGRESS NOTES
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 R shoulder/arm was better until striking it on something. Neck and R shoulder pain were bad last night, better today.    Pre-Treatment Symptoms:   Pain Assessment: 0-10  0-10 (Numeric) Pain Score: 5 - Moderate pain    Objective   Findings:    Slow sit to stand and initiating gait          Treatments:   Therapeutic Exercise:       Neuromuscular Re-Ed:   RB AP shift/static  RB ML shift/static  Foam DLS EC  Foam DLS EO weight shift  Foam DLS EO head turns  EC march in place  Resisted gait F.T. FW 7.5# x3, BW 10# x3, R/L 5# x3    Deferred:  Gait with head turns, nods, nos; frequent cues to cont head movement  Gait with 360 turns CW/CCW      Modalities:   Unattended e-stim: Russian: applied to R/L CPS to levator scap, Intensity to tolerance, 5 seconds on, 5 seconds off, applied for 15 minutes, in Supine , with wedge, with moist heat      Manual Therapy:   Deferred: STM cervical, SOR, manual distraction, R pectoral STM/MFR to reduce protraction    Assessment:   PT Assessment  Assessment Comment: Pt cont to be challenged by compliant surfaces, montse with EC or no  target. Will cont with POC to address impairments for optimal mobility and safety.    Post-Treatment Symptoms:   Response to Interventions: better with heat/estim    Plan:    Address cervical pain and ROM restriction, advance balance/mobility exercises to ability.    Goals:   Active       PT Problem       PT Goals       Start:  09/05/24    Expected End:  12/04/24       1) Indep HEP and progression.  2) Balance Master mCTSIB within age-related norms  firm EO and EC to indicate safer standing balance and decreased reliance on vision.  3) ABC Scale increased to at least 75% from eval score of 35% to indicate improved function and decreased fall risk.  4) Pt will report greater ease with navigating stairs.  5) Pt will be able to maintain path with direction changes, e.g. turning to enter a room.         Patient Stated Goal:       Start:  09/05/24    Expected End:  12/04/24       Improve balance and dizziness.

## 2024-10-15 ENCOUNTER — TELEPHONE (OUTPATIENT)
Dept: PRIMARY CARE | Facility: CLINIC | Age: 74
End: 2024-10-15
Payer: COMMERCIAL

## 2024-10-15 NOTE — TELEPHONE ENCOUNTER
Patient states that she fell in a [employer] parking lot yesterday and hurt her foot. States that today her foot is swollen, painful and black and blue. Advised to be seen in UC due to not have opening in office. Patient verbalized understanding.

## 2024-10-17 ENCOUNTER — APPOINTMENT (OUTPATIENT)
Dept: OTOLARYNGOLOGY | Facility: CLINIC | Age: 74
End: 2024-10-17
Payer: MEDICARE

## 2024-10-17 VITALS — WEIGHT: 139 LBS | BODY MASS INDEX: 21.82 KG/M2 | HEIGHT: 67 IN | TEMPERATURE: 97.3 F

## 2024-10-17 DIAGNOSIS — R42 DIZZINESS AND GIDDINESS: ICD-10-CM

## 2024-10-17 DIAGNOSIS — H90.3 BILATERAL SENSORINEURAL HEARING LOSS: Primary | ICD-10-CM

## 2024-10-17 NOTE — PROGRESS NOTES
Chief Complaint   Patient presents with    Follow-up     LOV 9/24 F/U 6 WK CHECK UP HEARING NOT DOING ANY BETTER     HPI:  Sally A Behanna is a 73 y.o. female who presents to 6 weeks follow-up for her dizziness.   She had a normal VNG on September 11, 2024.   She has been doing balance therapy.   This has been helping.  Unfortunately 3 days ago her  who has Parkinson's disease fell down and took her with her.  She did break her toe and has some bruises on her lower extremity.   Had some chronic problems with dizziness and imbalance.  Does not selections having any true vertigo or spinning but more lightheaded and off-balance sensation.  Worse in areas like the grocery store if she is not using a cart.  Has some hearing loss as well.  Some bilateral ear pressure.  No pain, drainage.  She have an audiogram performed on September 5, 2024 which shows bilateral moderate symmetric sensorineural hearing loss.  She is seeing neurology and had normal MRI of the brain on May 23, 2024.  Having some trouble with vision and she may require another eye surgery.  Also going to have endoscopic repair of a Zenker's diverticulum.  She has started vestibular rehabilitation and has a VNG scheduled for tomorrow  --  MRI brain from 5/23/24.      FINDINGS:  There is no acute intracranial hemorrhage or infarct. There is no  abnormal extra-axial fluid collection or mass effect. The ventricles,  sulci, and basilar cisterns are prominent in size due to age related  diffuse cerebral volume loss.      There are multiple regions of T2 hyperintensity within the bilateral  cerebral hemispheric white matter and luis f which are probably sequela  of chronic small vessel ischemic changes.      Stable small linear focus of T2 hyperintensity within the inferior  left cerebellum is favored to reflect a prominent sulcus.      Visualized paranasal sinuses and mastoid air cells are essentially  clear.      IMPRESSION:  Findings of probable chronic  "small vessel ischemic changes.  PMH:  Past Medical History:   Diagnosis Date    Abnormal x-ray examination 03/27/2023    Age-related nuclear cataract of left eye 05/06/2024    Age-related nuclear cataract of 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    Allergic contact dermatitis 05/06/2024    Autoimmune disorder (Multi)     Lichen Planus    Bilateral carotid artery stenosis 09/02/2023    Carotid artery disease (CMS-HCC) 09/02/2023    Cervical lymphadenopathy 05/06/2024    Cheilitis 05/06/2024    Contusion 05/06/2024    Cutis laxa 05/06/2024    Delayed emergence from general anesthesia     Dermatochalasis 09/02/2023    Disorder of carotid artery (CMS-HCC) 09/02/2023    Disorder of refraction 09/02/2023    Dry eye syndrome of unspecified lacrimal gland 05/17/2017    Dry eye syndrome    Dry eyes 09/02/2023    Dyslipidemia 09/02/2023    Dysphagia 05/06/2024    Essential hypertension 05/06/2024    Fall 05/06/2024    Female pelvic pain 11/14/2013    GERD (gastroesophageal reflux disease)     Heart valve disease     mitral valve prolapse with \"some\" regurgitation    History of left cataract extraction 10/04/2023    Hollenhorst plaque, 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    Hordeolum externum unspecified eye, unspecified eyelid 05/17/2017    Stye    Hyperlipidemia     Immature cataract 02/09/2021    Impaired glucose tolerance 09/02/2023    Lichen sclerosus et atrophicus 09/02/2023    Mitral valve regurgitation 09/02/2023    Nonrheumatic aortic valve stenosis 05/06/2024    Oral lichen planus 05/06/2024    Osteopenia     Osteopenia 09/02/2023    Pain around eye 02/09/2021    Pain of right breast 09/02/2023    Pain of right upper extremity 05/06/2024    Personal history of diseases of the skin and subcutaneous tissue     History of lichen planus    Presence of intraocular lens 05/06/2024    Pseudophakia     Status post bilateral cataract extraction     Status post left cataract extraction 10/04/2023    Status post right cataract extraction 10/04/2023    Toxic " "effect of venom of bees, accidental (unintentional), initial encounter 05/17/2017    Bee sting reaction    Vaginal atrophy 09/02/2023    Vitamin D deficiency 09/02/2023     Past Surgical History:   Procedure Laterality Date    CATARACT EXTRACTION      bilateral cataract surgery    CHOLECYSTECTOMY      COLONOSCOPY  2003    COLONOSCOPY  2012    COLONOSCOPY  2014    ESOPHAGOGASTRODUODENOSCOPY  2012    ESOPHAGOGASTRODUODENOSCOPY  2014    MOUTH SURGERY  2018    OOPHORECTOMY Right     OTHER SURGICAL HISTORY  10/24/2022    right ovary removed    OTHER SURGICAL HISTORY  10/24/2022    OTHER SURGICAL HISTORY  10/24/2022    Cardiac catheterization    OTHER SURGICAL HISTORY  10/24/2022    Cholecystectomy    OTHER SURGICAL HISTORY      teeth pulled under anesthesia    PARS PLANA VITRECTOMY Right     2023         Medications:     Current Outpatient Medications:     aspirin 81 mg EC tablet, Take 1 tablet (81 mg) by mouth once daily., Disp: , Rfl:     atorvastatin (Lipitor) 80 mg tablet, Take 1 tablet (80 mg) by mouth once daily., Disp: 90 tablet, Rfl: 2    biotin 1 mg capsule, Take 1 capsule (1 mg) by mouth once every 24 hours., Disp: , Rfl:     calcium carbonate-vitamin D3 600 mg-20 mcg (800 unit) tablet, Take by mouth once daily., Disp: , Rfl:     cholecalciferol (Vitamin D-3) 25 MCG (1000 UT) capsule, Take 1 capsule (25 mcg) by mouth once daily., Disp: , Rfl:     dexAMETHasone 0.5 mg/5 mL solution, SWISH AND SPIT 1/2 OZ 3 - 4 TIMES A DAY, Disp: , Rfl:     esomeprazole (NexIUM) 20 mg DR capsule, Take 2 capsules (40 mg) by mouth once daily in the morning. Take before meals., Disp: , Rfl:     propylene glycol/peg 400/PF (SYSTANE, PF, OPHT), Administer into affected eye(s)., Disp: , Rfl:     pantoprazole (ProtoNix) 20 mg EC tablet, Take 1 tablet (20 mg) by mouth once daily for 20 days. Do not crush, chew, or split., Disp: 20 tablet, Rfl: 0     Allergies:  Allergies   Allergen Reactions    Mercury Other and Unknown     \"Blister\"    " "Acrylic Acid Unknown     Pt doesn't remember reaction     Amoxicillin-Pot Clavulanate Nausea Only, Other and Unknown     nauseated    Bacitracin Rash    Balsam [place] Other and Unknown     Blisters on tongue    balsm of peru    Ciprofloxacin Nausea Only and Unknown    Codeine Other and Unknown     Pt doesn't remember reaction    feels like she is floating    Gold Au 198 Rash    Gold Salts Other     \"Redness/Erythema\"    Iodinated Contrast Media Hives    Ioversol Other    Latex Swelling and Unknown    Menthol Unknown     Pt doesn't remember reaction    Rubber, Unspecified Unknown    Sulfamethoxazole-Trimethoprim Unknown     Pt doesn't remember reaction        ROS:  Review of systems normal unless stated otherwise in the HPI and/or PMH.    Physical Exam:  Temperature 36.3 °C (97.3 °F), height 1.702 m (5' 7\"), weight 63 kg (139 lb). Body mass index is 21.77 kg/m².     GENERAL APPEARANCE: Well developed and well nourished.  Alert and oriented in no acute distress.  Normal vocal quality.      HEAD/FACE: No erythema or edema or facial tenderness.  Normal facial nerve function bilaterally.    EAR:       EXTERNAL: Normal pinnas and external auditory canals without lesion or obstructing wax.       MIDDLE EAR: Tympanic membranes intact and mobile with normal landmarks.  Middle ear space appears well aerated.       TUBE STATUS: N/A       MASTOID CAVITY: N/A       HEARING: Gross hearing assessment is within normal limits.      NOSE:       VISUALIZED USING: Anterior rhinoscopy with headlight and nasal speculum.       DORSUM: Midline, nontraumatic appearance.       MUCOSA: Normal-appearing.       SECRETIONS: Normal.       SEPTUM: Midline and nonobstructing.       INFERIOR TURBINATES: Normal.       MIDDLE TURBINATES/MEATUS: N/A       BLEEDING: N/A         ORAL CAVITY/PHARYNX:       TEETH: Adequate dentition.       TONGUE: No mass or lesion.  Normal mobility.       FLOOR OF MOUTH: No mass or lesion.       PALATE: Normal hard palate, " soft palate, and uvula.       OROPHARYNX: Normal without mass or lesion.  Tonsils absent       BUCCAL MUCOSA/GBS: Normal without mass or lesion.       LIPS: Normal.    LARYNX/HYPOPHARYNX/NASOPHARYNX: N/A    NECK: No palpable masses or abnormal adenopathy.  Trachea is midline.    THYROID: No thyromegaly or palpable nodule.    SALIVARY GLANDS: Normal bilateral parotid and submandibular glands by inspection and palpation.    TMJ's: Normal.    NEURO: Cranial nerve exam grossly normal bilaterally.       Assessment/Plan   Denice was seen today for follow-up.  Diagnoses and all orders for this visit:  Bilateral sensorineural hearing loss (Primary)  Dizziness and giddiness       Having some dizziness.  Likely multifactorial in nature.  Does not appear to be peripheral vestibular otologic in nature.  I think her best chance is with continued vestibular rehabilitation and balance therapy which has been helping her significantly.  Follow up if symptoms worsen or fail to improve.     Adan Fernandez MD

## 2024-10-21 ENCOUNTER — TREATMENT (OUTPATIENT)
Dept: PHYSICAL THERAPY | Facility: CLINIC | Age: 74
End: 2024-10-21
Payer: MEDICARE

## 2024-10-21 DIAGNOSIS — R27.8 SENSORY ATAXIA: ICD-10-CM

## 2024-10-21 PROCEDURE — 97014 ELECTRIC STIMULATION THERAPY: CPT | Mod: GP

## 2024-10-21 PROCEDURE — 97140 MANUAL THERAPY 1/> REGIONS: CPT | Mod: GP

## 2024-10-21 ASSESSMENT — PAIN SCALES - GENERAL: PAINLEVEL_OUTOF10: 7

## 2024-10-21 ASSESSMENT — PAIN - FUNCTIONAL ASSESSMENT: PAIN_FUNCTIONAL_ASSESSMENT: 0-10

## 2024-10-21 NOTE — PROGRESS NOTES
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 EMS called, was in a parking lot. Busy over past weekend with family visiting, hard for her to keep up with toddler due to diffuse soreness since falling.    Pre-Treatment Symptoms:   Pain Assessment: 0-10  0-10 (Numeric) Pain Score: 7  Pain Location: Neck (diffusely since falling: shoudler, neck, knees, foot)    Objective   Findings:    Slow sit to stand and initiating gait, asymmetrical gait, decreased R stance  Increased cervical hypertonicity, TTP R CPS, scalenes          Treatments:   Therapeutic Exercise:       Neuromuscular Re-Ed:   Deferred:  RB AP shift/static  RB ML shift/static  Foam DLS EC  Foam DLS EO weight shift  Foam DLS EO head turns  EC march in place  Resisted gait F.T. FW 7.5# x3, BW 10# x3, R/L 5# x3  Gait with head turns, nods, nos; frequent cues to cont head movement  Gait with 360 turns CW/CCW      Modalities:   Unattended e-stim: Russian: applied to R/L CPS to levator scap, Intensity to tolerance, 5 seconds on, 5 seconds off, applied for 20 minutes, in Supine , with  wedge, with moist heat      Manual Therapy:   STM cervical, SOR, manual distraction, R pectoral STM/MFR to reduce protraction    Assessment:   PT Assessment  Assessment Comment: Limited session today due to pain exacerbation from falling upon assisting spouse that was falling. 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 Reminded pt of [employer] as a resource for herself.    Post-Treatment Symptoms:   Response to Interventions: better in neck after heat/estim    Plan:    Address cervical pain and ROM restriction, advance balance/mobility exercises to ability as pain level allows.    Goals:   Active       PT Problem       PT Goals       Start:  09/05/24    Expected End:  12/04/24       1) Indep HEP and progression.  2) Balance Master mCTSIB within age-related norms  firm EO and EC to indicate safer standing balance and decreased reliance on vision.  3) ABC Scale increased to at least 75% from eval score of 35% to indicate improved function and decreased fall risk.  4) Pt will report greater ease with navigating stairs.  5) Pt will be able to maintain path with direction changes, e.g. turning to enter a room.         Patient Stated Goal:       Start:  09/05/24    Expected End:  12/04/24       Improve balance and dizziness.

## 2024-10-28 ENCOUNTER — TREATMENT (OUTPATIENT)
Dept: PHYSICAL THERAPY | Facility: CLINIC | Age: 74
End: 2024-10-28
Payer: MEDICARE

## 2024-10-28 DIAGNOSIS — R27.8 SENSORY ATAXIA: ICD-10-CM

## 2024-10-28 PROCEDURE — 97112 NEUROMUSCULAR REEDUCATION: CPT | Mod: GP

## 2024-10-28 PROCEDURE — 97014 ELECTRIC STIMULATION THERAPY: CPT | Mod: GP

## 2024-10-28 ASSESSMENT — PAIN SCALES - GENERAL: PAINLEVEL_OUTOF10: 6

## 2024-10-28 ASSESSMENT — PAIN - FUNCTIONAL ASSESSMENT: PAIN_FUNCTIONAL_ASSESSMENT: 0-10

## 2024-10-30 ENCOUNTER — TELEPHONE (OUTPATIENT)
Dept: OPHTHALMOLOGY | Facility: CLINIC | Age: 74
End: 2024-10-30
Payer: MEDICARE

## 2024-11-04 ENCOUNTER — ANESTHESIA (OUTPATIENT)
Dept: GASTROENTEROLOGY | Facility: HOSPITAL | Age: 74
End: 2024-11-04
Payer: MEDICARE

## 2024-11-04 ENCOUNTER — ANESTHESIA EVENT (OUTPATIENT)
Dept: GASTROENTEROLOGY | Facility: HOSPITAL | Age: 74
End: 2024-11-04
Payer: MEDICARE

## 2024-11-04 ENCOUNTER — HOSPITAL ENCOUNTER (OUTPATIENT)
Dept: GASTROENTEROLOGY | Facility: HOSPITAL | Age: 74
Discharge: HOME | End: 2024-11-04
Payer: MEDICARE

## 2024-11-04 VITALS
BODY MASS INDEX: 21.45 KG/M2 | RESPIRATION RATE: 16 BRPM | SYSTOLIC BLOOD PRESSURE: 142 MMHG | HEIGHT: 67 IN | TEMPERATURE: 98.4 F | HEART RATE: 66 BPM | WEIGHT: 136.69 LBS | DIASTOLIC BLOOD PRESSURE: 56 MMHG | OXYGEN SATURATION: 99 %

## 2024-11-04 DIAGNOSIS — K22.5 ZENKER DIVERTICULUM: ICD-10-CM

## 2024-11-04 DIAGNOSIS — R13.19 ESOPHAGEAL DYSPHAGIA: ICD-10-CM

## 2024-11-04 PROCEDURE — 99100 ANES PT EXTEME AGE<1 YR&>70: CPT | Performed by: ANESTHESIOLOGY

## 2024-11-04 PROCEDURE — A43235 PR ESOPHAGOGASTRODUODENOSCOPY TRANSORAL DIAGNOSTIC: Performed by: NURSE ANESTHETIST, CERTIFIED REGISTERED

## 2024-11-04 PROCEDURE — 2720000007 HC OR 272 NO HCPCS

## 2024-11-04 PROCEDURE — 3700000001 HC GENERAL ANESTHESIA TIME - INITIAL BASE CHARGE

## 2024-11-04 PROCEDURE — 7100000009 HC PHASE TWO TIME - INITIAL BASE CHARGE

## 2024-11-04 PROCEDURE — 7100000001 HC RECOVERY ROOM TIME - INITIAL BASE CHARGE

## 2024-11-04 PROCEDURE — 2500000004 HC RX 250 GENERAL PHARMACY W/ HCPCS (ALT 636 FOR OP/ED): Performed by: NURSE ANESTHETIST, CERTIFIED REGISTERED

## 2024-11-04 PROCEDURE — 43235 EGD DIAGNOSTIC BRUSH WASH: CPT | Performed by: INTERNAL MEDICINE

## 2024-11-04 PROCEDURE — 3700000002 HC GENERAL ANESTHESIA TIME - EACH INCREMENTAL 1 MINUTE

## 2024-11-04 PROCEDURE — 43497 TRANSORL LWR ESOPHGL MYOTOMY: CPT | Performed by: INTERNAL MEDICINE

## 2024-11-04 PROCEDURE — 7100000002 HC RECOVERY ROOM TIME - EACH INCREMENTAL 1 MINUTE

## 2024-11-04 PROCEDURE — 7100000010 HC PHASE TWO TIME - EACH INCREMENTAL 1 MINUTE

## 2024-11-04 PROCEDURE — A43235 PR ESOPHAGOGASTRODUODENOSCOPY TRANSORAL DIAGNOSTIC: Performed by: ANESTHESIOLOGY

## 2024-11-04 RX ORDER — CEFAZOLIN 1 G/1
INJECTION, POWDER, FOR SOLUTION INTRAVENOUS AS NEEDED
Status: DISCONTINUED | OUTPATIENT
Start: 2024-11-04 | End: 2024-11-04

## 2024-11-04 RX ORDER — ROCURONIUM BROMIDE 10 MG/ML
INJECTION, SOLUTION INTRAVENOUS AS NEEDED
Status: DISCONTINUED | OUTPATIENT
Start: 2024-11-04 | End: 2024-11-04

## 2024-11-04 RX ORDER — FENTANYL CITRATE 50 UG/ML
INJECTION, SOLUTION INTRAMUSCULAR; INTRAVENOUS AS NEEDED
Status: DISCONTINUED | OUTPATIENT
Start: 2024-11-04 | End: 2024-11-04

## 2024-11-04 RX ORDER — LIDOCAINE HYDROCHLORIDE 20 MG/ML
INJECTION, SOLUTION EPIDURAL; INFILTRATION; INTRACAUDAL; PERINEURAL AS NEEDED
Status: DISCONTINUED | OUTPATIENT
Start: 2024-11-04 | End: 2024-11-04

## 2024-11-04 RX ORDER — SODIUM CHLORIDE, SODIUM LACTATE, POTASSIUM CHLORIDE, CALCIUM CHLORIDE 600; 310; 30; 20 MG/100ML; MG/100ML; MG/100ML; MG/100ML
INJECTION, SOLUTION INTRAVENOUS CONTINUOUS PRN
Status: DISCONTINUED | OUTPATIENT
Start: 2024-11-04 | End: 2024-11-04

## 2024-11-04 RX ORDER — SUCCINYLCHOLINE CHLORIDE 20 MG/ML
INJECTION INTRAMUSCULAR; INTRAVENOUS AS NEEDED
Status: DISCONTINUED | OUTPATIENT
Start: 2024-11-04 | End: 2024-11-04

## 2024-11-04 RX ORDER — PROPOFOL 10 MG/ML
INJECTION, EMULSION INTRAVENOUS AS NEEDED
Status: DISCONTINUED | OUTPATIENT
Start: 2024-11-04 | End: 2024-11-04

## 2024-11-04 RX ORDER — ONDANSETRON HYDROCHLORIDE 2 MG/ML
INJECTION, SOLUTION INTRAVENOUS AS NEEDED
Status: DISCONTINUED | OUTPATIENT
Start: 2024-11-04 | End: 2024-11-04

## 2024-11-04 RX ORDER — MIDAZOLAM HYDROCHLORIDE 1 MG/ML
INJECTION INTRAMUSCULAR; INTRAVENOUS AS NEEDED
Status: DISCONTINUED | OUTPATIENT
Start: 2024-11-04 | End: 2024-11-04

## 2024-11-04 SDOH — HEALTH STABILITY: MENTAL HEALTH: CURRENT SMOKER: 0

## 2024-11-04 ASSESSMENT — PAIN - FUNCTIONAL ASSESSMENT
PAIN_FUNCTIONAL_ASSESSMENT: 0-10
PAIN_FUNCTIONAL_ASSESSMENT: UNABLE TO SELF-REPORT
PAIN_FUNCTIONAL_ASSESSMENT: 0-10

## 2024-11-04 ASSESSMENT — PAIN SCALES - GENERAL
PAINLEVEL_OUTOF10: 0 - NO PAIN

## 2024-11-04 ASSESSMENT — ENCOUNTER SYMPTOMS: UNEXPECTED WEIGHT CHANGE: 1

## 2024-11-04 NOTE — ANESTHESIA POSTPROCEDURE EVALUATION
Patient: Sally A Behanna    Procedure Summary       Date: 11/04/24 Room / Location: [facility]    Anesthesia Start: 1331 Anesthesia Stop: 1425    Procedure: EGD Diagnosis:       Zenker diverticulum      Esophageal dysphagia    Scheduled Providers: Gabino Freedman DO; Hardeep Good MD Responsible Provider: Fabio Moya MD    Anesthesia Type: MAC ASA Status: 2            Anesthesia Type: MAC    Vitals Value Taken Time   /91 11/04/24 1546   Temp 36.4 °C (97.5 °F) 11/04/24 1515   Pulse 63 11/04/24 1548   Resp 14 11/04/24 1515   SpO2 97 % 11/04/24 1548   Vitals shown include unfiled device data.    Anesthesia Post Evaluation    Patient location during evaluation: bedside  Patient participation: complete - patient participated  Level of consciousness: awake  Pain management: adequate  Multimodal analgesia pain management approach  Airway patency: patent  Cardiovascular status: stable  Respiratory status: spontaneous ventilation and unassisted  Hydration status: acceptable  Postoperative Nausea and Vomiting: none  Comments: No significant PONV.        No notable events documented.

## 2024-11-04 NOTE — H&P
"History Of Present Illness  Sally A Behanna is a 73 y.o. female presenting with a symptomatic Zenker's diverticulum presents for endoscopic therapy. She was seen this past summer and asked to think about her options.  She has had progressive dysphagia especially with solid food to the point of worrying about aspiration due to choking as recent as this past week.  She has family support for extended recovery or home going care based on her symptoms after the myotomy.     Past Medical History  Past Medical History:   Diagnosis Date    Abnormal x-ray examination 03/27/2023    Age-related nuclear cataract of left eye 05/06/2024    Age-related nuclear cataract of 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    Allergic contact dermatitis 05/06/2024    Autoimmune disorder (Multi)     Lichen Planus    Bilateral carotid artery stenosis 09/02/2023    Carotid artery disease (CMS-HCC) 09/02/2023    Cervical lymphadenopathy 05/06/2024    Cheilitis 05/06/2024    Contusion 05/06/2024    Cutis laxa 05/06/2024    Delayed emergence from general anesthesia     Dermatochalasis 09/02/2023    Disorder of carotid artery (CMS-[facility]) 09/02/2023    Disorder of refraction 09/02/2023    Dry eye syndrome of unspecified lacrimal gland 05/17/2017    Dry eye syndrome    Dry eyes 09/02/2023    Dyslipidemia 09/02/2023    Dysphagia 05/06/2024    Essential hypertension 05/06/2024    Fall 05/06/2024    Female pelvic pain 11/14/2013    GERD (gastroesophageal reflux disease)     Heart valve disease     mitral valve prolapse with \"some\" regurgitation    History of left cataract extraction 10/04/2023    Hollenhorst plaque, 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    Hordeolum externum unspecified eye, unspecified eyelid 05/17/2017    Stye    Hyperlipidemia     Immature cataract 02/09/2021    Impaired glucose tolerance 09/02/2023    Lichen sclerosus et atrophicus 09/02/2023    Mitral valve regurgitation 09/02/2023    Nonrheumatic aortic valve stenosis 05/06/2024    Oral lichen planus 05/06/2024    " "Osteopenia     Osteopenia 09/02/2023    Pain around eye 02/09/2021    Pain of right breast 09/02/2023    Pain of right upper extremity 05/06/2024    Personal history of diseases of the skin and subcutaneous tissue     History of lichen planus    Presence of intraocular lens 05/06/2024    Pseudophakia     Status post bilateral cataract extraction     Status post left cataract extraction 10/04/2023    Status post right cataract extraction 10/04/2023    Toxic effect of venom of bees, accidental (unintentional), initial encounter 05/17/2017    Bee sting reaction    Vaginal atrophy 09/02/2023    Vitamin D deficiency 09/02/2023     Surgical History  Past Surgical History:   Procedure Laterality Date    CATARACT EXTRACTION      bilateral cataract surgery    CHOLECYSTECTOMY      COLONOSCOPY  2003    COLONOSCOPY  2012    COLONOSCOPY  2014    ESOPHAGOGASTRODUODENOSCOPY  2012    ESOPHAGOGASTRODUODENOSCOPY  2014    MOUTH SURGERY  2018    OOPHORECTOMY Right     OTHER SURGICAL HISTORY  10/24/2022    right ovary removed    OTHER SURGICAL HISTORY  10/24/2022    OTHER SURGICAL HISTORY  10/24/2022    Cardiac catheterization    OTHER SURGICAL HISTORY  10/24/2022    Cholecystectomy    OTHER SURGICAL HISTORY      teeth pulled under anesthesia    PARS PLANA VITRECTOMY Right     2023     Social History  She reports that she has quit smoking. Her smoking use included cigarettes. She has never used smokeless tobacco. She reports current alcohol use. She reports that she does not use drugs.    Family History  Family History   Problem Relation Name Age of Onset    Diabetes Mother      Heart disease Mother      Heart disease Father      No Known Problems Brother      Cancer Son      Breast cancer Mother's Sister          Allergies  Allergies   Allergen Reactions    Mercury Other and Unknown     \"Blister\"    Acrylic Acid Unknown     Pt doesn't remember reaction     Amoxicillin-Pot Clavulanate Nausea Only, Other and Unknown     nauseated    " "Bacitracin Rash    Balsam [place] Other and Unknown     Blisters on tongue    balsm of peru    Ciprofloxacin Nausea Only and Unknown    Codeine Other and Unknown     Pt doesn't remember reaction    feels like she is floating    Gold Au 198 Rash    Gold Salts Other     \"Redness/Erythema\"    Iodinated Contrast Media Hives    Ioversol Other    Latex Swelling and Unknown    Menthol Unknown     Pt doesn't remember reaction    Rubber, Unspecified Unknown    Sulfamethoxazole-Trimethoprim Unknown     Pt doesn't remember reaction     Review of Systems   Constitutional:  Positive for unexpected weight change.   All other systems reviewed and are negative.       Physical Exam  Constitutional:       Appearance: Normal appearance.   Cardiovascular:      Rate and Rhythm: Normal rate and regular rhythm.   Pulmonary:      Effort: Pulmonary effort is normal.      Breath sounds: Normal breath sounds.   Abdominal:      General: Abdomen is flat.      Palpations: Abdomen is soft.   Neurological:      Mental Status: She is alert.   Psychiatric:         Mood and Affect: Mood normal.         Behavior: Behavior normal.         Thought Content: Thought content normal.         Judgment: Judgment normal.          Last Recorded Vitals  Blood pressure 155/58, pulse 63, temperature 36.3 °C (97.3 °F), temperature source Temporal, resp. rate 16, height 1.702 m (5' 7\"), weight 62 kg (136 lb 11 oz), SpO2 99%.    Assessment/Plan   EGD with endoscopic myotomy as planned     Gabino Freedman, DO  "

## 2024-11-04 NOTE — ANESTHESIA PROCEDURE NOTES
Airway  Date/Time: 11/4/2024 1:42 PM  Urgency: elective    Airway not difficult    Staffing  Performed: CRNA   Authorized by: Hardeep Good MD    Performed by: MEIR Bernard-MILI  Patient location during procedure: OR    Indications and Patient Condition  Indications for airway management: anesthesia and airway protection  Spontaneous ventilation: present  Sedation level: deep  Preoxygenated: yes  Patient position: sniffing  MILS maintained throughout  Mask difficulty assessment: 0 - not attempted    Final Airway Details  Final airway type: endotracheal airway      Successful airway: ETT  Cuffed: yes   Successful intubation technique: direct laryngoscopy  Facilitating devices/methods: intubating stylet and cricoid pressure  Endotracheal tube insertion site: oral  Blade: Stan  Blade size: #3  ETT size (mm): 7.0  Cormack-Lehane Classification: grade I - full view of glottis  Placement verified by: chest auscultation and capnometry   Cuff volume (mL): 6  Measured from: lips  ETT to lips (cm): 21  Number of attempts at approach: 1  Ventilation between attempts: BVM    Additional Comments  Preoxygenated 100% FiO2/Reverse trendelenberg/cricoid pressure applied.  Smooth rapid sequence induction, atraumatic DVL/intubation, edentulous/lips intact.

## 2024-11-04 NOTE — ANESTHESIA PREPROCEDURE EVALUATION
Patient: Sally A Behanna    Procedure Information       Date/Time: 11/04/24 1300    Scheduled providers: Gabino Freedman DO; Hardeep Good MD    Procedure: EGD    Location: [facility]            Relevant Problems   Cardiac   (+) Breast pain, right   (+) Hyperlipidemia   (+) Mitral valve regurgitation   (+) Nonrheumatic aortic valve stenosis      Neuro   (+) Amaurosis fugax   (+) Bilateral carotid artery stenosis   (+) Carotid artery disease (CMS-HCC)   (+) Chronic post-traumatic headache, not intractable      GI   (+) Esophageal dysphagia   (+) GERD (gastroesophageal reflux disease)      Musculoskeletal   (+) Degeneration of lumbar or lumbosacral intervertebral disc       Clinical information reviewed:   Tobacco  Allergies  Meds   Med Hx  Surg Hx  OB Status  Fam Hx  Soc   Hx        NPO Detail:  NPO/Void Status  Carbohydrate Drink Given Prior to Surgery? : N  Date of Last Liquid: 11/04/24  Time of Last Liquid: 0900  Date of Last Solid: 11/03/24  Time of Last Solid: 1930  Last Intake Type: Clear fluids  Time of Last Void: 1100         Physical Exam    Airway  Mallampati: I  TM distance: >3 FB  Neck ROM: full     Cardiovascular - normal exam     Dental - normal exam     Pulmonary - normal exam     Abdominal - normal exam         Anesthesia Plan    History of general anesthesia?: yes  History of complications of general anesthesia?: no    ASA 2     MAC     The patient is not a current smoker.  Patient was not previously instructed to abstain from smoking on day of procedure.  Patient did not smoke on day of procedure.    intravenous induction   Postoperative administration of opioids is intended.  Anesthetic plan and risks discussed with patient.  Use of blood products discussed with patient who.    Plan discussed with CRNA.

## 2024-11-04 NOTE — DISCHARGE INSTRUCTIONS
Patient Instructions after an endoscopy or colonoscopy      The anesthetics, sedatives or narcotics which were given to you today will be acting in your body for the next 24 hours, so you might feel a little sleepy or groggy.  This feeling should slowly wear off. Carefully read and follow the instructions.     You received sedation today:  - Do not drive or operate any machinery or power tools of any kind.   - No alcoholic beverages today, not even beer or wine.  - Do not make any important decisions or sign any legal documents.  - No over the counter medications that contain alcohol or that may cause drowsiness.  - Do not make any important decisions or sign any legal documents.    While it is common to experience mild to moderate abdominal distention, gas, or belching after your procedure, if any of these symptoms occur following discharge from the GI Lab or within one week of having your procedure, call the [facility] to be advised whether a visit to your nearest Urgent Care or Emergency Department is indicated.  Take this paper with you if you go.     - If you develop an allergic reaction to the medications that were given during your procedure such as difficulty breathing, rash, hives, severe nausea, vomiting or lightheadedness.- If you experience chest pain, shortness of breath, severe abdominal pain, fevers and chills.    -If you develop signs and symptoms of bleeding such as blood in your spit, if your stools turn black, tarry, or bloody    - If you have not urinated within 8 hours following your procedure.- If your IV site becomes painful, red, inflamed, or looks infected.    - Start off with clear liquids by mouth.  You can progress to soft foods on Wednesday if your throat pain improves.      - Use children's Motrin or liquid ibuprofen for discomfort.    - Administer 1800 calories of Boost or Ensure through the tube and extra water or thin juice if needed for more hydration.

## 2024-11-05 ASSESSMENT — PAIN SCALES - GENERAL: PAINLEVEL_OUTOF10: 0 - NO PAIN

## 2024-11-18 ENCOUNTER — LAB (OUTPATIENT)
Dept: LAB | Facility: LAB | Age: 74
End: 2024-11-18
Payer: COMMERCIAL

## 2024-11-18 DIAGNOSIS — E78.2 MIXED HYPERLIPIDEMIA: ICD-10-CM

## 2024-11-18 DIAGNOSIS — Z01.89 ENCOUNTER FOR ROUTINE LABORATORY TESTING: ICD-10-CM

## 2024-11-18 DIAGNOSIS — E55.9 VITAMIN D DEFICIENCY: ICD-10-CM

## 2024-11-18 DIAGNOSIS — R73.03 PREDIABETES: ICD-10-CM

## 2024-11-18 DIAGNOSIS — R07.9 CHEST PAIN, UNSPECIFIED TYPE: ICD-10-CM

## 2024-11-18 LAB
25(OH)D3 SERPL-MCNC: 43 NG/ML (ref 30–100)
ALBUMIN SERPL BCP-MCNC: 3.8 G/DL (ref 3.4–5)
ALP SERPL-CCNC: 60 U/L (ref 33–136)
ALT SERPL W P-5'-P-CCNC: 12 U/L (ref 7–45)
ANION GAP SERPL CALCULATED.3IONS-SCNC: 9 MMOL/L (ref 10–20)
AST SERPL W P-5'-P-CCNC: 20 U/L (ref 9–39)
BASOPHILS # BLD AUTO: 0.06 X10*3/UL (ref 0–0.1)
BASOPHILS NFR BLD AUTO: 0.9 %
BILIRUB SERPL-MCNC: 0.7 MG/DL (ref 0–1.2)
BUN SERPL-MCNC: 15 MG/DL (ref 6–23)
CALCIUM SERPL-MCNC: 9 MG/DL (ref 8.6–10.3)
CHLORIDE SERPL-SCNC: 104 MMOL/L (ref 98–107)
CHOLEST SERPL-MCNC: 155 MG/DL (ref 0–199)
CHOLEST/HDLC SERPL: 3.4 {RATIO}
CO2 SERPL-SCNC: 30 MMOL/L (ref 21–32)
CREAT SERPL-MCNC: 0.68 MG/DL (ref 0.5–1.05)
EGFRCR SERPLBLD CKD-EPI 2021: 90 ML/MIN/1.73M*2
EOSINOPHIL # BLD AUTO: 0.2 X10*3/UL (ref 0–0.4)
EOSINOPHIL NFR BLD AUTO: 3 %
ERYTHROCYTE [DISTWIDTH] IN BLOOD BY AUTOMATED COUNT: 14.7 % (ref 11.5–14.5)
EST. AVERAGE GLUCOSE BLD GHB EST-MCNC: 120 MG/DL
GLUCOSE SERPL-MCNC: 104 MG/DL (ref 74–99)
HBA1C MFR BLD: 5.8 %
HCT VFR BLD AUTO: 39.1 % (ref 36–46)
HDLC SERPL-MCNC: 45.6 MG/DL
HGB BLD-MCNC: 11.9 G/DL (ref 12–16)
IMM GRANULOCYTES # BLD AUTO: 0.01 X10*3/UL (ref 0–0.5)
IMM GRANULOCYTES NFR BLD AUTO: 0.2 % (ref 0–0.9)
LDLC SERPL CALC-MCNC: 86 MG/DL
LYMPHOCYTES # BLD AUTO: 1.24 X10*3/UL (ref 0.8–3)
LYMPHOCYTES NFR BLD AUTO: 18.7 %
MCH RBC QN AUTO: 26.4 PG (ref 26–34)
MCHC RBC AUTO-ENTMCNC: 30.4 G/DL (ref 32–36)
MCV RBC AUTO: 87 FL (ref 80–100)
MONOCYTES # BLD AUTO: 0.65 X10*3/UL (ref 0.05–0.8)
MONOCYTES NFR BLD AUTO: 9.8 %
NEUTROPHILS # BLD AUTO: 4.48 X10*3/UL (ref 1.6–5.5)
NEUTROPHILS NFR BLD AUTO: 67.4 %
NON HDL CHOLESTEROL: 109 MG/DL (ref 0–149)
NRBC BLD-RTO: 0 /100 WBCS (ref 0–0)
PLATELET # BLD AUTO: 257 X10*3/UL (ref 150–450)
POTASSIUM SERPL-SCNC: 4.4 MMOL/L (ref 3.5–5.3)
PROT SERPL-MCNC: 6.9 G/DL (ref 6.4–8.2)
RBC # BLD AUTO: 4.51 X10*6/UL (ref 4–5.2)
SODIUM SERPL-SCNC: 139 MMOL/L (ref 136–145)
TRIGL SERPL-MCNC: 119 MG/DL (ref 0–149)
TSH SERPL-ACNC: 1.92 MIU/L (ref 0.44–3.98)
VLDL: 24 MG/DL (ref 0–40)
WBC # BLD AUTO: 6.6 X10*3/UL (ref 4.4–11.3)

## 2024-11-18 PROCEDURE — 80061 LIPID PANEL: CPT

## 2024-11-18 PROCEDURE — 82306 VITAMIN D 25 HYDROXY: CPT

## 2024-11-18 PROCEDURE — 85025 COMPLETE CBC W/AUTO DIFF WBC: CPT

## 2024-11-18 PROCEDURE — 83036 HEMOGLOBIN GLYCOSYLATED A1C: CPT

## 2024-11-18 PROCEDURE — 84443 ASSAY THYROID STIM HORMONE: CPT

## 2024-11-18 PROCEDURE — 80053 COMPREHEN METABOLIC PANEL: CPT

## 2024-11-22 ENCOUNTER — OFFICE VISIT (OUTPATIENT)
Dept: PRIMARY CARE | Facility: CLINIC | Age: 74
End: 2024-11-22
Payer: MEDICARE

## 2024-11-22 VITALS
SYSTOLIC BLOOD PRESSURE: 116 MMHG | OXYGEN SATURATION: 99 % | TEMPERATURE: 98.2 F | WEIGHT: 139 LBS | BODY MASS INDEX: 21.82 KG/M2 | HEIGHT: 67 IN | DIASTOLIC BLOOD PRESSURE: 70 MMHG | HEART RATE: 69 BPM

## 2024-11-22 DIAGNOSIS — R73.9 HYPERGLYCEMIA: ICD-10-CM

## 2024-11-22 DIAGNOSIS — M85.80 OSTEOPENIA, UNSPECIFIED LOCATION: ICD-10-CM

## 2024-11-22 DIAGNOSIS — E78.2 MIXED HYPERLIPIDEMIA: Primary | ICD-10-CM

## 2024-11-22 DIAGNOSIS — Z12.31 OTHER SCREENING MAMMOGRAM: ICD-10-CM

## 2024-11-22 DIAGNOSIS — K21.9 GASTROESOPHAGEAL REFLUX DISEASE WITHOUT ESOPHAGITIS: ICD-10-CM

## 2024-11-22 DIAGNOSIS — E55.9 VITAMIN D DEFICIENCY: ICD-10-CM

## 2024-11-22 DIAGNOSIS — R73.03 PREDIABETES: ICD-10-CM

## 2024-11-22 PROCEDURE — 99417 PROLNG OP E/M EACH 15 MIN: CPT | Performed by: INTERNAL MEDICINE

## 2024-11-22 PROCEDURE — 99214 OFFICE O/P EST MOD 30 MIN: CPT | Performed by: INTERNAL MEDICINE

## 2024-11-22 PROCEDURE — 1159F MED LIST DOCD IN RCRD: CPT | Performed by: INTERNAL MEDICINE

## 2024-11-22 PROCEDURE — 1125F AMNT PAIN NOTED PAIN PRSNT: CPT | Performed by: INTERNAL MEDICINE

## 2024-11-22 PROCEDURE — 90662 IIV NO PRSV INCREASED AG IM: CPT | Performed by: INTERNAL MEDICINE

## 2024-11-22 PROCEDURE — 1036F TOBACCO NON-USER: CPT | Performed by: INTERNAL MEDICINE

## 2024-11-22 PROCEDURE — 1160F RVW MEDS BY RX/DR IN RCRD: CPT | Performed by: INTERNAL MEDICINE

## 2024-11-22 ASSESSMENT — LIFESTYLE VARIABLES
HOW OFTEN DO YOU HAVE SIX OR MORE DRINKS ON ONE OCCASION: NEVER
HOW OFTEN DO YOU HAVE A DRINK CONTAINING ALCOHOL: NEVER
HAVE YOU OR SOMEONE ELSE BEEN INJURED AS A RESULT OF YOUR DRINKING: NO
HOW OFTEN DURING THE LAST YEAR HAVE YOU FAILED TO DO WHAT WAS NORMALLY EXPECTED FROM YOU BECAUSE OF DRINKING: NEVER
HOW OFTEN DURING THE LAST YEAR HAVE YOU FOUND THAT YOU WERE NOT ABLE TO STOP DRINKING ONCE YOU HAD STARTED: NEVER
HOW MANY STANDARD DRINKS CONTAINING ALCOHOL DO YOU HAVE ON A TYPICAL DAY: PATIENT DOES NOT DRINK
HAS A RELATIVE, FRIEND, DOCTOR, OR ANOTHER HEALTH PROFESSIONAL EXPRESSED CONCERN ABOUT YOUR DRINKING OR SUGGESTED YOU CUT DOWN: NO
AUDIT TOTAL SCORE: 0
HOW OFTEN DURING THE LAST YEAR HAVE YOU HAD A FEELING OF GUILT OR REMORSE AFTER DRINKING: NEVER
SKIP TO QUESTIONS 9-10: 1
HOW OFTEN DURING THE LAST YEAR HAVE YOU BEEN UNABLE TO REMEMBER WHAT HAPPENED THE NIGHT BEFORE BECAUSE YOU HAD BEEN DRINKING: NEVER
AUDIT-C TOTAL SCORE: 0
HOW OFTEN DURING THE LAST YEAR HAVE YOU NEEDED AN ALCOHOLIC DRINK FIRST THING IN THE MORNING TO GET YOURSELF GOING AFTER A NIGHT OF HEAVY DRINKING: NEVER

## 2024-11-22 ASSESSMENT — ENCOUNTER SYMPTOMS
DEPRESSION: 0
DIZZINESS: 1
GASTROINTESTINAL NEGATIVE: 1
OCCASIONAL FEELINGS OF UNSTEADINESS: 0
RESPIRATORY NEGATIVE: 1
LOSS OF SENSATION IN FEET: 0
CARDIOVASCULAR NEGATIVE: 1

## 2024-11-22 ASSESSMENT — PAIN SCALES - GENERAL: PAINLEVEL_OUTOF10: 8

## 2024-11-22 NOTE — PATIENT INSTRUCTIONS
It looks like you are doing really well right now lets leave your medications the same and I will plan on seeing you back in 6 months unless you should need me sooner

## 2024-11-22 NOTE — PROGRESS NOTES
[facility]: [facility]  PROGRESS NOTE      Sally A Behanna is a 74 y.o. female that is presenting today for Follow-up.    Assessment/Plan   Diagnoses and all orders for this visit:  Mixed hyperlipidemia  -     Comprehensive Metabolic Panel; Future  -     Lipid Panel; Future  -     TSH with reflex to Free T4 if abnormal; Future  Vitamin D deficiency  Gastroesophageal reflux disease without esophagitis  -     CBC and Auto Differential; Future  Osteopenia, unspecified location  Prediabetes  Hyperglycemia  -     Hemoglobin A1C; Future  Other screening mammogram  -     BI mammo bilateral screening tomosynthesis; Future  Other orders  -     Follow Up In Primary Care - Established  -     Flu vaccine, trivalent, preservative free, HIGH-DOSE, age 65y+ (Fluzone)  -     Follow Up In Primary Care - Medicare Annual; Future  We reviewed her current situation and her recent blood work  1.  Hyperlipidemia-stable and doing well  2.  Vitamin D deficiency-stable and doing well  3.  Prediabetes-stable and doing well.  4.  History of acid reflux and Zenker's diverticulum-doing well  5.  Osteopenia clinically stable with bone density done this year.    Overall there is no reason for us to change her medications at all we will get her a flu shot today there was some discussion surrounding a problem she had when she had her last flu vaccine.  Recall that she has had numerous flu vaccines in the past with no issues ever in last year she had a somewhat square rash at the flu vaccine site that we think may be related to a Band-Aid containing latex.  She brought her own bandages with her today.    I will plan on seeing her back in 6 months unless needed  Subjective   She is here for her 6-month follow-up visit today.  She has been seeing numerous specialist today and we reviewed the chart of all these visits since I last seen her.  The most specific thing is that she was having some difficulty with swallowing when I had  seen her last time and had the diagnosis of a Zenker's diverticulum which was taken care of by Dr. Freedman.  She is feeling much better now.      Review of Systems   Respiratory: Negative.     Cardiovascular: Negative.    Gastrointestinal: Negative.    Neurological:  Positive for dizziness.        She has some intermittent dizziness which was fully evaluated with no specific diagnosis forthcoming.      Objective   Vitals:    11/22/24 1056   BP: 116/70   Pulse: 69   Temp: 36.8 °C (98.2 °F)   SpO2: 99%      Body mass index is 21.77 kg/m².  Physical Exam  Constitutional:       Appearance: Normal appearance.   Cardiovascular:      Rate and Rhythm: Normal rate and regular rhythm.      Pulses: Normal pulses.      Heart sounds: Normal heart sounds.   Pulmonary:      Effort: Pulmonary effort is normal.      Breath sounds: Normal breath sounds.   Abdominal:      General: Abdomen is flat. Bowel sounds are normal.      Palpations: Abdomen is soft.   Musculoskeletal:         General: Normal range of motion.   Skin:     General: Skin is warm and dry.   Neurological:      General: No focal deficit present.      Mental Status: She is oriented to person, place, and time.       Diagnostic Results   Lab Results   Component Value Date    GLUCOSE 104 (H) 11/18/2024    CALCIUM 9.0 11/18/2024     11/18/2024    K 4.4 11/18/2024    CO2 30 11/18/2024     11/18/2024    BUN 15 11/18/2024    CREATININE 0.68 11/18/2024     Lab Results   Component Value Date    ALT 12 11/18/2024    AST 20 11/18/2024    ALKPHOS 60 11/18/2024    BILITOT 0.7 11/18/2024     Lab Results   Component Value Date    WBC 6.6 11/18/2024    HGB 11.9 (L) 11/18/2024    HCT 39.1 11/18/2024    MCV 87 11/18/2024     11/18/2024     Lab Results   Component Value Date    CHOL 155 11/18/2024    CHOL 166 05/06/2024    CHOL 157 10/23/2023     Lab Results   Component Value Date    HDL 45.6 11/18/2024    HDL 48.0 (L) 05/06/2024    HDL 58.0 10/23/2023     Lab Results  "  Component Value Date    LDLCALC 86 11/18/2024    LDLCALC 96 05/06/2024    LDLCALC 79 10/23/2023     Lab Results   Component Value Date    TRIG 119 11/18/2024    TRIG 111 05/06/2024    TRIG 102 10/23/2023     No components found for: \"CHOLHDL\"  Lab Results   Component Value Date    HGBA1C 5.8 (H) 11/18/2024     Other labs not included in the list above were reviewed either before or during this encounter.    History    Past Medical History:   Diagnosis Date    Abnormal x-ray examination 03/27/2023    Age-related nuclear cataract of left eye 05/06/2024    Age-related nuclear cataract of 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    Allergic contact dermatitis 05/06/2024    Autoimmune disorder (Multi)     Lichen Planus    Bilateral carotid artery stenosis 09/02/2023    Carotid artery disease (CMS-[facility]) 09/02/2023    Cervical lymphadenopathy 05/06/2024    Cheilitis 05/06/2024    Contusion 05/06/2024    Cutis laxa 05/06/2024    Delayed emergence from general anesthesia     Dermatochalasis 09/02/2023    Disorder of carotid artery (CMS-[facility]) 09/02/2023    Disorder of refraction 09/02/2023    Dry eye syndrome of unspecified lacrimal gland 05/17/2017    Dry eye syndrome    Dry eyes 09/02/2023    Dyslipidemia 09/02/2023    Dysphagia 05/06/2024    Essential hypertension 05/06/2024    Fall 05/06/2024    Female pelvic pain 11/14/2013    GERD (gastroesophageal reflux disease)     Heart valve disease     mitral valve prolapse with \"some\" regurgitation    History of left cataract extraction 10/04/2023    Hollenhorst plaque, 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    Hordeolum externum unspecified eye, unspecified eyelid 05/17/2017    Stye    Hyperlipidemia     Immature cataract 02/09/2021    Impaired glucose tolerance 09/02/2023    Lichen sclerosus et atrophicus 09/02/2023    Mitral valve regurgitation 09/02/2023    Nonrheumatic aortic valve stenosis 05/06/2024    Oral lichen planus 05/06/2024    Osteopenia     Osteopenia 09/02/2023    Pain around eye 02/09/2021    Pain " of right breast 09/02/2023    Pain of right upper extremity 05/06/2024    Personal history of diseases of the skin and subcutaneous tissue     History of lichen planus    Presence of intraocular lens 05/06/2024    Pseudophakia     Status post bilateral cataract extraction     Status post left cataract extraction 10/04/2023    Status post right cataract extraction 10/04/2023    Toxic effect of venom of bees, accidental (unintentional), initial encounter 05/17/2017    Bee sting reaction    Vaginal atrophy 09/02/2023    Vitamin D deficiency 09/02/2023     Past Surgical History:   Procedure Laterality Date    CATARACT EXTRACTION      bilateral cataract surgery    CHOLECYSTECTOMY      COLONOSCOPY  2003    COLONOSCOPY  2012    COLONOSCOPY  2014    ESOPHAGOGASTRODUODENOSCOPY  2012    ESOPHAGOGASTRODUODENOSCOPY  2014    MOUTH SURGERY  2018    OOPHORECTOMY Right     OTHER SURGICAL HISTORY  10/24/2022    right ovary removed    OTHER SURGICAL HISTORY  10/24/2022    OTHER SURGICAL HISTORY  10/24/2022    Cardiac catheterization    OTHER SURGICAL HISTORY  10/24/2022    Cholecystectomy    OTHER SURGICAL HISTORY      teeth pulled under anesthesia    PARS PLANA VITRECTOMY Right     2023    THROAT SURGERY      11/4/24     Family History   Problem Relation Name Age of Onset    Diabetes Mother      Heart disease Mother      Heart disease Father      No Known Problems Brother      Cancer Son      Breast cancer Mother's Sister       Social History     Socioeconomic History    Marital status:      Spouse name: Not on file    Number of children: Not on file    Years of education: Not on file    Highest education level: Not on file   Occupational History    Not on file   Tobacco Use    Smoking status: Former     Types: Cigarettes    Smokeless tobacco: Never   Vaping Use    Vaping status: Never Used   Substance and Sexual Activity    Alcohol use: Not Currently    Drug use: Never    Sexual activity: Not on file   Other Topics Concern  "   Not on file   Social History Narrative    Not on file     Social Drivers of Health     Financial Resource Strain: Not on file   Food Insecurity: Not on file   Transportation Needs: Not on file   Physical Activity: Not on file   Stress: Not on file   Social Connections: Not on file   Intimate Partner Violence: Not on file   Housing Stability: Not on file     Allergies   Allergen Reactions    Mercury Other and Unknown     \"Blister\"    Acrylic Acid Unknown     Pt doesn't remember reaction     Amoxicillin-Pot Clavulanate Nausea Only, Other and Unknown     nauseated    Bacitracin Rash    Balsam [place] Other and Unknown     Blisters on tongue    balsm of peru    Ciprofloxacin Nausea Only and Unknown    Codeine Other and Unknown     Pt doesn't remember reaction    feels like she is floating    Gold Au 198 Rash    Gold Salts Other     \"Redness/Erythema\"    Iodinated Contrast Media Hives    Ioversol Other    Latex Swelling and Unknown    Menthol Unknown     Pt doesn't remember reaction    Rubber, Unspecified Unknown    Sulfamethoxazole-Trimethoprim Unknown     Pt doesn't remember reaction     Current Outpatient Medications on File Prior to Visit   Medication Sig Dispense Refill    aspirin 81 mg EC tablet Take 1 tablet (81 mg) by mouth once daily.      atorvastatin (Lipitor) 80 mg tablet Take 1 tablet (80 mg) by mouth once daily. 90 tablet 2    dexAMETHasone 0.5 mg/5 mL solution SWISH AND SPIT 1/2 OZ 3 - 4 TIMES A DAY      esomeprazole (NexIUM) 20 mg DR capsule Take 2 capsules (40 mg) by mouth once daily in the morning. Take before meals.      propylene glycol/peg 400/PF (SYSTANE, PF, OPHT) Administer into affected eye(s).      calcium carbonate-vitamin D3 600 mg-20 mcg (800 unit) tablet Take by mouth once daily. (Patient not taking: Reported on 11/22/2024)      cholecalciferol (Vitamin D-3) 25 MCG (1000 UT) capsule Take 1 capsule (25 mcg) by mouth once daily. (Patient not taking: Reported on 11/22/2024)      [DISCONTINUED] " biotin 1 mg capsule Take 1 capsule (1 mg) by mouth once every 24 hours. (Patient not taking: Reported on 11/22/2024)       No current facility-administered medications on file prior to visit.     Immunization History   Administered Date(s) Administered    DT (pediatric) 05/07/2006    Flu vaccine, quadrivalent, high-dose, preservative free, age 65y+ (FLUZONE) 11/02/2020, 11/01/2021, 10/26/2022, 10/27/2023    Flu vaccine, trivalent, preservative free, HIGH-DOSE, age 65y+ (Fluzone) 10/06/2016, 09/27/2018, 09/25/2019    Influenza, injectable, quadrivalent 10/10/2014, 12/16/2015, 11/06/2017    Influenza, seasonal, injectable 10/28/2011, 10/19/2012, 11/01/2013    Pfizer Purple Cap SARS-CoV-2 03/17/2021, 04/16/2021    Pneumococcal conjugate vaccine, 13-valent (PREVNAR 13) 05/05/2017    Pneumococcal conjugate vaccine, 20-valent (PREVNAR 20) 05/09/2024    Pneumococcal polysaccharide vaccine, 23-valent, age 2 years and older (PNEUMOVAX 23) 11/27/2013, 01/08/2021    Tdap vaccine, age 7 year and older (BOOSTRIX, ADACEL) 10/06/2016, 07/03/2019    Zoster vaccine, recombinant, adult (SHINGRIX) 06/15/2022    Zoster, live 02/07/2011     Patient's medical history was reviewed and updated either before or during this encounter.       Gabino Mayorga MD

## 2024-11-27 ENCOUNTER — OFFICE VISIT (OUTPATIENT)
Dept: OPHTHALMOLOGY | Facility: CLINIC | Age: 74
End: 2024-11-27
Payer: MEDICARE

## 2024-11-27 DIAGNOSIS — Z96.1 PSEUDOPHAKIA OF BOTH EYES: Primary | ICD-10-CM

## 2024-11-27 DIAGNOSIS — H04.129 DRY EYE: ICD-10-CM

## 2024-11-27 DIAGNOSIS — H52.7 REFRACTIVE ERROR: ICD-10-CM

## 2024-11-27 PROCEDURE — 99212 OFFICE O/P EST SF 10 MIN: CPT | Performed by: OPHTHALMOLOGY

## 2024-11-27 ASSESSMENT — SLIT LAMP EXAM - LIDS
COMMENTS: NORMAL
COMMENTS: NORMAL

## 2024-11-27 ASSESSMENT — REFRACTION_MANIFEST
OD_SPHERE: +0.75
OD_ADD: +2.75
OS_AXIS: 080
OS_SPHERE: +1.00
OS_AXIS: 080
OS_CYLINDER: -1.25
OS_ADD: +2.75
OD_AXIS: 095
OD_AXIS: 100
OS_SPHERE: +0.75
OD_CYLINDER: -1.50
METHOD_AUTOREFRACTION: 1
OS_CYLINDER: -1.25
OD_SPHERE: +0.75
OD_CYLINDER: -1.50

## 2024-11-27 ASSESSMENT — REFRACTION_WEARINGRX
OD_CYLINDER: -1.50
OS_AXIS: 078
OS_SPHERE: PLANO
OS_CYLINDER: -1.25
OD_AXIS: 101
SPECS_TYPE: PAL
OS_ADD: +2.75
OD_SPHERE: +0.50
OD_ADD: +2.75

## 2024-11-27 ASSESSMENT — ENCOUNTER SYMPTOMS
HEMATOLOGIC/LYMPHATIC NEGATIVE: 0
ENDOCRINE NEGATIVE: 0
EYES NEGATIVE: 0
ALLERGIC/IMMUNOLOGIC NEGATIVE: 0
GASTROINTESTINAL NEGATIVE: 0
PSYCHIATRIC NEGATIVE: 0
CONSTITUTIONAL NEGATIVE: 0
MUSCULOSKELETAL NEGATIVE: 0
CARDIOVASCULAR NEGATIVE: 0
NEUROLOGICAL NEGATIVE: 0
RESPIRATORY NEGATIVE: 0

## 2024-11-27 ASSESSMENT — KERATOMETRY
OD_K2POWER_DIOPTERS: 44.00
OS_AXISANGLE2_DEGREES: 85
METHOD_AUTO_MANUAL: AUTOMATED
OD_AXISANGLE2_DEGREES: 90
OS_AXISANGLE_DEGREES: 175
OD_K1POWER_DIOPTERS: 43.50
OS_K2POWER_DIOPTERS: 43.00
OD_AXISANGLE_DEGREES: 180
OS_K1POWER_DIOPTERS: 42.25

## 2024-11-27 ASSESSMENT — EXTERNAL EXAM - LEFT EYE: OS_EXAM: NORMAL

## 2024-11-27 ASSESSMENT — VISUAL ACUITY
OS_CC+: +1
OD_CC+: +1
METHOD: SNELLEN - SINGLE
OS_CC: 20/30
OD_CC: 20/70

## 2024-11-27 ASSESSMENT — EXTERNAL EXAM - RIGHT EYE: OD_EXAM: NORMAL

## 2024-11-27 ASSESSMENT — PAIN SCALES - GENERAL: PAINLEVEL_OUTOF10: 0-NO PAIN

## 2024-11-27 NOTE — PROGRESS NOTES
Assessment/Plan   Problem List Items Addressed This Visit       Dry eye     Continue best lubrication for ocular comfort and vision.          Refractive error     Stable RX compared to prior. Advised will provide if interested in filling.          Pseudophakia of both eyes - Primary     Still no signs of recurrent iris optic capture right eye (OD). Some PCO both eyes (OU) but non significant. Will monitor with serial exam.             Provided reassurance regarding above diagnoses and care received in the office visit today. Discussed outcomes and options along with the importance of treatment compliance. Understands the importance of any follow up visits. Patient instructed to call/communicate with our office if any new issues, questions, or concerns.     Will plan to see back as scheduled or sooner PRN

## 2024-11-27 NOTE — ASSESSMENT & PLAN NOTE
Still no signs of recurrent iris optic capture right eye (OD). Some PCO both eyes (OU) but non significant. Will monitor with serial exam.

## 2024-11-27 NOTE — PATIENT INSTRUCTIONS
Thank you so much for choosing me to provide your care today!    If you were dilated your vision may remain blurry   or light sensitive for several hours.    The nature of eye and vision problems can require frequent follow up, please make every effort to adhere to any future appointments.    If you have any issues, questions, or concerns,   please do not hesitate to reach out.    If you receive a survey in regards to your care today, please mention any exceptional care my office staff and/or technicians provided.    You can reach our office at this number:    [phone]    Please consider signing up for and utilizing [employer]!  This is the best way to directly reach me or other  providers

## 2024-12-02 ENCOUNTER — TELEPHONE (OUTPATIENT)
Dept: OPHTHALMOLOGY | Facility: CLINIC | Age: 74
End: 2024-12-02
Payer: MEDICARE

## 2024-12-09 ENCOUNTER — OFFICE VISIT (OUTPATIENT)
Dept: OPHTHALMOLOGY | Facility: CLINIC | Age: 74
End: 2024-12-09
Payer: MEDICARE

## 2024-12-09 ENCOUNTER — TELEPHONE (OUTPATIENT)
Dept: PRIMARY CARE | Facility: CLINIC | Age: 74
End: 2024-12-09
Payer: COMMERCIAL

## 2024-12-09 DIAGNOSIS — H04.129 DRY EYE: ICD-10-CM

## 2024-12-09 DIAGNOSIS — H43.812 POSTERIOR VITREOUS DETACHMENT OF LEFT EYE: Primary | ICD-10-CM

## 2024-12-09 PROCEDURE — 99213 OFFICE O/P EST LOW 20 MIN: CPT | Performed by: OPHTHALMOLOGY

## 2024-12-09 ASSESSMENT — REFRACTION_WEARINGRX
SPECS_TYPE: PAL
OS_ADD: +2.75
OD_AXIS: 101
OS_AXIS: 078
OS_SPHERE: PLANO
OS_CYLINDER: -1.25
OD_SPHERE: +0.50
OD_ADD: +2.75
OD_CYLINDER: -1.50

## 2024-12-09 ASSESSMENT — EXTERNAL EXAM - LEFT EYE: OS_EXAM: NORMAL

## 2024-12-09 ASSESSMENT — ENCOUNTER SYMPTOMS
HEMATOLOGIC/LYMPHATIC NEGATIVE: 0
EYES NEGATIVE: 0
PSYCHIATRIC NEGATIVE: 0
MUSCULOSKELETAL NEGATIVE: 0
CONSTITUTIONAL NEGATIVE: 0
GASTROINTESTINAL NEGATIVE: 0
ENDOCRINE NEGATIVE: 0
RESPIRATORY NEGATIVE: 0
ALLERGIC/IMMUNOLOGIC NEGATIVE: 0
CARDIOVASCULAR NEGATIVE: 0
NEUROLOGICAL NEGATIVE: 0

## 2024-12-09 ASSESSMENT — TONOMETRY
IOP_METHOD: GOLDMANN APPLANATION
OD_IOP_MMHG: 12
OS_IOP_MMHG: 12

## 2024-12-09 ASSESSMENT — VISUAL ACUITY
OS_CC: 20/25
OD_CC+: +2
CORRECTION_TYPE: GLASSES
OD_CC: 20/100
METHOD: SNELLEN - LINEAR
OS_CC+: +1

## 2024-12-09 ASSESSMENT — PAIN SCALES - GENERAL: PAINLEVEL_OUTOF10: 0-NO PAIN

## 2024-12-09 ASSESSMENT — SLIT LAMP EXAM - LIDS
COMMENTS: NORMAL
COMMENTS: NORMAL

## 2024-12-09 ASSESSMENT — EXTERNAL EXAM - RIGHT EYE: OD_EXAM: NORMAL

## 2024-12-09 ASSESSMENT — CUP TO DISC RATIO: OS_RATIO: 0.3

## 2024-12-09 NOTE — PROGRESS NOTES
Assessment/Plan   Problem List Items Addressed This Visit       Dry eye     Continue best lubrication for ocular comfort and vision, likely needs more frequent dosing         Posterior vitreous detachment of left eye - Primary     New PVD left eye (OS). Discussed signs and symptoms of retinal detachment (RD), will call if any new changes. Repeat DFE in 1 month left eye (OS) or sooner PRN.             Provided reassurance regarding above diagnoses and care received in the office visit today. Discussed outcomes and options along with the importance of treatment compliance. Understands the importance of any follow up visits. Patient instructed to call/communicate with our office if any new issues, questions, or concerns.     Will plan to see back in 1 month dilated check OS or sooner PRN

## 2024-12-09 NOTE — PATIENT INSTRUCTIONS
Thank you so much for choosing me to provide your care today!    If you were dilated your vision may remain blurry   or light sensitive for several hours.    The nature of eye and vision problems can require frequent follow up, please make every effort to adhere to any future appointments.    If you have any issues, questions, or concerns,   please do not hesitate to reach out.    If you receive a survey in regards to your care today, please mention any exceptional care my office staff and/or technicians provided.    You can reach our office at this number:    [phone]    Please consider signing up for and utilizing [employer]!  This is the best way to directly reach me or other  providers

## 2024-12-09 NOTE — ASSESSMENT & PLAN NOTE
New PVD left eye (OS). Discussed signs and symptoms of retinal detachment (RD), will call if any new changes. Repeat DFE in 1 month left eye (OS) or sooner PRN.

## 2025-01-02 DIAGNOSIS — E78.5 DYSLIPIDEMIA: ICD-10-CM

## 2025-01-02 NOTE — TELEPHONE ENCOUNTER
Pt needs refill on Atorvastatin 80mg 90 day supply sent to [facility] Deena George & Sameer Roberson.  LOV 12/09/24 NOV 5/23/25

## 2025-01-03 RX ORDER — ATORVASTATIN CALCIUM 80 MG/1
80 TABLET, FILM COATED ORAL DAILY
Qty: 90 TABLET | Refills: 2 | Status: SHIPPED | OUTPATIENT
Start: 2025-01-03

## 2025-01-13 ENCOUNTER — APPOINTMENT (OUTPATIENT)
Dept: OPHTHALMOLOGY | Facility: CLINIC | Age: 75
End: 2025-01-13
Payer: MEDICARE

## 2025-01-16 ENCOUNTER — APPOINTMENT (OUTPATIENT)
Dept: OPHTHALMOLOGY | Facility: CLINIC | Age: 75
End: 2025-01-16
Payer: MEDICARE

## 2025-01-16 DIAGNOSIS — H43.812 POSTERIOR VITREOUS DETACHMENT OF LEFT EYE: Primary | ICD-10-CM

## 2025-01-16 DIAGNOSIS — H35.341 MACULAR HOLE, RIGHT EYE: ICD-10-CM

## 2025-01-16 PROCEDURE — 99213 OFFICE O/P EST LOW 20 MIN: CPT | Performed by: OPHTHALMOLOGY

## 2025-01-16 PROCEDURE — 92134 CPTRZ OPH DX IMG PST SGM RTA: CPT | Performed by: OPHTHALMOLOGY

## 2025-01-16 ASSESSMENT — ENCOUNTER SYMPTOMS
RESPIRATORY NEGATIVE: 0
EYES NEGATIVE: 0
MUSCULOSKELETAL NEGATIVE: 0
ALLERGIC/IMMUNOLOGIC NEGATIVE: 0
GASTROINTESTINAL NEGATIVE: 0
ENDOCRINE NEGATIVE: 0
CARDIOVASCULAR NEGATIVE: 0
PSYCHIATRIC NEGATIVE: 0
HEMATOLOGIC/LYMPHATIC NEGATIVE: 0
CONSTITUTIONAL NEGATIVE: 0
NEUROLOGICAL NEGATIVE: 0

## 2025-01-16 ASSESSMENT — EXTERNAL EXAM - RIGHT EYE: OD_EXAM: NORMAL

## 2025-01-16 ASSESSMENT — SLIT LAMP EXAM - LIDS
COMMENTS: NORMAL
COMMENTS: NORMAL

## 2025-01-16 ASSESSMENT — PAIN SCALES - GENERAL: PAINLEVEL_OUTOF10: 0-NO PAIN

## 2025-01-16 ASSESSMENT — CUP TO DISC RATIO
OS_RATIO: 0.3
OD_RATIO: 0.3

## 2025-01-16 ASSESSMENT — EXTERNAL EXAM - LEFT EYE: OS_EXAM: NORMAL

## 2025-01-16 ASSESSMENT — REFRACTION_WEARINGRX
OS_CYLINDER: -1.25
OD_AXIS: 101
OD_SPHERE: +0.50
OS_SPHERE: PLANO
OD_CYLINDER: -1.50
OS_ADD: +2.75
OD_ADD: +2.75
OS_AXIS: 078
SPECS_TYPE: PAL

## 2025-01-16 ASSESSMENT — VISUAL ACUITY
OD_CC+: +2
OS_CC: 20/30
METHOD: SNELLEN - LINEAR
OD_PH_CC: 20/100
CORRECTION_TYPE: GLASSES
OS_CC+: -2
OD_CC: 20/150
OD_PH_CC+: +1

## 2025-01-16 ASSESSMENT — TONOMETRY
OS_IOP_MMHG: 10
OD_IOP_MMHG: 12
IOP_METHOD: GOLDMANN APPLANATION

## 2025-01-16 NOTE — ASSESSMENT & PLAN NOTE
Stable PVD left eye (OS) without signs of retina compromise on exam or OCT macula. Advised to call if any worsening symptoms. Has regular follow up with retina provider in few months.

## 2025-01-16 NOTE — ASSESSMENT & PLAN NOTE
Despite visual acuity (VA) change right eye (OD), no new signs of retinal compromise right eye (OD). Good anatomic repair with macular hole.

## 2025-01-16 NOTE — PROGRESS NOTES
Assessment/Plan   Problem List Items Addressed This Visit       Macular hole, right eye     Despite visual acuity (VA) change right eye (OD), no new signs of retinal compromise right eye (OD). Good anatomic repair with macular hole.          Relevant Orders    OCT, Retina - OU - Both Eyes    Posterior vitreous detachment of left eye - Primary     Stable PVD left eye (OS) without signs of retina compromise on exam or OCT macula. Advised to call if any worsening symptoms. Has regular follow up with retina provider in few months.             Provided reassurance regarding above diagnoses and care received in the office visit today. Discussed outcomes and options along with the importance of treatment compliance. Understands the importance of any follow up visits. Patient instructed to call/communicate with our office if any new issues, questions, or concerns.     Will plan to see back in 1 year full or sooner PRN

## 2025-01-16 NOTE — PATIENT INSTRUCTIONS
Thank you so much for choosing me to provide your care today!    If you were dilated your vision may remain blurry   or light sensitive for several hours.    The nature of eye and vision problems can require frequent follow up, please make every effort to adhere to any future appointments.    If you have any issues, questions, or concerns,   please do not hesitate to reach out.    If you receive a survey in regards to your care today, please mention any exceptional care my office staff and/or technicians provided.    You can reach our office at this number:    [phone]    Please consider signing up for and utilizing [employer]!  This is the best way to directly reach me or other  providers

## 2025-01-20 ENCOUNTER — OFFICE VISIT (OUTPATIENT)
Dept: CARDIOLOGY | Facility: CLINIC | Age: 75
End: 2025-01-20
Payer: MEDICARE

## 2025-01-20 VITALS
TEMPERATURE: 98.6 F | DIASTOLIC BLOOD PRESSURE: 68 MMHG | HEIGHT: 67 IN | HEART RATE: 59 BPM | BODY MASS INDEX: 21.97 KG/M2 | RESPIRATION RATE: 16 BRPM | OXYGEN SATURATION: 98 % | WEIGHT: 140 LBS | SYSTOLIC BLOOD PRESSURE: 126 MMHG

## 2025-01-20 DIAGNOSIS — I65.23 BILATERAL CAROTID ARTERY STENOSIS: ICD-10-CM

## 2025-01-20 DIAGNOSIS — E78.5 DYSLIPIDEMIA: Primary | ICD-10-CM

## 2025-01-20 DIAGNOSIS — I34.0 MITRAL VALVE INSUFFICIENCY, UNSPECIFIED ETIOLOGY: ICD-10-CM

## 2025-01-20 PROCEDURE — 99214 OFFICE O/P EST MOD 30 MIN: CPT | Performed by: INTERNAL MEDICINE

## 2025-01-20 PROCEDURE — 1126F AMNT PAIN NOTED NONE PRSNT: CPT | Performed by: INTERNAL MEDICINE

## 2025-01-20 PROCEDURE — 1159F MED LIST DOCD IN RCRD: CPT | Performed by: INTERNAL MEDICINE

## 2025-01-20 PROCEDURE — 1036F TOBACCO NON-USER: CPT | Performed by: INTERNAL MEDICINE

## 2025-01-20 ASSESSMENT — ENCOUNTER SYMPTOMS
MYALGIAS: 0
DIZZINESS: 0
DYSPNEA ON EXERTION: 0
PALPITATIONS: 0
SYNCOPE: 0
ORTHOPNEA: 0
PND: 0
SHORTNESS OF BREATH: 0
OCCASIONAL FEELINGS OF UNSTEADINESS: 0
DIAPHORESIS: 0
IRREGULAR HEARTBEAT: 0
WEAKNESS: 0
WEIGHT GAIN: 0
COUGH: 0
WHEEZING: 0
WEIGHT LOSS: 0
DEPRESSION: 0
FEVER: 0
NEAR-SYNCOPE: 0
CLAUDICATION: 0
LOSS OF SENSATION IN FEET: 0

## 2025-01-20 ASSESSMENT — LIFESTYLE VARIABLES
SKIP TO QUESTIONS 9-10: 1
AUDIT-C TOTAL SCORE: 0
HOW OFTEN DO YOU HAVE SIX OR MORE DRINKS ON ONE OCCASION: NEVER
HOW MANY STANDARD DRINKS CONTAINING ALCOHOL DO YOU HAVE ON A TYPICAL DAY: PATIENT DOES NOT DRINK
HOW OFTEN DO YOU HAVE A DRINK CONTAINING ALCOHOL: NEVER

## 2025-01-20 ASSESSMENT — PAIN SCALES - GENERAL: PAINLEVEL_OUTOF10: 0-NO PAIN

## 2025-01-20 NOTE — PROGRESS NOTES
Subjective      Chief Complaint   Patient presents with    Follow-up     Denicely A Behanna presents to the office today for a 6 month follow up.           74-year-old female with hypertension, hyperlipidemia and evidence atherosclerotic disease (Hollenhorst plaque). She presents for follow-up. She has mild Aortic stenosis with her last echo June 2024 showing a peak velocity of 252cm/s, mean grd 14mmHg. She also has a history of carotid artery stenosis and had followed regularly with [facility], 50-60% JA stenosis, mild LICA stenosis. She was last seen 6 months ago. We discussed her LDL of 97, goal of <70. We discussed PCSK9-Inh, zetia. She did not want to add another medication, and elected to make some lifestyle modification.              Review of Systems   Constitutional: Negative for diaphoresis, fever, weight gain and weight loss.   Eyes:  Negative for visual disturbance.   Cardiovascular:  Negative for chest pain, claudication, dyspnea on exertion, irregular heartbeat, leg swelling, near-syncope, orthopnea, palpitations, paroxysmal nocturnal dyspnea and syncope.   Respiratory:  Negative for cough, shortness of breath and wheezing.    Musculoskeletal:  Negative for muscle weakness and myalgias.   Neurological:  Negative for dizziness and weakness.   All other systems reviewed and are negative.       Past Medical History:   Diagnosis Date    Abnormal x-ray examination 03/27/2023    Age-related nuclear cataract of left eye 05/06/2024    Age-related nuclear cataract of 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    Allergic contact dermatitis 05/06/2024    Autoimmune disorder (Multi)     Lichen Planus    Bilateral carotid artery stenosis 09/02/2023    Carotid artery disease (CMS-HCC) 09/02/2023    Cervical lymphadenopathy 05/06/2024    Cheilitis 05/06/2024    Contusion 05/06/2024    Cutis laxa 05/06/2024    Delayed emergence from general anesthesia     Dermatochalasis 09/02/2023    Disorder of carotid artery (CMS-HCC) 09/02/2023    Disorder  "of refraction 09/02/2023    Dry eye syndrome of unspecified lacrimal gland 05/17/2017    Dry eye syndrome    Dry eyes 09/02/2023    Dyslipidemia 09/02/2023    Dysphagia 05/06/2024    Essential hypertension 05/06/2024    Fall 05/06/2024    Female pelvic pain 11/14/2013    GERD (gastroesophageal reflux disease)     Heart valve disease     mitral valve prolapse with \"some\" regurgitation    History of left cataract extraction 10/04/2023    Hollenhorst plaque, 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    Hordeolum externum unspecified eye, unspecified eyelid 05/17/2017    Stye    Hyperlipidemia     Immature cataract 02/09/2021    Impaired glucose tolerance 09/02/2023    Lichen sclerosus et atrophicus 09/02/2023    Mitral valve regurgitation 09/02/2023    Nonrheumatic aortic valve stenosis 05/06/2024    Oral lichen planus 05/06/2024    Osteopenia     Osteopenia 09/02/2023    Pain around eye 02/09/2021    Pain of right breast 09/02/2023    Pain of right upper extremity 05/06/2024    Personal history of diseases of the skin and subcutaneous tissue     History of lichen planus    Presence of intraocular lens 05/06/2024    Pseudophakia     Status post bilateral cataract extraction     Status post left cataract extraction 10/04/2023    Status post right cataract extraction 10/04/2023    Toxic effect of venom of bees, accidental (unintentional), initial encounter 05/17/2017    Bee sting reaction    Vaginal atrophy 09/02/2023    Vitamin D deficiency 09/02/2023        Past Surgical History:   Procedure Laterality Date    CATARACT EXTRACTION      bilateral cataract surgery    CHOLECYSTECTOMY      COLONOSCOPY  2003    COLONOSCOPY  2012    COLONOSCOPY  2014    ESOPHAGOGASTRODUODENOSCOPY  2012    ESOPHAGOGASTRODUODENOSCOPY  2014    MOUTH SURGERY  2018    OOPHORECTOMY Right     OTHER SURGICAL HISTORY  10/24/2022    right ovary removed    OTHER SURGICAL HISTORY  10/24/2022    OTHER SURGICAL HISTORY  10/24/2022    Cardiac catheterization    OTHER SURGICAL " HISTORY  10/24/2022    Cholecystectomy    OTHER SURGICAL HISTORY      teeth pulled under anesthesia    PARS PLANA VITRECTOMY Right         THROAT SURGERY      22        Social History     Socioeconomic History    Marital status:      Spouse name: Not on file    Number of children: Not on file    Years of education: Not on file    Highest education level: Not on file   Occupational History    Not on file   Tobacco Use    Smoking status: Former     Current packs/day: 0.00     Types: Cigarettes     Quit date:      Years since quittin.0    Smokeless tobacco: Never   Vaping Use    Vaping status: Never Used   Substance and Sexual Activity    Alcohol use: Not Currently    Drug use: Never    Sexual activity: Defer   Other Topics Concern    Not on file   Social History Narrative    Not on file     Social Drivers of Health     Financial Resource Strain: Not on file   Food Insecurity: Not on file   Transportation Needs: Not on file   Physical Activity: Not on file   Stress: Not on file   Social Connections: Not on file   Intimate Partner Violence: Not on file   Housing Stability: Not on file        Family History   Problem Relation Name Age of Onset    Diabetes Mother      Heart disease Mother      Heart disease Father      No Known Problems Brother      Cancer Son      Breast cancer Mother's Sister          OBJECTIVE:    Vitals:    25 1033   BP: 126/68   Pulse: 59   Resp: 16   Temp: 37 °C (98.6 °F)   SpO2: 98%        Vitals reviewed.   Constitutional:       Appearance: Normal and healthy appearance. Not in distress.   Pulmonary:      Effort: Pulmonary effort is normal.      Breath sounds: Normal breath sounds.   Cardiovascular:      Normal rate. Regular rhythm. Normal S1. Normal S2.       Murmurs: There is a grade 2/6 holosystolic murmur at the apex.      No gallop.  No click.   Pulses:     Intact distal pulses.   Edema:     Peripheral edema absent.   Skin:     General: Skin is warm and dry.    Neurological:      General: No focal deficit present.          Lab Review:   Lab Results   Component Value Date     11/18/2024    K 4.4 11/18/2024     11/18/2024    CO2 30 11/18/2024    BUN 15 11/18/2024    CREATININE 0.68 11/18/2024    GLUCOSE 104 (H) 11/18/2024    CALCIUM 9.0 11/18/2024     Lab Results   Component Value Date    CHOL 155 11/18/2024    TRIG 119 11/18/2024    HDL 45.6 11/18/2024       Lab Results   Component Value Date    LDLCALC 86 11/18/2024        Mitral valve regurgitation  Mild progressive stage B. Repeat echo 6/2026    Dyslipidemia  She mentions that often she will only take 1/2 dose atorvastatin, has to cut in half to swallow and sometimes forgets the second one. Advised to take the full dose. Will reassess    Bilateral carotid artery stenosis  Continue high potency statin. Will arrange for carotid US

## 2025-01-20 NOTE — ASSESSMENT & PLAN NOTE
She mentions that often she will only take 1/2 dose atorvastatin, has to cut in half to swallow and sometimes forgets the second one. Advised to take the full dose. Will reassess

## 2025-01-21 ENCOUNTER — DOCUMENTATION (OUTPATIENT)
Dept: PHYSICAL THERAPY | Facility: CLINIC | Age: 75
End: 2025-01-21
Payer: MEDICARE

## 2025-01-21 DIAGNOSIS — R27.8 SENSORY ATAXIA: Primary | ICD-10-CM

## 2025-01-21 NOTE — PROGRESS NOTES
Physical Therapy    Discharge Summary    Name: Sally A Behanna  MRN: [id number]  Date: 1/21/2025    Discharge Information:   Date of discharge 1/21/2025  Date of last visit 10/18/24  Date of evaluation 9/5/2024  Number of attended visits 7    Referred for sensory ataxia    Therapy Summary: Pt had progress with PT sessions including addressing cervical impairments but required procedures for comorbidities, did not return.    Discharge Reason(s): Did not schedule additional appointments

## 2025-02-11 ENCOUNTER — TELEPHONE (OUTPATIENT)
Dept: PRIMARY CARE | Facility: CLINIC | Age: 75
End: 2025-02-11
Payer: MEDICARE

## 2025-02-12 ENCOUNTER — TELEPHONE (OUTPATIENT)
Dept: PRIMARY CARE | Facility: CLINIC | Age: 75
End: 2025-02-12
Payer: MEDICARE

## 2025-02-12 DIAGNOSIS — I65.23 BILATERAL CAROTID ARTERY STENOSIS: ICD-10-CM

## 2025-02-12 NOTE — TELEPHONE ENCOUNTER
Spoke with and she is needing a referral to a vascular dr.  Dr Amaya left and she will need another dr.  She would like to keep in this area for a .

## 2025-02-12 NOTE — TELEPHONE ENCOUNTER
Attempted to reach pt to clarify why vasc dr was needed. No answer. Lmom requesting she return call to office

## 2025-02-14 ENCOUNTER — HOSPITAL ENCOUNTER (OUTPATIENT)
Dept: RADIOLOGY | Facility: CLINIC | Age: 75
Discharge: HOME | End: 2025-02-14
Payer: MEDICARE

## 2025-02-14 VITALS — WEIGHT: 135 LBS | BODY MASS INDEX: 21.19 KG/M2 | HEIGHT: 67 IN

## 2025-02-14 DIAGNOSIS — Z12.31 OTHER SCREENING MAMMOGRAM: ICD-10-CM

## 2025-02-14 PROCEDURE — 77063 BREAST TOMOSYNTHESIS BI: CPT

## 2025-02-14 PROCEDURE — 77063 BREAST TOMOSYNTHESIS BI: CPT | Performed by: RADIOLOGY

## 2025-02-14 PROCEDURE — 77067 SCR MAMMO BI INCL CAD: CPT | Performed by: RADIOLOGY

## 2025-02-18 ENCOUNTER — APPOINTMENT (OUTPATIENT)
Dept: GASTROENTEROLOGY | Facility: CLINIC | Age: 75
End: 2025-02-18
Payer: MEDICARE

## 2025-03-07 ENCOUNTER — APPOINTMENT (OUTPATIENT)
Dept: VASCULAR SURGERY | Facility: CLINIC | Age: 75
End: 2025-03-07
Payer: MEDICARE

## 2025-03-11 ENCOUNTER — APPOINTMENT (OUTPATIENT)
Dept: VASCULAR SURGERY | Facility: CLINIC | Age: 75
End: 2025-03-11
Payer: MEDICARE

## 2025-03-14 ENCOUNTER — OFFICE VISIT (OUTPATIENT)
Dept: VASCULAR SURGERY | Facility: CLINIC | Age: 75
End: 2025-03-14
Payer: MEDICARE

## 2025-03-14 ENCOUNTER — ANCILLARY PROCEDURE (OUTPATIENT)
Dept: VASCULAR MEDICINE | Facility: CLINIC | Age: 75
End: 2025-03-14
Payer: MEDICARE

## 2025-03-14 VITALS — HEART RATE: 63 BPM | SYSTOLIC BLOOD PRESSURE: 142 MMHG | RESPIRATION RATE: 18 BRPM | DIASTOLIC BLOOD PRESSURE: 80 MMHG

## 2025-03-14 DIAGNOSIS — I65.23 BILATERAL CAROTID ARTERY STENOSIS: ICD-10-CM

## 2025-03-14 DIAGNOSIS — I65.23 CAROTID STENOSIS, BILATERAL: Primary | ICD-10-CM

## 2025-03-14 PROCEDURE — 99213 OFFICE O/P EST LOW 20 MIN: CPT | Mod: 25 | Performed by: NURSE PRACTITIONER

## 2025-03-14 PROCEDURE — 1036F TOBACCO NON-USER: CPT | Performed by: NURSE PRACTITIONER

## 2025-03-14 PROCEDURE — 93880 EXTRACRANIAL BILAT STUDY: CPT | Performed by: INTERNAL MEDICINE

## 2025-03-14 PROCEDURE — 1159F MED LIST DOCD IN RCRD: CPT | Performed by: NURSE PRACTITIONER

## 2025-03-14 PROCEDURE — 93880 EXTRACRANIAL BILAT STUDY: CPT

## 2025-03-14 PROCEDURE — 1126F AMNT PAIN NOTED NONE PRSNT: CPT | Performed by: NURSE PRACTITIONER

## 2025-03-14 PROCEDURE — 99203 OFFICE O/P NEW LOW 30 MIN: CPT | Performed by: NURSE PRACTITIONER

## 2025-03-14 ASSESSMENT — ENCOUNTER SYMPTOMS
DEPRESSION: 0
OCCASIONAL FEELINGS OF UNSTEADINESS: 0
LOSS OF SENSATION IN FEET: 0

## 2025-03-14 ASSESSMENT — PAIN SCALES - GENERAL: PAINLEVEL_OUTOF10: 0-NO PAIN

## 2025-03-14 NOTE — PROGRESS NOTES
History Of Present Illness  Sally A Behanna is a 74 y.o. white female, who presents to the office today for evaluation of her carotid arteries.  She has a longstanding history of carotid stenosis.  She was seen by Dr. Bill Amaya for many years, for this issue.  The patient has no complaints of TIA, stroke, or amaurosis fugax.  She has no complaints of claudication or ischemic rest pain.     Past Medical History  She has a past medical history of Abnormal x-ray examination (03/27/2023), Age-related nuclear cataract of left eye (05/06/2024), Age-related nuclear cataract of 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), Allergic contact dermatitis (05/06/2024), Autoimmune disorder (Multi), Bilateral carotid artery stenosis (09/02/2023), Carotid artery disease (CMS-[facility]) (09/02/2023), Cervical lymphadenopathy (05/06/2024), Cheilitis (05/06/2024), Contusion (05/06/2024), Cutis laxa (05/06/2024), Delayed emergence from general anesthesia, Dermatochalasis (09/02/2023), Disorder of carotid artery (CMS-[facility]) (09/02/2023), Disorder of refraction (09/02/2023), Dry eye syndrome of unspecified lacrimal gland (05/17/2017), Dry eyes (09/02/2023), Dyslipidemia (09/02/2023), Dysphagia (05/06/2024), Essential hypertension (05/06/2024), Fall (05/06/2024), Female pelvic pain (11/14/2013), GERD (gastroesophageal reflux disease), Heart valve disease, History of left cataract extraction (10/04/2023), Hollenhorst plaque, 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), Hordeolum externum unspecified eye, unspecified eyelid (05/17/2017), Hyperlipidemia, Immature cataract (02/09/2021), Impaired glucose tolerance (09/02/2023), Lichen sclerosus et atrophicus (09/02/2023), Mitral valve regurgitation (09/02/2023), Nonrheumatic aortic valve stenosis (05/06/2024), 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    Surgical History  She has a past surgical history that includes Other surgical history (10/24/2022); Other surgical history (10/24/2022); Other surgical history (10/24/2022); Other surgical history (10/24/2022); Cataract extraction; Other surgical history; Colonoscopy (2003); Esophagogastroduodenoscopy (2012); Colonoscopy (2012); Esophagogastroduodenoscopy (2014); Colonoscopy (2014); Cholecystectomy; Oophorectomy (Right); Mouth surgery (2018); Pars plana vitrectomy (Right); and Throat surgery.     Social History  She reports that she quit smoking about 57 years ago. Her smoking use included cigarettes. She has never used smokeless tobacco. She reports that she does not currently use alcohol. She reports that she does not use drugs.    Family History  Family History   Problem Relation Name Age of Onset    Diabetes Mother      Heart disease Mother      Heart disease Father      No Known Problems Brother      Cancer Son      Breast cancer Mother's Sister          Allergies  Mercury; Acrylic acid; Amoxicillin-pot clavulanate; Bacitracin; Balsam peru; Ciprofloxacin; Codeine; Gold au 198; Gold salts; Iodinated contrast media; Ioversol; Latex; Menthol; Rubber, unspecified; and Sulfamethoxazole-trimethoprim    Review of Systems   CONSTITUTIONAL: Denies weight loss, fever and chills.  HEENT: Denies changes in vision and hearing.  RESPIRATORY: Denies SOB and cough.  CV: Denies palpitations and CP.  GI: Denies abdominal pain, nausea, vomiting and diarrhea.   : Denies dysuria and urinary frequency.   MSK: Denies myalgia and joint pain.  SKIN: Denies rash and  pruritus.   VASC: Denies claudication and ischemic rest pain.  NEUROLOGICAL: Denies headache and syncope.  PSYCHIATRIC: Denies recent changes in mood. Denies anxiety and depression.    Physical exam  Constitutional:  Alert and oriented to person, place, and time.  In no acute distress.    HEENT:  Head is atraumatic, normocephalic.    Neck:  Soft and nontender.  No cervical bruits present.    Respiratory:  Lungs clear to auscultation.    Cardiac:  Regular rate and rhythm.  No murmurs present.      Abdominal:  Soft, nontender, nondistended, with bowel sounds present.   Musculoskeletal:  Moves extremities freely.  Dermatological: Skin color, texture, turgor normal.  No rashes of lesions present.    Neurological:  No focal deficits.    Psych:  Calm, cooperative.  Answers questions appropriately.      Last Recorded Vitals  /80 (BP Location: Right arm, Patient Position: Sitting, BP Cuff Size: Adult)   Pulse 63   Resp 18     Relevant Results  Current Outpatient Medications   Medication Instructions    aspirin 81 mg, Daily    atorvastatin (LIPITOR) 80 mg, oral, Daily    calcium carbonate-vitamin D3 600 mg-20 mcg (800 unit) tablet Daily RT    cholecalciferol (VITAMIN D-3) 25 mcg, Daily    dexAMETHasone 0.5 mg/5 mL solution SWISH AND SPIT 1/2 OZ 3 - 4 TIMES A DAY    esomeprazole (NEXIUM) 40 mg, Daily before breakfast    propylene glycol/peg 400/PF (SYSTANE, PF, OPHT) 2 times daily            Assessment/Plan     Carotid Stenosis    Carotid Duplex 3/14/25:    50-69% stenosis right ICA  <50% stenosis left ICA    Carotid duplex scan explained to patient in detail.  Patient's carotid arteries stable from a vascular standpoint.  Patient to return to our office in 1 year for a repeat carotid duplex scan.         Toshia Raman, APRN-CNP

## 2025-03-20 ENCOUNTER — APPOINTMENT (OUTPATIENT)
Dept: OPHTHALMOLOGY | Facility: CLINIC | Age: 75
End: 2025-03-20
Payer: MEDICARE

## 2025-03-20 DIAGNOSIS — H43.812 POSTERIOR VITREOUS DETACHMENT OF LEFT EYE: Primary | ICD-10-CM

## 2025-03-20 DIAGNOSIS — H35.341 MACULAR HOLE, RIGHT EYE: ICD-10-CM

## 2025-03-20 DIAGNOSIS — H43.823 VITREOMACULAR TRACTION SYNDROME OF BOTH EYES: ICD-10-CM

## 2025-03-20 PROCEDURE — 92134 CPTRZ OPH DX IMG PST SGM RTA: CPT | Performed by: OPHTHALMOLOGY

## 2025-03-20 PROCEDURE — 99213 OFFICE O/P EST LOW 20 MIN: CPT | Performed by: OPHTHALMOLOGY

## 2025-03-20 ASSESSMENT — VISUAL ACUITY
OD_CC: 20/80-2
METHOD: SNELLEN - LINEAR
OS_PH_CC: 20/20-2
OS_CC: 20/25-1
OD_PH_CC: 20/80+2

## 2025-03-20 ASSESSMENT — EXTERNAL EXAM - LEFT EYE: OS_EXAM: NORMAL

## 2025-03-20 ASSESSMENT — TONOMETRY
OS_IOP_MMHG: 12
IOP_METHOD: GOLDMANN APPLANATION
OD_IOP_MMHG: 15

## 2025-03-20 ASSESSMENT — SLIT LAMP EXAM - LIDS
COMMENTS: NORMAL
COMMENTS: NORMAL

## 2025-03-20 ASSESSMENT — CUP TO DISC RATIO
OD_RATIO: 0.3
OS_RATIO: 0.3

## 2025-03-20 ASSESSMENT — EXTERNAL EXAM - RIGHT EYE: OD_EXAM: NORMAL

## 2025-03-20 NOTE — PROGRESS NOTES
Assessment/Plan   Diagnoses and all orders for this visit:  Posterior vitreous detachment of left eye  -     OCT, Retina - OU - Both Eyes  Vitreomacular traction syndrome of both eyes  -     OCT, Retina - OU - Both Eyes  Macular hole, right eye  -     OCT, Retina - OU - Both Eyes  800 micron FTMH now sealed    OCT : 03/20/25     OD: Normal Foveal Contour & Retinal laminations, EZ line preserved, (-) IRF/subretinal fluid (SRF), CST-WNL  OS: Normal Foveal Contour & Retinal laminations, EZ line preserved, (-) IRF/subretinal fluid (SRF), CST-WNL      POM#4 status post (s/p) pars plana vitrectomy (PPV) & membrane peel 10/12/23   Mac hole sealed, excellent anatomic outcome  The lens intraocular lens (IOL) appears to have some pupillary capture    Will check undilated no significant afferent pupillary defect (APD) OD just slow  Maybe post op

## 2025-03-21 ENCOUNTER — TELEPHONE (OUTPATIENT)
Dept: OPHTHALMOLOGY | Facility: CLINIC | Age: 75
End: 2025-03-21
Payer: MEDICARE

## 2025-03-21 NOTE — TELEPHONE ENCOUNTER
Pt said that she is done with surgeries with Dr. Jacobs and Dr. Jacobs suggested she sees you now to see if she needs a new glasses prescription.   Schedule?

## 2025-04-02 ENCOUNTER — OFFICE VISIT (OUTPATIENT)
Dept: OPHTHALMOLOGY | Facility: CLINIC | Age: 75
End: 2025-04-02
Payer: MEDICARE

## 2025-04-02 DIAGNOSIS — H04.129 DRY EYE: ICD-10-CM

## 2025-04-02 DIAGNOSIS — H52.7 REFRACTIVE ERROR: ICD-10-CM

## 2025-04-02 DIAGNOSIS — Z96.1 PSEUDOPHAKIA OF BOTH EYES: Primary | ICD-10-CM

## 2025-04-02 PROCEDURE — 99213 OFFICE O/P EST LOW 20 MIN: CPT | Performed by: OPHTHALMOLOGY

## 2025-04-02 ASSESSMENT — VISUAL ACUITY
OD_CC: 20/70
OS_CC+: +1
OS_CC: 20/25
METHOD: SNELLEN - SINGLE
OD_CC+: -1
CORRECTION_TYPE: GLASSES

## 2025-04-02 ASSESSMENT — REFRACTION_MANIFEST
OD_AXIS: 100
OD_AXIS: 095
METHOD_AUTOREFRACTION: 1
OD_SPHERE: +0.75
OD_CYLINDER: -1.50
OS_ADD: +2.75
OS_AXIS: 090
OS_CYLINDER: -1.25
OS_AXIS: 080
OD_ADD: +2.75
OS_CYLINDER: -1.50
OS_SPHERE: +0.75
OS_SPHERE: +0.75
OD_CYLINDER: -1.50
OD_SPHERE: +0.75

## 2025-04-02 ASSESSMENT — REFRACTION_WEARINGRX
OD_AXIS: 101
OS_SPHERE: +0.75
OD_CYLINDER: -1.50
OS_AXIS: 080
OS_CYLINDER: -1.25
OD_SPHERE: +0.75
OS_ADD: +2.75
OD_ADD: +2.75
SPECS_TYPE: PAL

## 2025-04-02 ASSESSMENT — KERATOMETRY
OD_K1POWER_DIOPTERS: 43.25
OD_AXISANGLE2_DEGREES: 95
OD_AXISANGLE_DEGREES: 5
METHOD_AUTO_MANUAL: AUTOMATED
OS_AXISANGLE_DEGREES: 10
OS_K2POWER_DIOPTERS: 43.25
OD_K2POWER_DIOPTERS: 44.25
OS_AXISANGLE2_DEGREES: 100
OS_K1POWER_DIOPTERS: 42.25

## 2025-04-02 ASSESSMENT — ENCOUNTER SYMPTOMS
ALLERGIC/IMMUNOLOGIC NEGATIVE: 0
RESPIRATORY NEGATIVE: 0
HEMATOLOGIC/LYMPHATIC NEGATIVE: 0
CARDIOVASCULAR NEGATIVE: 0
NEUROLOGICAL NEGATIVE: 0
MUSCULOSKELETAL NEGATIVE: 0
GASTROINTESTINAL NEGATIVE: 0
PSYCHIATRIC NEGATIVE: 0
ENDOCRINE NEGATIVE: 0
EYES NEGATIVE: 0
CONSTITUTIONAL NEGATIVE: 0

## 2025-04-02 ASSESSMENT — PAIN SCALES - GENERAL: PAINLEVEL_OUTOF10: 0-NO PAIN

## 2025-04-02 ASSESSMENT — EXTERNAL EXAM - LEFT EYE: OS_EXAM: NORMAL

## 2025-04-02 ASSESSMENT — SLIT LAMP EXAM - LIDS
COMMENTS: NORMAL
COMMENTS: NORMAL

## 2025-04-02 ASSESSMENT — EXTERNAL EXAM - RIGHT EYE: OD_EXAM: NORMAL

## 2025-04-02 NOTE — PROGRESS NOTES
Assessment/Plan   Problem List Items Addressed This Visit       Dry eye     Advised to continue best lubrication to maintain ocular comfort and best vision.          Refractive error     Stable RX with prior and what is wearing, wouldn't recommend update at this time. Understands vision is likely at best corrected level. Refraction performed today for diagnostic purposes only and without specific intent to dispense Rx. Glasses/SCL Rx not dispensed today.            Pseudophakia of both eyes - Primary     Still no signs of recurrent iris optic capture right eye (OD). Some PCO both eyes (OU) but non significant. Will monitor with serial exam.             Provided reassurance regarding above diagnoses and care received in the office visit today. Discussed outcomes and options along with the importance of treatment compliance. Understands the importance of any follow up visits. Patient instructed to call/communicate with our office if any new issues, questions, or concerns.     Will plan to see back as scheduled or sooner PRN

## 2025-04-02 NOTE — ASSESSMENT & PLAN NOTE
Stable RX with prior and what is wearing, wouldn't recommend update at this time. Understands vision is likely at best corrected level. Refraction performed today for diagnostic purposes only and without specific intent to dispense Rx. Glasses/SCL Rx not dispensed today.

## 2025-04-02 NOTE — PATIENT INSTRUCTIONS
Thank you so much for choosing me to provide your care today!    If you were dilated your vision may remain blurry   or light sensitive for several hours.    The nature of eye and vision problems can require frequent follow up, please make every effort to adhere to any future appointments.    If you have any issues, questions, or concerns,   please do not hesitate to reach out.    If you receive a survey in regards to your care today, please mention any exceptional care my office staff and/or technicians provided.    You can reach our office at this number:    [phone]    Please consider signing up for and utilizing [employer]!  This is the best way to directly reach me or other  providers

## 2025-04-12 ENCOUNTER — HOSPITAL ENCOUNTER (EMERGENCY)
Facility: HOSPITAL | Age: 75
Discharge: HOME | End: 2025-04-12
Attending: STUDENT IN AN ORGANIZED HEALTH CARE EDUCATION/TRAINING PROGRAM
Payer: MEDICARE

## 2025-04-12 ENCOUNTER — APPOINTMENT (OUTPATIENT)
Dept: RADIOLOGY | Facility: HOSPITAL | Age: 75
End: 2025-04-12
Payer: MEDICARE

## 2025-04-12 ENCOUNTER — APPOINTMENT (OUTPATIENT)
Dept: CARDIOLOGY | Facility: HOSPITAL | Age: 75
End: 2025-04-12
Payer: MEDICARE

## 2025-04-12 VITALS
BODY MASS INDEX: 21.45 KG/M2 | DIASTOLIC BLOOD PRESSURE: 70 MMHG | RESPIRATION RATE: 12 BRPM | OXYGEN SATURATION: 99 % | SYSTOLIC BLOOD PRESSURE: 177 MMHG | WEIGHT: 136.69 LBS | HEIGHT: 67 IN | HEART RATE: 63 BPM | TEMPERATURE: 98.4 F
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
## 2025-04-12 DIAGNOSIS — M25.561 ACUTE PAIN OF RIGHT KNEE: ICD-10-CM

## 2025-04-12 PROCEDURE — 73030 X-RAY EXAM OF SHOULDER: CPT | Mod: RIGHT SIDE | Performed by: RADIOLOGY

## 2025-04-12 PROCEDURE — 93005 ELECTROCARDIOGRAM TRACING: CPT

## 2025-04-12 PROCEDURE — 2500000004 HC RX 250 GENERAL PHARMACY W/ HCPCS (ALT 636 FOR OP/ED)

## 2025-04-12 PROCEDURE — 96372 THER/PROPH/DIAG INJ SC/IM: CPT

## 2025-04-12 PROCEDURE — 99285 EMERGENCY DEPT VISIT HI MDM: CPT | Mod: 25 | Performed by: STUDENT IN AN ORGANIZED HEALTH CARE EDUCATION/TRAINING PROGRAM

## 2025-04-12 PROCEDURE — 73564 X-RAY EXAM KNEE 4 OR MORE: CPT | Mod: RT

## 2025-04-12 PROCEDURE — 72125 CT NECK SPINE W/O DYE: CPT

## 2025-04-12 PROCEDURE — 72125 CT NECK SPINE W/O DYE: CPT | Performed by: RADIOLOGY

## 2025-04-12 PROCEDURE — 70450 CT HEAD/BRAIN W/O DYE: CPT | Performed by: RADIOLOGY

## 2025-04-12 PROCEDURE — 70450 CT HEAD/BRAIN W/O DYE: CPT

## 2025-04-12 PROCEDURE — 73030 X-RAY EXAM OF SHOULDER: CPT | Mod: RT

## 2025-04-12 PROCEDURE — 73564 X-RAY EXAM KNEE 4 OR MORE: CPT | Mod: RIGHT SIDE | Performed by: RADIOLOGY

## 2025-04-12 RX ORDER — KETOROLAC TROMETHAMINE 30 MG/ML
15 INJECTION, SOLUTION INTRAMUSCULAR; INTRAVENOUS ONCE
Status: COMPLETED | OUTPATIENT
Start: 2025-04-12 | End: 2025-04-12

## 2025-04-12 RX ADMIN — KETOROLAC TROMETHAMINE 15 MG: 30 INJECTION, SOLUTION INTRAMUSCULAR at 18:53

## 2025-04-12 ASSESSMENT — PAIN - FUNCTIONAL ASSESSMENT: PAIN_FUNCTIONAL_ASSESSMENT: 0-10

## 2025-04-12 ASSESSMENT — PAIN SCALES - GENERAL: PAINLEVEL_OUTOF10: 5 - MODERATE PAIN

## 2025-04-12 NOTE — DISCHARGE INSTRUCTIONS
You are seen today for a fall, your imaging is reassuring, we will place an Ace wrap to your knee, give you Toradol, please follow-up with your primary care provider, please return the ER for worsening symptoms

## 2025-04-12 NOTE — ED PROVIDER NOTES
"HPI   Chief Complaint   Patient presents with    Fall     Pt states she turned around in her kitchen quickly causing her to fall. Pt states she hit her head twice on freezer on the way down to the floor. Pt complaining of dizziness since fall as well as right knee pain.        HPI        Patient History   Past Medical History:   Diagnosis Date    Abnormal x-ray examination 03/27/2023    Age-related nuclear cataract of left eye 05/06/2024    Age-related nuclear cataract of 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    Allergic contact dermatitis 05/06/2024    Autoimmune disorder (Multi)     Lichen Planus    Bilateral carotid artery stenosis 09/02/2023    Carotid artery disease 09/02/2023    Cervical lymphadenopathy 05/06/2024    Cheilitis 05/06/2024    Contusion 05/06/2024    Cutis laxa 05/06/2024    Delayed emergence from general anesthesia     Dermatochalasis 09/02/2023    Disorder of carotid artery 09/02/2023    Disorder of refraction 09/02/2023    Dry eye syndrome of unspecified lacrimal gland 05/17/2017    Dry eye syndrome    Dry eyes 09/02/2023    Dyslipidemia 09/02/2023    Dysphagia 05/06/2024    Essential hypertension 05/06/2024    Fall 05/06/2024    Female pelvic pain 11/14/2013    GERD (gastroesophageal reflux disease)     Heart valve disease     mitral valve prolapse with \"some\" regurgitation    History of left cataract extraction 10/04/2023    Hollenhorst plaque, 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    Hordeolum externum unspecified eye, unspecified eyelid 05/17/2017    Stye    Hyperlipidemia     Immature cataract 02/09/2021    Impaired glucose tolerance 09/02/2023    Lichen sclerosus et atrophicus 09/02/2023    Mitral valve regurgitation 09/02/2023    Nonrheumatic aortic valve stenosis 05/06/2024    Oral lichen planus 05/06/2024    Osteopenia     Osteopenia 09/02/2023    Pain around eye 02/09/2021    Pain of right breast 09/02/2023    Pain of right upper extremity 05/06/2024    Personal history of diseases of the skin and subcutaneous tissue "     History of lichen planus    Presence of intraocular lens 2024    Pseudophakia     Status post bilateral cataract extraction     Status post left cataract extraction 10/04/2023    Status post right cataract extraction 10/04/2023    Toxic effect of venom of bees, accidental (unintentional), initial encounter 2017    Bee sting reaction    Vaginal atrophy 2023    Vitamin D deficiency 2023     Past Surgical History:   Procedure Laterality Date    CATARACT EXTRACTION      bilateral cataract surgery    CHOLECYSTECTOMY      COLONOSCOPY      COLONOSCOPY      COLONOSCOPY      ESOPHAGOGASTRODUODENOSCOPY      ESOPHAGOGASTRODUODENOSCOPY      MOUTH SURGERY  2018    OOPHORECTOMY Right     OTHER SURGICAL HISTORY  10/24/2022    right ovary removed    OTHER SURGICAL HISTORY  10/24/2022    OTHER SURGICAL HISTORY  10/24/2022    Cardiac catheterization    OTHER SURGICAL HISTORY  10/24/2022    Cholecystectomy    OTHER SURGICAL HISTORY      teeth pulled under anesthesia    PARS PLANA VITRECTOMY Right         THROAT SURGERY      22     Family History   Problem Relation Name Age of Onset    Diabetes Mother      Heart disease Mother      Heart disease Father      No Known Problems Brother      Cancer Son      Breast cancer Mother's Sister       Social History     Tobacco Use    Smoking status: Former     Current packs/day: 0.00     Types: Cigarettes     Quit date: 1968     Years since quittin.3    Smokeless tobacco: Never   Vaping Use    Vaping status: Never Used   Substance Use Topics    Alcohol use: Not Currently    Drug use: Never       Physical Exam   ED Triage Vitals [25 1548]   Temperature Heart Rate Respirations BP   36.9 °C (98.4 °F) 67 15 177/70      Pulse Ox Temp src Heart Rate Source Patient Position   99 % -- -- --      BP Location FiO2 (%)     -- --       Physical Exam      ED Course & MDM   ED Course as of 25 1809   Sat 2025   1610 EKG on  interpretation shows normal sinus rhythm, rate of 62 bpm.  Normal axis.  QTc 410 ms, WA interval 162.  No ST elevation or depression, no acute ischemic pattern.  No STEMI.  Normal EKG. [NT]      ED Course User Index  [NT] Abad Marie DO                 No data recorded     [place] Coma Scale Score: 15 (04/12/25 1553 : Criss Wheat RN)                           Medical Decision Making      Procedure  Procedures   extremities.  Neuro: no focal deficits, CN2-12 intact. Sensation fully intact.  Psych: appropriate mood and affect, cooperative with exam  Skin: warm, dry, without evidence of rash or abrasions    ED Course & MDM   ED Course as of 04/14/25 1613   Sat Apr 12, 2025   1610 EKG on interpretation shows normal sinus rhythm, rate of 62 bpm.  Normal axis.  QTc 410 ms, SC interval 162.  No ST elevation or depression, no acute ischemic pattern.  No STEMI.  Normal EKG. [NT]      ED Course User Index  [NT] Abad Marie,          Diagnoses as of 04/14/25 1613   Fall, initial encounter   Acute pain of right knee                 No data recorded     [place] Coma Scale Score: 15 (04/12/25 1553 : Criss Wheat RN)                           Medical Decision Making  Patient presents the ED for evaluation of injuries after mechanical fall.  See HPI for details of how the patient fell.  The patient had no shortness of breath, dizziness, lightheadedness, chest pain or palpitations, no symptoms suggestive of arrhythmia, acute coronary syndrome, any other acute medical cause for their fall today.  No indication for extensive medical workup today.  Imaging ordered to assess for evidence of acute injury from the fall.    Physical exam shows tenderness to palpation of the occipital scalp, no laceration or bleeding.  No significant tenderness over the cervical spine.  She has mild tenderness at the right proximal humerus and around the right knee.  No obvious visible deformity.  She is moving the right upper extremity through forage motion without difficulty, she has pain with active range of motion of the right knee, and pain with abduction of the arm above about 90 degrees.  She otherwise is neurovascular intact on exam.  No other injuries identified.    Patient was treated with Toradol, CT of the head and cervical spine, x-ray of the shoulder and knee are ordered for evaluation of her injuries.    I personally reviewed the  patient's x-rays showing no acute fracture, radiologist confirms this.  Care of the patient was signed out to the oncoming physician pending results of her head and cervical spine CT.    XR knee right 4+ views   Final Result   1. No acute osseous abnormality.        Signed by: Bharath Love 4/12/2025 6:26 PM   Dictation workstation:   [id number]      XR shoulder right 2+ views   Final Result   1. No acute osseous abnormality.        Signed by: Bharath Love 4/12/2025 6:25 PM   Dictation workstation:   [id number]      CT head wo IV contrast   Final Result   1. No acute intracranial hemorrhage or mass effect.   2. No acute fracture or traumatic malalignment of the cervical spine.   3. Moderate multilevel spinal degenerative change with fusion of the   posterior elements from C3-C5 contributing to multilevel severe   neural foraminal stenosis described above.        Signed by: Bharath Love 4/12/2025 6:23 PM   Dictation workstation:   [id number]      CT cervical spine wo IV contrast   Final Result   1. No acute intracranial hemorrhage or mass effect.   2. No acute fracture or traumatic malalignment of the cervical spine.   3. Moderate multilevel spinal degenerative change with fusion of the   posterior elements from C3-C5 contributing to multilevel severe   neural foraminal stenosis described above.        Signed by: Bharath Love 4/12/2025 6:23 PM   Dictation workstation:   [id number]            Procedure  Procedures     Abad Marie DO  04/14/25 7578

## 2025-04-12 NOTE — ED PROVIDER NOTES
I assumed care of this patient at shift change  Patient's imaging was negative, patient will be given Ace wrap for her knee, as well as a dose of Toradol, patient was discharged home, return precautions discussed, patient is agreeable to discharge plan.    Diagnosis: Fall, knee pain  XR knee right 4+ views   Final Result   1. No acute osseous abnormality.        Signed by: Bharath Love 4/12/2025 6:26 PM   Dictation workstation:   [id number]      XR shoulder right 2+ views   Final Result   1. No acute osseous abnormality.        Signed by: Bharath Love 4/12/2025 6:25 PM   Dictation workstation:   [id number]      CT head wo IV contrast   Final Result   1. No acute intracranial hemorrhage or mass effect.   2. No acute fracture or traumatic malalignment of the cervical spine.   3. Moderate multilevel spinal degenerative change with fusion of the   posterior elements from C3-C5 contributing to multilevel severe   neural foraminal stenosis described above.        Signed by: Bharath Love 4/12/2025 6:23 PM   Dictation workstation:   [id number]      CT cervical spine wo IV contrast   Final Result   1. No acute intracranial hemorrhage or mass effect.   2. No acute fracture or traumatic malalignment of the cervical spine.   3. Moderate multilevel spinal degenerative change with fusion of the   posterior elements from C3-C5 contributing to multilevel severe   neural foraminal stenosis described above.        Signed by: Bharath Love 4/12/2025 6:23 PM   Dictation workstation:   [id number]        Results for orders placed or performed in visit on 11/18/24   CBC and Auto Differential    Collection Time: 11/18/24 10:55 AM   Result Value Ref Range    WBC 6.6 4.4 - 11.3 x10*3/uL    nRBC 0.0 0.0 - 0.0 /100 WBCs    RBC 4.51 4.00 - 5.20 x10*6/uL    Hemoglobin 11.9 (L) 12.0 - 16.0 g/dL    Hematocrit 39.1 36.0 - 46.0 %    MCV 87 80 - 100 fL    MCH 26.4 26.0 - 34.0 pg    MCHC 30.4 (L) 32.0 - 36.0 g/dL     RDW 14.7 (H) 11.5 - 14.5 %    Platelets 257 150 - 450 x10*3/uL    Neutrophils % 67.4 40.0 - 80.0 %    Immature Granulocytes %, Automated 0.2 0.0 - 0.9 %    Lymphocytes % 18.7 13.0 - 44.0 %    Monocytes % 9.8 2.0 - 10.0 %    Eosinophils % 3.0 0.0 - 6.0 %    Basophils % 0.9 0.0 - 2.0 %    Neutrophils Absolute 4.48 1.60 - 5.50 x10*3/uL    Immature Granulocytes Absolute, Automated 0.01 0.00 - 0.50 x10*3/uL    Lymphocytes Absolute 1.24 0.80 - 3.00 x10*3/uL    Monocytes Absolute 0.65 0.05 - 0.80 x10*3/uL    Eosinophils Absolute 0.20 0.00 - 0.40 x10*3/uL    Basophils Absolute 0.06 0.00 - 0.10 x10*3/uL   Comprehensive Metabolic Panel    Collection Time: 11/18/24 10:55 AM   Result Value Ref Range    Glucose 104 (H) 74 - 99 mg/dL    Sodium 139 136 - 145 mmol/L    Potassium 4.4 3.5 - 5.3 mmol/L    Chloride 104 98 - 107 mmol/L    Bicarbonate 30 21 - 32 mmol/L    Anion Gap 9 (L) 10 - 20 mmol/L    Urea Nitrogen 15 6 - 23 mg/dL    Creatinine 0.68 0.50 - 1.05 mg/dL    eGFR 90 >60 mL/min/1.73m*2    Calcium 9.0 8.6 - 10.3 mg/dL    Albumin 3.8 3.4 - 5.0 g/dL    Alkaline Phosphatase 60 33 - 136 U/L    Total Protein 6.9 6.4 - 8.2 g/dL    AST 20 9 - 39 U/L    Bilirubin, Total 0.7 0.0 - 1.2 mg/dL    ALT 12 7 - 45 U/L   Lipid Panel    Collection Time: 11/18/24 10:55 AM   Result Value Ref Range    Cholesterol 155 0 - 199 mg/dL    HDL-Cholesterol 45.6 mg/dL    Cholesterol/HDL Ratio 3.4     LDL Calculated 86 <=99 mg/dL    VLDL 24 0 - 40 mg/dL    Triglycerides 119 0 - 149 mg/dL    Non HDL Cholesterol 109 0 - 149 mg/dL   TSH with reflex to Free T4 if abnormal    Collection Time: 11/18/24 10:55 AM   Result Value Ref Range    Thyroid Stimulating Hormone 1.92 0.44 - 3.98 mIU/L   Vitamin D 25-Hydroxy,Total (for eval of Vitamin D levels)    Collection Time: 11/18/24 10:55 AM   Result Value Ref Range    Vitamin D, 25-Hydroxy, Total 43 30 - 100 ng/mL   Hemoglobin A1C    Collection Time: 11/18/24 10:55 AM   Result Value Ref Range    Hemoglobin A1C 5.8  (H) See comment %    Estimated Average Glucose 120 Not Established mg/dL     *Note: Due to a large number of results and/or encounters for the requested time period, some results have not been displayed. A complete set of results can be found in Results Review.         Sections of this report were created using voice-to-text technology and may contain errors in translation    Deacon Garcia DO  Emergency Medicine         Deacon Garcia DO  04/13/25 0325

## 2025-04-14 ENCOUNTER — APPOINTMENT (OUTPATIENT)
Dept: OPHTHALMOLOGY | Facility: CLINIC | Age: 75
End: 2025-04-14
Payer: MEDICARE

## 2025-04-15 ENCOUNTER — TELEPHONE (OUTPATIENT)
Dept: PRIMARY CARE | Facility: CLINIC | Age: 75
End: 2025-04-15
Payer: MEDICARE

## 2025-04-15 DIAGNOSIS — R42 DIZZINESS: ICD-10-CM

## 2025-04-15 NOTE — TELEPHONE ENCOUNTER
Left message with patient about medication to get over the counter for dizziness.  Referral information to ENT also.

## 2025-04-15 NOTE — TELEPHONE ENCOUNTER
Pt c/o off and on dizzy spells over the last couple of years. States that due to dizziness she fell over the weekend and ended up in ED. States that they believe she has vertigo. Patient states that she does not feel dizzy when she is sitting still but she gets dizzy when changing positions. Patient would like to know if there is something she can take for the dizziness? Please advise.

## 2025-04-16 ENCOUNTER — TELEPHONE (OUTPATIENT)
Dept: PRIMARY CARE | Facility: CLINIC | Age: 75
End: 2025-04-16
Payer: MEDICARE

## 2025-04-16 NOTE — TELEPHONE ENCOUNTER
Patient states that Dr. Fernandez's practice is scheduling too far out. Pt requesting name of another

## 2025-04-30 ENCOUNTER — CLINICAL SUPPORT (OUTPATIENT)
Dept: AUDIOLOGY | Facility: CLINIC | Age: 75
End: 2025-04-30
Payer: MEDICARE

## 2025-04-30 ENCOUNTER — TELEPHONE (OUTPATIENT)
Dept: AUDIOLOGY | Facility: CLINIC | Age: 75
End: 2025-04-30

## 2025-04-30 ENCOUNTER — OFFICE VISIT (OUTPATIENT)
Dept: OTOLARYNGOLOGY | Facility: CLINIC | Age: 75
End: 2025-04-30
Payer: MEDICARE

## 2025-04-30 VITALS — BODY MASS INDEX: 22.29 KG/M2 | HEIGHT: 67 IN | TEMPERATURE: 98.2 F | WEIGHT: 142 LBS

## 2025-04-30 DIAGNOSIS — R42 DIZZINESS: Primary | ICD-10-CM

## 2025-04-30 DIAGNOSIS — R42 DIZZINESS AND GIDDINESS: ICD-10-CM

## 2025-04-30 DIAGNOSIS — H90.3 SENSORINEURAL HEARING LOSS (SNHL) OF BOTH EARS: ICD-10-CM

## 2025-04-30 DIAGNOSIS — H90.3 BILATERAL SENSORINEURAL HEARING LOSS: Primary | ICD-10-CM

## 2025-04-30 DIAGNOSIS — H93.13 TINNITUS OF BOTH EARS: ICD-10-CM

## 2025-04-30 PROCEDURE — 1159F MED LIST DOCD IN RCRD: CPT

## 2025-04-30 PROCEDURE — 1036F TOBACCO NON-USER: CPT

## 2025-04-30 PROCEDURE — 92557 COMPREHENSIVE HEARING TEST: CPT | Performed by: AUDIOLOGIST

## 2025-04-30 PROCEDURE — 99213 OFFICE O/P EST LOW 20 MIN: CPT

## 2025-04-30 PROCEDURE — 92567 TYMPANOMETRY: CPT | Performed by: AUDIOLOGIST

## 2025-04-30 NOTE — PROGRESS NOTES
Chief Complaint   Patient presents with    Follow-up     Follow up  has been getting dizzy fell and hit her head- since then dizziness is worse, crunching noise in ears, neck pain, was told years ago and told she has a pinch nerve at the base of the skull     HPI:  Sally A Behanna is a 74 y.o. female is accompanied by her  Santhosh for follow-up has had some dizziness with imbalance again recently.  When questioned about her fall she felt like maybe her feet just got caught up but was not sure if she possibly had some dizziness at that time.  Denice reports went to the ED 4/12/2025 and had a normal workup no fractures.  She did not complete her vestibular therapy due to other appointments for her and her spouse.  She does report she was making progress.  Reports some tinnitus denies otalgia or otorrhea.  She does have some neck pain which was better when she was doing therapy.  Discussed today's audiogram sensorineural neural hearing loss mild to moderate, Type A tympanometry and word recognition scores were excellent at 75 dB        10/17/2024-6 weeks follow-up for her dizziness.   She had a normal VNG on September 11, 2024.   She has been doing balance therapy.   This has been helping.  Unfortunately 3 days ago her  who has Parkinson's disease fell down and took her with her.  She did break her toe and has some bruises on her lower extremity.   Had some chronic problems with dizziness and imbalance.  Does not selections having any true vertigo or spinning but more lightheaded and off-balance sensation.  Worse in areas like the grocery store if she is not using a cart.  Has some hearing loss as well.  Some bilateral ear pressure.  No pain, drainage.  She have an audiogram performed on September 5, 2024 which shows bilateral moderate symmetric sensorineural hearing loss.  She is seeing neurology and had normal MRI of the brain on May 23, 2024.  Having some trouble with vision and she may require another eye  "surgery.  Also going to have endoscopic repair of a Zenker's diverticulum.  She has started vestibular rehabilitation and has a VNG scheduled for tomorrow        PMH:  Past Medical History:   Diagnosis Date    Abnormal x-ray examination 03/27/2023    Age-related nuclear cataract of left eye 05/06/2024    Age-related nuclear cataract of 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    Allergic contact dermatitis 05/06/2024    Autoimmune disorder (Multi)     Lichen Planus    Bilateral carotid artery stenosis 09/02/2023    Carotid artery disease 09/02/2023    Cervical lymphadenopathy 05/06/2024    Cheilitis 05/06/2024    Contusion 05/06/2024    Cutis laxa 05/06/2024    Delayed emergence from general anesthesia     Dermatochalasis 09/02/2023    Disorder of carotid artery 09/02/2023    Disorder of refraction 09/02/2023    Dry eye syndrome of unspecified lacrimal gland 05/17/2017    Dry eye syndrome    Dry eyes 09/02/2023    Dyslipidemia 09/02/2023    Dysphagia 05/06/2024    Essential hypertension 05/06/2024    Fall 05/06/2024    Female pelvic pain 11/14/2013    GERD (gastroesophageal reflux disease)     Heart valve disease     mitral valve prolapse with \"some\" regurgitation    History of left cataract extraction 10/04/2023    Hollenhorst plaque, 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    Hordeolum externum unspecified eye, unspecified eyelid 05/17/2017    Stye    Hyperlipidemia     Immature cataract 02/09/2021    Impaired glucose tolerance 09/02/2023    Lichen sclerosus et atrophicus 09/02/2023    Mitral valve regurgitation 09/02/2023    Nonrheumatic aortic valve stenosis 05/06/2024    Oral lichen planus 05/06/2024    Osteopenia     Osteopenia 09/02/2023    Pain around eye 02/09/2021    Pain of right breast 09/02/2023    Pain of right upper extremity 05/06/2024    Personal history of diseases of the skin and subcutaneous tissue     History of lichen planus    Presence of intraocular lens 05/06/2024    Pseudophakia     Status post bilateral cataract extraction     " "Status post left cataract extraction 10/04/2023    Status post right cataract extraction 10/04/2023    Toxic effect of venom of bees, accidental (unintentional), initial encounter 05/17/2017    Bee sting reaction    Vaginal atrophy 09/02/2023    Vitamin D deficiency 09/02/2023     Past Surgical History:   Procedure Laterality Date    CATARACT EXTRACTION      bilateral cataract surgery    CHOLECYSTECTOMY      COLONOSCOPY  2003    COLONOSCOPY  2012    COLONOSCOPY  2014    ESOPHAGOGASTRODUODENOSCOPY  2012    ESOPHAGOGASTRODUODENOSCOPY  2014    MOUTH SURGERY  2018    OOPHORECTOMY Right     OTHER SURGICAL HISTORY  10/24/2022    right ovary removed    OTHER SURGICAL HISTORY  10/24/2022    OTHER SURGICAL HISTORY  10/24/2022    Cardiac catheterization    OTHER SURGICAL HISTORY  10/24/2022    Cholecystectomy    OTHER SURGICAL HISTORY      teeth pulled under anesthesia    PARS PLANA VITRECTOMY Right     2023    THROAT SURGERY      11/4/24         Medications:     Current Outpatient Medications:     atorvastatin (Lipitor) 80 mg tablet, Take 1 tablet (80 mg) by mouth once daily., Disp: 90 tablet, Rfl: 2    calcium carbonate-vitamin D3 600 mg-20 mcg (800 unit) tablet, Take by mouth once daily., Disp: , Rfl:     cholecalciferol (Vitamin D-3) 25 MCG (1000 UT) capsule, Take 1 capsule (25 mcg) by mouth once daily., Disp: , Rfl:     dexAMETHasone 0.5 mg/5 mL solution, SWISH AND SPIT 1/2 OZ 3 - 4 TIMES A DAY, Disp: , Rfl:     esomeprazole (NexIUM) 20 mg DR capsule, Take 2 capsules (40 mg) by mouth once daily in the morning. Take before meals., Disp: , Rfl:     propylene glycol/peg 400/PF (SYSTANE, PF, OPHT), Administer into affected eye(s) 2 times a day., Disp: , Rfl:     aspirin 81 mg EC tablet, Take 1 tablet (81 mg) by mouth once daily. (Patient not taking: Reported on 1/20/2025), Disp: , Rfl:      Allergies:  Allergies   Allergen Reactions    Mercury Other and Unknown     \"Blister\"    Acrylic Acid Unknown     Pt doesn't remember " "reaction     Amoxicillin-Pot Clavulanate Nausea Only, Other and Unknown     nauseated    Bacitracin Rash    Balsam [place] Other and Unknown     Blisters on tongue    balsm of peru    Ciprofloxacin Nausea Only and Unknown    Codeine Other and Unknown     Pt doesn't remember reaction    feels like she is floating    Gold Au 198 Rash    Gold Salts Other     \"Redness/Erythema\"    Iodinated Contrast Media Hives    Ioversol Other    Latex Swelling and Unknown    Menthol Unknown     Pt doesn't remember reaction    Rubber, Unspecified Unknown    Sulfamethoxazole-Trimethoprim Unknown     Pt doesn't remember reaction        ROS:  Review of systems normal unless stated otherwise in the HPI and/or PMH.    Physical Exam:  Temperature 36.8 °C (98.2 °F), height 1.702 m (5' 7\"), weight 64.4 kg (142 lb). Body mass index is 22.24 kg/m².     GENERAL APPEARANCE: Well developed and well nourished.  Alert and oriented in no acute distress.  Normal vocal quality.      HEAD/FACE: No erythema or edema or facial tenderness.  Normal facial nerve function bilaterally.    EAR:       EXTERNAL: Normal pinnas and external auditory canals without lesion or obstructing wax.       MIDDLE EAR: Tympanic membranes intact and mobile with normal landmarks.  Middle ear space appears well aerated.       TUBE STATUS: N/A       MASTOID CAVITY: N/A       HEARING: Gross hearing assessment is within normal limits.      NOSE:       VISUALIZED USING: Anterior rhinoscopy with headlight and nasal speculum.       DORSUM: Midline, nontraumatic appearance.       MUCOSA: Normal-appearing.       SECRETIONS: Normal.       SEPTUM: Midline and nonobstructing.       INFERIOR TURBINATES: Normal.       MIDDLE TURBINATES/MEATUS: N/A       BLEEDING: N/A         ORAL CAVITY/PHARYNX:       TEETH: Adequate dentition.       TONGUE: No mass or lesion.  Normal mobility.       FLOOR OF MOUTH: No mass or lesion.       PALATE: Normal hard palate, soft palate, and uvula.       OROPHARYNX: " Normal without mass or lesion.  Tonsils absent       BUCCAL MUCOSA/GBS: Normal without mass or lesion.       LIPS: Normal.    LARYNX/HYPOPHARYNX/NASOPHARYNX: N/A    NECK: No palpable masses or abnormal adenopathy.  Trachea is midline.    THYROID: No thyromegaly or palpable nodule.    SALIVARY GLANDS: Normal bilateral parotid and submandibular glands by inspection and palpation.    TMJ's: Normal.    NEURO: Cranial nerve exam grossly normal bilaterally.       Assessment/Plan   Denice was seen today for follow-up.  Diagnoses and all orders for this visit:  Bilateral sensorineural hearing loss (Primary)  Dizziness and giddiness  -     Referral to Physical Therapy; Future  Tinnitus of both ears    Denice and I discussed white noise for tinnitus.  Her ear exam was normal.  I placed an order for vestibular rehab.  Reports she was feeling better it seemed to be working.       No follow-ups on file.     Ora Ferreira, MEIR-CNP

## 2025-04-30 NOTE — TELEPHONE ENCOUNTER
Denice has been struggling with terrible vertigo that started approximately 2 weeks ago slightly before a fall. Per patient the fall exacerbated the dizziness which became increasingly worse. Currently Denice struggles with extreme dizziness particularly when walking, getting up out of bed, or changing positions from sitting to standing. She is also extremely nauseous.     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

## 2025-04-30 NOTE — PROGRESS NOTES
AUDIOLOGY ADULT AUDIOMETRIC EVALUATION    Name:  Sally A Behanna  :  1950  Age:  74 y.o.  Date of Evaluation:  2025    Reason for visit: Denice is seen in the clinic today at the request of otolaryngology for an audiologic evaluation.     HISTORY  Patient reports 2025, she tripped and fell in kitchen hitting right side of head.   Went to the emergency room department; CT Scan reportedly normal.   She reports she has felt significant unsteadiness.   Patient has history of sensorineural hearing loss bilaterally.     She denies any significant difficulty hearing.      EVALUATION  See scanned audiogram: “Media” > “Audiology Report”.      RESULTS  Otoscopic Evaluation:  Right Ear: clear ear canal  Left Ear: clear ear canal    Immittance Measures:  Tympanometry:  Right Ear: Type A, normal tympanic membrane mobility with normal middle ear pressure  Left Ear: Type A, normal tympanic membrane mobility with normal middle ear pressure    Audiometry:  Test Technique and Reliability:   Standard audiometry via insert phones.   Reliability is good.    Pure tone air and bone conduction audiometry:  Mild to moderate sensorineural hearing loss bilaterally    Speech Audiometry (Word Recognition Scores):   Right Ear: Excellent at most comfortable listening level of loudness of 75 dB HL  Left Ear: Excellent at most comfortable listening level of loudness of 75 dB HL    IMPRESSIONS    Stable mild to moderate sensorineural hearing loss bilaterally    RECOMMENDATIONS  - Follow up with otolaryngology today as scheduled.  - Annual audiologic evaluation, sooner if an acute change is noted.  - Hearing aid evaluation    PATIENT EDUCATION  Discussed results, impressions and recommendations with the patient. Questions were addressed and the patient was encouraged to contact our office should concerns arise.    Time for this encounter: 300/327    Charmaine Zavaleta M.A., CCC/A   Licensed Audiologist

## 2025-05-05 DIAGNOSIS — M54.2 NECK PAIN: ICD-10-CM

## 2025-05-05 DIAGNOSIS — R42 DIZZINESS AND GIDDINESS: Primary | ICD-10-CM

## 2025-05-05 NOTE — PROGRESS NOTES
Attempted to call Denice after reviewing ER CT I would like her to follow-up with Dr. Donald here at [facility],for a further workup and testing.  Left message with  to call office tomorrow. 4/5/2025 16:30.

## 2025-05-12 ENCOUNTER — APPOINTMENT (OUTPATIENT)
Dept: VASCULAR SURGERY | Facility: CLINIC | Age: 75
End: 2025-05-12
Payer: MEDICARE

## 2025-05-19 ENCOUNTER — APPOINTMENT (OUTPATIENT)
Dept: AUDIOLOGY | Facility: CLINIC | Age: 75
End: 2025-05-19
Payer: MEDICARE

## 2025-05-19 ENCOUNTER — APPOINTMENT (OUTPATIENT)
Dept: OTOLARYNGOLOGY | Facility: CLINIC | Age: 75
End: 2025-05-19
Payer: MEDICARE

## 2025-05-21 LAB
ALBUMIN SERPL-MCNC: 3.9 G/DL (ref 3.6–5.1)
ALP SERPL-CCNC: 67 U/L (ref 37–153)
ALT SERPL-CCNC: 12 U/L (ref 6–29)
ANION GAP SERPL CALCULATED.4IONS-SCNC: 8 MMOL/L (CALC) (ref 7–17)
AST SERPL-CCNC: 19 U/L (ref 10–35)
BASOPHILS # BLD AUTO: 52 CELLS/UL (ref 0–200)
BASOPHILS NFR BLD AUTO: 1.1 %
BILIRUB SERPL-MCNC: 0.5 MG/DL (ref 0.2–1.2)
BUN SERPL-MCNC: 8 MG/DL (ref 7–25)
CALCIUM SERPL-MCNC: 9.2 MG/DL (ref 8.6–10.4)
CHLORIDE SERPL-SCNC: 105 MMOL/L (ref 98–110)
CHOLEST SERPL-MCNC: 156 MG/DL
CHOLEST/HDLC SERPL: 2.8 (CALC)
CO2 SERPL-SCNC: 28 MMOL/L (ref 20–32)
CREAT SERPL-MCNC: 0.65 MG/DL (ref 0.6–1)
EGFRCR SERPLBLD CKD-EPI 2021: 91 ML/MIN/1.73M2
EOSINOPHIL # BLD AUTO: 320 CELLS/UL (ref 15–500)
EOSINOPHIL NFR BLD AUTO: 6.8 %
ERYTHROCYTE [DISTWIDTH] IN BLOOD BY AUTOMATED COUNT: 15 % (ref 11–15)
EST. AVERAGE GLUCOSE BLD GHB EST-MCNC: 131 MG/DL
EST. AVERAGE GLUCOSE BLD GHB EST-SCNC: 7.3 MMOL/L
GLUCOSE SERPL-MCNC: 104 MG/DL (ref 65–99)
HBA1C MFR BLD: 6.2 %
HCT VFR BLD AUTO: 36.2 % (ref 35–45)
HDLC SERPL-MCNC: 55 MG/DL
HGB BLD-MCNC: 10.9 G/DL (ref 11.7–15.5)
LDLC SERPL CALC-MCNC: 85 MG/DL (CALC)
LYMPHOCYTES # BLD AUTO: 1495 CELLS/UL (ref 850–3900)
LYMPHOCYTES NFR BLD AUTO: 31.8 %
MCH RBC QN AUTO: 25.9 PG (ref 27–33)
MCHC RBC AUTO-ENTMCNC: 30.1 G/DL (ref 32–36)
MCV RBC AUTO: 86 FL (ref 80–100)
MONOCYTES # BLD AUTO: 526 CELLS/UL (ref 200–950)
MONOCYTES NFR BLD AUTO: 11.2 %
NEUTROPHILS # BLD AUTO: 2308 CELLS/UL (ref 1500–7800)
NEUTROPHILS NFR BLD AUTO: 49.1 %
NONHDLC SERPL-MCNC: 101 MG/DL (CALC)
PLATELET # BLD AUTO: 231 THOUSAND/UL (ref 140–400)
PMV BLD REES-ECKER: 10.7 FL (ref 7.5–12.5)
POTASSIUM SERPL-SCNC: 4.4 MMOL/L (ref 3.5–5.3)
PROT SERPL-MCNC: 6.7 G/DL (ref 6.1–8.1)
RBC # BLD AUTO: 4.21 MILLION/UL (ref 3.8–5.1)
SODIUM SERPL-SCNC: 141 MMOL/L (ref 135–146)
TRIGL SERPL-MCNC: 73 MG/DL
TSH SERPL-ACNC: 1.81 MIU/L (ref 0.4–4.5)
WBC # BLD AUTO: 4.7 THOUSAND/UL (ref 3.8–10.8)

## 2025-05-23 ENCOUNTER — APPOINTMENT (OUTPATIENT)
Dept: PRIMARY CARE | Facility: CLINIC | Age: 75
End: 2025-05-23
Payer: MEDICARE

## 2025-05-27 ENCOUNTER — OFFICE VISIT (OUTPATIENT)
Dept: GASTROENTEROLOGY | Facility: CLINIC | Age: 75
End: 2025-05-27
Payer: MEDICARE

## 2025-05-27 VITALS
HEART RATE: 65 BPM | WEIGHT: 141 LBS | HEIGHT: 67 IN | TEMPERATURE: 98.2 F | SYSTOLIC BLOOD PRESSURE: 134 MMHG | BODY MASS INDEX: 22.13 KG/M2 | DIASTOLIC BLOOD PRESSURE: 79 MMHG

## 2025-05-27 DIAGNOSIS — K22.5 ZENKER DIVERTICULUM: Primary | ICD-10-CM

## 2025-05-27 PROCEDURE — 1036F TOBACCO NON-USER: CPT | Performed by: INTERNAL MEDICINE

## 2025-05-27 PROCEDURE — 99212 OFFICE O/P EST SF 10 MIN: CPT | Performed by: INTERNAL MEDICINE

## 2025-05-27 PROCEDURE — 1159F MED LIST DOCD IN RCRD: CPT | Performed by: INTERNAL MEDICINE

## 2025-05-27 PROCEDURE — 1160F RVW MEDS BY RX/DR IN RCRD: CPT | Performed by: INTERNAL MEDICINE

## 2025-05-27 RX ORDER — IBUPROFEN 800 MG/1
800 TABLET, FILM COATED ORAL EVERY 8 HOURS PRN
COMMUNITY

## 2025-05-27 NOTE — PROGRESS NOTES
Subjective   Patient ID: Sally A Behanna is a 74 y.o. female.  She had an endoscopic Zenker's myotomy in November 2024 and here for follow up.      Here in follow up of ZD myotomy  So far she's doing extremely well with only one episode dysphagia last year  No dysphagia this year    Last colonoscopy last year and no further screening needed     She has a painful nodule in the left posterior cervical muscles and Dr. Caicedo has ordered an MRI        Review of Systems    Objective   Physical Exam  Constitutional:       Appearance: Normal appearance. She is normal weight.   Neck:      Comments: Small painful hard nodule on the left posterior cervical muscle   Neurological:      Mental Status: She is alert.         Assessment/Plan   Diagnoses and all orders for this visit:  Zenker diverticulum    Please return as needed for any GI issues  I am happy to review your upcoming MRI if there is a referral that is needed     Gabino Freedman, DO

## 2025-06-18 ENCOUNTER — APPOINTMENT (OUTPATIENT)
Dept: PHYSICAL THERAPY | Facility: CLINIC | Age: 75
End: 2025-06-18
Payer: MEDICARE

## 2025-06-27 ENCOUNTER — APPOINTMENT (OUTPATIENT)
Dept: PRIMARY CARE | Facility: CLINIC | Age: 75
End: 2025-06-27
Payer: MEDICARE

## 2025-07-01 ENCOUNTER — APPOINTMENT (OUTPATIENT)
Dept: OTOLARYNGOLOGY | Facility: CLINIC | Age: 75
End: 2025-07-01
Payer: MEDICARE

## 2025-07-01 ENCOUNTER — APPOINTMENT (OUTPATIENT)
Dept: AUDIOLOGY | Facility: CLINIC | Age: 75
End: 2025-07-01
Payer: MEDICARE

## 2025-07-11 ENCOUNTER — OFFICE VISIT (OUTPATIENT)
Dept: PRIMARY CARE | Facility: CLINIC | Age: 75
End: 2025-07-11
Payer: MEDICARE

## 2025-07-11 VITALS
BODY MASS INDEX: 22.44 KG/M2 | HEART RATE: 65 BPM | DIASTOLIC BLOOD PRESSURE: 62 MMHG | HEIGHT: 67 IN | SYSTOLIC BLOOD PRESSURE: 134 MMHG | WEIGHT: 143 LBS | TEMPERATURE: 97.3 F | OXYGEN SATURATION: 97 %

## 2025-07-11 DIAGNOSIS — M81.0 AGE-RELATED OSTEOPOROSIS WITHOUT CURRENT PATHOLOGICAL FRACTURE: ICD-10-CM

## 2025-07-11 DIAGNOSIS — K21.9 GASTROESOPHAGEAL REFLUX DISEASE WITHOUT ESOPHAGITIS: ICD-10-CM

## 2025-07-11 DIAGNOSIS — R73.03 PREDIABETES: ICD-10-CM

## 2025-07-11 DIAGNOSIS — I65.23 BILATERAL CAROTID ARTERY STENOSIS: ICD-10-CM

## 2025-07-11 DIAGNOSIS — Z12.31 OTHER SCREENING MAMMOGRAM: ICD-10-CM

## 2025-07-11 DIAGNOSIS — D64.9 ANEMIA, UNSPECIFIED TYPE: ICD-10-CM

## 2025-07-11 DIAGNOSIS — E55.9 VITAMIN D DEFICIENCY: ICD-10-CM

## 2025-07-11 DIAGNOSIS — Z00.00 ANNUAL PHYSICAL EXAM: Primary | ICD-10-CM

## 2025-07-11 DIAGNOSIS — R73.9 HYPERGLYCEMIA: ICD-10-CM

## 2025-07-11 DIAGNOSIS — E78.2 MIXED HYPERLIPIDEMIA: ICD-10-CM

## 2025-07-11 DIAGNOSIS — M85.80 OSTEOPENIA, UNSPECIFIED LOCATION: ICD-10-CM

## 2025-07-11 PROCEDURE — 1126F AMNT PAIN NOTED NONE PRSNT: CPT | Performed by: INTERNAL MEDICINE

## 2025-07-11 PROCEDURE — G0439 PPPS, SUBSEQ VISIT: HCPCS | Performed by: INTERNAL MEDICINE

## 2025-07-11 PROCEDURE — 99213 OFFICE O/P EST LOW 20 MIN: CPT | Performed by: INTERNAL MEDICINE

## 2025-07-11 PROCEDURE — 1160F RVW MEDS BY RX/DR IN RCRD: CPT | Performed by: INTERNAL MEDICINE

## 2025-07-11 PROCEDURE — 99213 OFFICE O/P EST LOW 20 MIN: CPT | Mod: 25 | Performed by: INTERNAL MEDICINE

## 2025-07-11 PROCEDURE — 1159F MED LIST DOCD IN RCRD: CPT | Performed by: INTERNAL MEDICINE

## 2025-07-11 PROCEDURE — 99215 OFFICE O/P EST HI 40 MIN: CPT | Performed by: INTERNAL MEDICINE

## 2025-07-11 PROCEDURE — 1124F ACP DISCUSS-NO DSCNMKR DOCD: CPT | Performed by: INTERNAL MEDICINE

## 2025-07-11 ASSESSMENT — PAIN SCALES - GENERAL: PAINLEVEL_OUTOF10: 0-NO PAIN

## 2025-07-11 ASSESSMENT — ENCOUNTER SYMPTOMS
CONSTITUTIONAL NEGATIVE: 1
RESPIRATORY NEGATIVE: 1
DEPRESSION: 0
CARDIOVASCULAR NEGATIVE: 1
OCCASIONAL FEELINGS OF UNSTEADINESS: 0
GASTROINTESTINAL NEGATIVE: 1
PSYCHIATRIC NEGATIVE: 1
LOSS OF SENSATION IN FEET: 0
ENDOCRINE NEGATIVE: 1
ARTHRALGIAS: 1
NEUROLOGICAL NEGATIVE: 1
ALLERGIC/IMMUNOLOGIC NEGATIVE: 1
EYES NEGATIVE: 1
HEMATOLOGIC/LYMPHATIC NEGATIVE: 1

## 2025-07-11 NOTE — PROGRESS NOTES
[facility]: [facility]  MEDICARE WELLNESS EXAM      Sally A Behanna is a 74 y.o. female that is presenting today for Annual Exam (CPE).    Assessment/Plan    Diagnoses and all orders for this visit:  Annual physical exam  Vitamin D deficiency  -     Vitamin D 25-Hydroxy,Total (for eval of Vitamin D levels); Future  Mixed hyperlipidemia  -     Comprehensive Metabolic Panel; Future  -     Lipid Panel; Future  -     TSH with reflex to Free T4 if abnormal; Future  Gastroesophageal reflux disease without esophagitis  Osteopenia, unspecified location  Prediabetes  Age-related osteoporosis without current pathological fracture  Bilateral carotid artery stenosis  Hyperglycemia  -     Hemoglobin A1C; Future  Other screening mammogram  -     BI mammo bilateral screening tomosynthesis; Future  Anemia, unspecified type  -     CBC and Auto Differential; Future  -     Iron and TIBC; Future  -     Ferritin; Future  -     Vitamin B12; Future  -     Folate; Future  -     CBC and Auto Differential; Future  Other orders  -     Follow Up In Primary Care - Medicare Annual  -     Follow Up In Primary Care - Established; Future  We completed the medicare wellness exam today.  The lifestyle is reviewed and recommendations for diet and exercise are made. We reviewed the immunizations and cancer screening and recommendations for needed immunizations and screenings are made.  The patient is independent in all ADL, shows no clinical signs of cognitive impairment, and is not falling or at risk for such.     Also sees Dr. Caicedo, Dr Freedman, and kerry santos in ENT; also dr frye in ophth. Also Dr. Sierra and Dr. Sharp and vacular doctor (Dr. Kang)    Terms of her annual wellness elements:  1.  Advanced directives-she does not have a power of  for healthcare  2.  Cancer screenings-should be due for mammogram in February 2026  3.  Immunizations-she done pretty well in the space but she actually never got  her second Shingrix and needs that.  On the good side she had Pneumovax in 2024 RSV vaccine in 2024 and Tdap in 2019.    We took some time to evaluate and manage her chronic medical problems as well.  We reviewed her most recent testing and including a report that she brings from [facility] orthopedics where she had an MRI which shows some cervical spondylosis with marked facet disease at several levels.    1.  Hyperlipidemia doing well on high dose of Lipitor 80 mg daily which she will continue as is  2.  History of carotid artery stenosis-most recent carotid ultrasound earlier this year show 50 to 70% stenosis of the right carotid artery she has been keeping regular vascular follow-up  3.  Cervical disc disease/spondylosis-she will keep follow-up with Dr. Jolley  4.  History of acid reflux doing well on her omeprazole.  5.  History of osteopenia with bone density test in August 2024-next bone density test will be due until August 2026.  6.  Anemia we reviewed her chart and she interestingly has a new anemia with a hemoglobin of 10.9.  We will need to recheck this today.    I will plan on seeing her back in 6 months  ADVANCED CARE PLANNING  Advanced Care Planning was discussed with patient:  The patient does not have an advanced care plan on file. The patient does not have an active surrogate decision-maker on file.  Encouraged the patient to confirm that Living Will and Healthcare Power of  (HCPoA) are accurate and up to date.  Encouraged the patient to confirm that our office be provided a copy of any documentation in the event that anything changes.    ACTIVITIES OF DAILY LIVING  Basic ADLs:  Bathing: Independent, Dressing: Independent, Toileting: Independent, Transferring: Independent, Continence: Independent, Feeding: Independent.    Instrumental ADLs:  Ability to use phone: Independent, Shopping: Independent, Cooking: Independent, House-keeping: Independent, Laundry: Independent, Transportation:  Independent, Medication Management: Independent, Finance Management: Independent.    Subjective   75 yo here for annual [facility] visit and follow up of her chronic medical issues .    Pt having a lot of neck pain extending up to the back of the head - she was to see dr walton who found severe cervical issues - she has an upcoming appt with a surgeon as well.      Review of Systems   Constitutional: Negative.    HENT: Negative.     Eyes: Negative.    Respiratory: Negative.     Cardiovascular: Negative.    Gastrointestinal: Negative.    Endocrine: Negative.    Genitourinary: Negative.    Musculoskeletal:  Positive for arthralgias.        Neck pain   Skin: Negative.    Allergic/Immunologic: Negative.    Neurological: Negative.    Hematological: Negative.    Psychiatric/Behavioral: Negative.     All other systems reviewed and are negative.    Objective   Vitals:    07/11/25 1638   BP: 134/62   Pulse: 65   Temp: 36.3 °C (97.3 °F)   SpO2: 97%      Body mass index is 22.4 kg/m².  Physical Exam  Vitals and nursing note reviewed.   Constitutional:       General: She is not in acute distress.     Appearance: Normal appearance. She is not ill-appearing.   HENT:      Head: Normocephalic and atraumatic.      Right Ear: Hearing, tympanic membrane, ear canal and external ear normal. There is no impacted cerumen.      Left Ear: Hearing, tympanic membrane, ear canal and external ear normal. There is no impacted cerumen.      Nose: Nose normal.      Mouth/Throat:      Mouth: Mucous membranes are moist.      Pharynx: Oropharynx is clear. No oropharyngeal exudate or posterior oropharyngeal erythema.   Eyes:      General: No scleral icterus.        Right eye: No discharge.         Left eye: No discharge.      Extraocular Movements: Extraocular movements intact.      Conjunctiva/sclera: Conjunctivae normal.      Pupils: Pupils are equal, round, and reactive to light.   Neck:      Vascular: No carotid bruit.   Cardiovascular:      Rate  and Rhythm: Normal rate and regular rhythm.      Pulses: Normal pulses.      Heart sounds: Normal heart sounds. No murmur heard.     No friction rub. No gallop.   Pulmonary:      Effort: Pulmonary effort is normal. No respiratory distress.      Breath sounds: Normal breath sounds.   Abdominal:      General: Abdomen is flat. Bowel sounds are normal. There is no distension.      Palpations: Abdomen is soft.      Tenderness: There is no abdominal tenderness.      Hernia: No hernia is present.   Musculoskeletal:         General: No swelling or tenderness. Normal range of motion.   Lymphadenopathy:      Cervical: No cervical adenopathy.   Skin:     General: Skin is warm and dry.      Capillary Refill: Capillary refill takes less than 2 seconds.      Coloration: Skin is not jaundiced.      Findings: No rash.   Neurological:      General: No focal deficit present.      Mental Status: She is alert and oriented to person, place, and time. Mental status is at baseline.   Psychiatric:         Mood and Affect: Mood normal.         Behavior: Behavior normal.       Diagnostic Results   Lab Results   Component Value Date    GLUCOSE 104 (H) 05/20/2025    CALCIUM 9.2 05/20/2025     05/20/2025    K 4.4 05/20/2025    CO2 28 05/20/2025     05/20/2025    BUN 8 05/20/2025    CREATININE 0.65 05/20/2025     Lab Results   Component Value Date    ALT 12 05/20/2025    AST 19 05/20/2025    ALKPHOS 67 05/20/2025    BILITOT 0.5 05/20/2025     Lab Results   Component Value Date    WBC 4.7 05/20/2025    HGB 10.9 (L) 05/20/2025    HCT 36.2 05/20/2025    MCV 86.0 05/20/2025     05/20/2025     Lab Results   Component Value Date    CHOL 156 05/20/2025    CHOL 155 11/18/2024    CHOL 166 05/06/2024     Lab Results   Component Value Date    HDL 55 05/20/2025    HDL 45.6 11/18/2024    HDL 48.0 (L) 05/06/2024     Lab Results   Component Value Date    LDLCALC 85 05/20/2025    LDLCALC 86 11/18/2024    LDLCALC 96 05/06/2024     Lab Results  "  Component Value Date    TRIG 73 2025    TRIG 119 2024    TRIG 111 2024     No components found for: \"CHOLHDL\"  Lab Results   Component Value Date    HGBA1C 6.2 (H) 2025     Other labs not included in the list above reviewed either before or during this encounter.    History   Medical History[1]  Surgical History[2]  Family History[3]  Social History     Socioeconomic History    Marital status:      Spouse name: Not on file    Number of children: Not on file    Years of education: Not on file    Highest education level: Not on file   Occupational History    Not on file   Tobacco Use    Smoking status: Former     Current packs/day: 0.00     Types: Cigarettes     Quit date:      Years since quittin.5     Passive exposure: Past    Smokeless tobacco: Never   Vaping Use    Vaping status: Never Used   Substance and Sexual Activity    Alcohol use: Not Currently    Drug use: Never    Sexual activity: Defer   Other Topics Concern    Not on file   Social History Narrative    Not on file     Social Drivers of Health     Financial Resource Strain: Not on file   Food Insecurity: Not on file   Transportation Needs: Not on file   Physical Activity: Not on file   Stress: Not on file   Social Connections: Not on file   Intimate Partner Violence: Not on file   Housing Stability: Not on file     Allergies[4]  Medications Ordered Prior to Encounter[5]  Immunization History   Administered Date(s) Administered    COVID-19, mRNA, LNP-S, PF, 30 mcg/0.3 mL dose 2021, 2021    DT (pediatric) 2006    Flu vaccine, quadrivalent, high-dose, preservative free, age 65y+ (FLUZONE) 2020, 2021, 10/26/2022, 10/27/2023    Flu vaccine, trivalent, preservative free, HIGH-DOSE, age 65y+ (Fluzone) 10/06/2016, 2018, 2019, 2020, 2021, 2024    Influenza, injectable, quadrivalent 10/10/2014, 2015, 2017    Influenza, seasonal, injectable 10/28/2011, " "10/19/2012, 11/01/2013    Pneumococcal conjugate vaccine, 13-valent (PREVNAR 13) 05/05/2017    Pneumococcal conjugate vaccine, 20-valent (PREVNAR 20) 05/09/2024    Pneumococcal polysaccharide vaccine, 23-valent, age 2 years and older (PNEUMOVAX 23) 11/27/2013, 01/08/2021    RSV, 60 Years And Older (AREXVY) 11/26/2024    Tdap vaccine, age 7 year and older (BOOSTRIX, ADACEL) 10/06/2016, 07/03/2019    Zoster vaccine, recombinant, adult (SHINGRIX) 06/15/2022    Zoster, live 02/07/2011     Patient's medical history was reviewed and updated either before or during this encounter.     Gabino Mayorga MD       [1]   Past Medical History:  Diagnosis Date    Abnormal x-ray examination 03/27/2023    Age-related nuclear cataract of left eye 05/06/2024    Age-related nuclear cataract of 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    Allergic contact dermatitis 05/06/2024    Autoimmune disorder (Multi)     Lichen Planus    Bilateral carotid artery stenosis 09/02/2023    Carotid artery disease 09/02/2023    Cervical lymphadenopathy 05/06/2024    Cheilitis 05/06/2024    Contusion 05/06/2024    Cutis laxa 05/06/2024    Delayed emergence from general anesthesia     Dermatochalasis 09/02/2023    Disorder of carotid artery 09/02/2023    Disorder of refraction 09/02/2023    Dry eye syndrome of unspecified lacrimal gland 05/17/2017    Dry eye syndrome    Dry eyes 09/02/2023    Dyslipidemia 09/02/2023    Dysphagia 05/06/2024    Essential hypertension 05/06/2024    Fall 05/06/2024    Female pelvic pain 11/14/2013    GERD (gastroesophageal reflux disease)     Heart valve disease     mitral valve prolapse with \"some\" regurgitation    History of left cataract extraction 10/04/2023    Hollenhorst plaque, 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    Hordeolum externum unspecified eye, unspecified eyelid 05/17/2017    Stye    Hyperlipidemia     Immature cataract 02/09/2021    Impaired glucose tolerance 09/02/2023    Lichen sclerosus et atrophicus 09/02/2023    Mitral valve regurgitation " "09/02/2023    Nonrheumatic aortic valve stenosis 05/06/2024    Oral lichen planus 05/06/2024    Osteopenia     Osteopenia 09/02/2023    Pain around eye 02/09/2021    Pain of right breast 09/02/2023    Pain of right upper extremity 05/06/2024    Personal history of diseases of the skin and subcutaneous tissue     History of lichen planus    Presence of intraocular lens 05/06/2024    Pseudophakia     Status post bilateral cataract extraction     Status post left cataract extraction 10/04/2023    Status post right cataract extraction 10/04/2023    Toxic effect of venom of bees, accidental (unintentional), initial encounter 05/17/2017    Bee sting reaction    Vaginal atrophy 09/02/2023    Vitamin D deficiency 09/02/2023   [2]   Past Surgical History:  Procedure Laterality Date    CATARACT EXTRACTION      bilateral cataract surgery    CHOLECYSTECTOMY      COLONOSCOPY  2003    COLONOSCOPY  2012    COLONOSCOPY  2014    ESOPHAGOGASTRODUODENOSCOPY  2012    ESOPHAGOGASTRODUODENOSCOPY  2014    MOUTH SURGERY  2018    OOPHORECTOMY Right     OTHER SURGICAL HISTORY  10/24/2022    right ovary removed    OTHER SURGICAL HISTORY  10/24/2022    OTHER SURGICAL HISTORY  10/24/2022    Cardiac catheterization    OTHER SURGICAL HISTORY  10/24/2022    Cholecystectomy    OTHER SURGICAL HISTORY      teeth pulled under anesthesia    PARS PLANA VITRECTOMY Right     2023    THROAT SURGERY      11/4/24   [3]   Family History  Problem Relation Name Age of Onset    Diabetes Mother      Heart disease Mother      Heart disease Father      No Known Problems Brother      Cancer Son      Breast cancer Mother's Sister     [4]   Allergies  Allergen Reactions    Mercury Other and Unknown     \"Blister\"    Acrylic Acid Unknown     Pt doesn't remember reaction     Amoxicillin-Pot Clavulanate Nausea Only, Other and Unknown     Nauseated Augmition    Bacitracin Rash    Balsam [place] Other and Unknown     Blisters on tongue    balsm of peru    Ciprofloxacin Nausea " "Only and Unknown    Codeine Other and Unknown     Pt doesn't remember reaction    feels like she is floating    Gold Au 198 Rash    Gold Salts Other     \"Redness/Erythema\"    Iodinated Contrast Media Hives    Ioversol Other    Latex Swelling and Unknown    Menthol Unknown     Pt doesn't remember reaction    Rubber, Unspecified Unknown    Sulfamethoxazole-Trimethoprim Unknown     Pt doesn't remember reaction   [5]   Current Outpatient Medications on File Prior to Visit   Medication Sig Dispense Refill    aspirin 81 mg EC tablet Take 1 tablet (81 mg) by mouth once daily.      atorvastatin (Lipitor) 80 mg tablet Take 1 tablet (80 mg) by mouth once daily. 90 tablet 2    BIOTIN ORAL Take 1 mg by mouth.      calcium carbonate-vitamin D3 600 mg-20 mcg (800 unit) tablet Take by mouth once daily.      cholecalciferol (Vitamin D-3) 25 MCG (1000 UT) capsule Take 1 capsule (25 mcg) by mouth once daily.      dexAMETHasone 0.5 mg/5 mL solution SWISH AND SPIT 1/2 OZ 3 - 4 TIMES A DAY      esomeprazole (NexIUM) 20 mg DR capsule Take 2 capsules (40 mg) by mouth once daily in the morning. Take before meals.      ibuprofen 800 mg tablet Take 1 tablet (800 mg) by mouth every 8 hours if needed for mild pain (1 - 3).      propylene glycol/peg 400/PF (SYSTANE, PF, OPHT) Administer into affected eye(s) 2 times a day.       No current facility-administered medications on file prior to visit.     "

## 2025-07-11 NOTE — PATIENT INSTRUCTIONS
I am glad we had a chance to see you for your wellness visit in follow-up today.  Hears a few of the things that we talked about  1.  Advanced directives in [place] you are allowed to have a advanced directive I know you do not have a power of  right now if you were to get those documents please let us know and we will scanned those into your record  2.  Cancer screenings-no next be due for mammogram in February 2026  3.  Immunizations you could benefit from an additional shingles vaccine as we discussed.  4.  History of high cholesterol-your levels are adequate right now with the Lipitor 80 mg daily which she will continue as is  5.  History of carotid artery stenosis please keep your follow-up with vascular  6.  History of acid reflux you can continue your omeprazole at current dosage  7.  History of osteopenia your next bone density test will be due in August 2026  8.  Anemia this is a relatively new finding for you today-I like you to repeat a blood test so we can see where you are with that.    I will plan on seeing you back in 6 months unless you should need me sooner I will continue to watch for notes from your specialists

## 2025-07-12 LAB
BASOPHILS # BLD AUTO: 52 CELLS/UL (ref 0–200)
BASOPHILS NFR BLD AUTO: 0.7 %
EOSINOPHIL # BLD AUTO: 192 CELLS/UL (ref 15–500)
EOSINOPHIL NFR BLD AUTO: 2.6 %
ERYTHROCYTE [DISTWIDTH] IN BLOOD BY AUTOMATED COUNT: 15.3 % (ref 11–15)
FERRITIN SERPL-MCNC: 7 NG/ML (ref 16–288)
FOLATE SERPL-MCNC: 10.9 NG/ML
HCT VFR BLD AUTO: 37.2 % (ref 35–45)
HGB BLD-MCNC: 11.5 G/DL (ref 11.7–15.5)
IRON SATN MFR SERPL: 12 % (CALC) (ref 16–45)
IRON SERPL-MCNC: 40 MCG/DL (ref 45–160)
LYMPHOCYTES # BLD AUTO: 2190 CELLS/UL (ref 850–3900)
LYMPHOCYTES NFR BLD AUTO: 29.6 %
MCH RBC QN AUTO: 27.1 PG (ref 27–33)
MCHC RBC AUTO-ENTMCNC: 30.9 G/DL (ref 32–36)
MCV RBC AUTO: 87.5 FL (ref 80–100)
MONOCYTES # BLD AUTO: 718 CELLS/UL (ref 200–950)
MONOCYTES NFR BLD AUTO: 9.7 %
NEUTROPHILS # BLD AUTO: 4248 CELLS/UL (ref 1500–7800)
NEUTROPHILS NFR BLD AUTO: 57.4 %
PLATELET # BLD AUTO: 236 THOUSAND/UL (ref 140–400)
PMV BLD REES-ECKER: 11 FL (ref 7.5–12.5)
RBC # BLD AUTO: 4.25 MILLION/UL (ref 3.8–5.1)
TIBC SERPL-MCNC: 334 MCG/DL (CALC) (ref 250–450)
VIT B12 SERPL-MCNC: 492 PG/ML (ref 200–1100)
WBC # BLD AUTO: 7.4 THOUSAND/UL (ref 3.8–10.8)

## 2025-07-14 ENCOUNTER — TELEPHONE (OUTPATIENT)
Dept: OPHTHALMOLOGY | Facility: CLINIC | Age: 75
End: 2025-07-14
Payer: MEDICARE

## 2025-07-16 ENCOUNTER — TELEPHONE (OUTPATIENT)
Dept: PRIMARY CARE | Facility: CLINIC | Age: 75
End: 2025-07-16
Payer: MEDICARE

## 2025-07-16 DIAGNOSIS — D50.9 IRON DEFICIENCY ANEMIA, UNSPECIFIED IRON DEFICIENCY ANEMIA TYPE: ICD-10-CM

## 2025-07-21 ENCOUNTER — OFFICE VISIT (OUTPATIENT)
Dept: CARDIOLOGY | Facility: CLINIC | Age: 75
End: 2025-07-21
Payer: MEDICARE

## 2025-07-21 VITALS
OXYGEN SATURATION: 99 % | BODY MASS INDEX: 21.5 KG/M2 | SYSTOLIC BLOOD PRESSURE: 118 MMHG | DIASTOLIC BLOOD PRESSURE: 70 MMHG | HEART RATE: 68 BPM | HEIGHT: 67 IN | WEIGHT: 137 LBS

## 2025-07-21 DIAGNOSIS — I35.0 NONRHEUMATIC AORTIC VALVE STENOSIS: ICD-10-CM

## 2025-07-21 DIAGNOSIS — I77.9 CAROTID ARTERY DISEASE, UNSPECIFIED LATERALITY, UNSPECIFIED TYPE: ICD-10-CM

## 2025-07-21 DIAGNOSIS — I65.23 BILATERAL CAROTID ARTERY STENOSIS: Primary | ICD-10-CM

## 2025-07-21 PROCEDURE — 1159F MED LIST DOCD IN RCRD: CPT | Performed by: INTERNAL MEDICINE

## 2025-07-21 PROCEDURE — 99213 OFFICE O/P EST LOW 20 MIN: CPT | Performed by: INTERNAL MEDICINE

## 2025-07-21 PROCEDURE — 1126F AMNT PAIN NOTED NONE PRSNT: CPT | Performed by: INTERNAL MEDICINE

## 2025-07-21 PROCEDURE — 99212 OFFICE O/P EST SF 10 MIN: CPT

## 2025-07-21 RX ORDER — EZETIMIBE 10 MG/1
10 TABLET ORAL DAILY
Qty: 30 TABLET | Refills: 11 | Status: SHIPPED | OUTPATIENT
Start: 2025-07-21 | End: 2026-07-21

## 2025-07-21 ASSESSMENT — ENCOUNTER SYMPTOMS
WEIGHT LOSS: 0
SHORTNESS OF BREATH: 0
FEVER: 0
WHEEZING: 0
COUGH: 0
NEAR-SYNCOPE: 0
OCCASIONAL FEELINGS OF UNSTEADINESS: 1
WEAKNESS: 0
DIZZINESS: 0
LOSS OF SENSATION IN FEET: 0
IRREGULAR HEARTBEAT: 0
DYSPNEA ON EXERTION: 0
PALPITATIONS: 0
CLAUDICATION: 0
DEPRESSION: 0
SYNCOPE: 0
PND: 0
ORTHOPNEA: 0
WEIGHT GAIN: 0
DIAPHORESIS: 0
MYALGIAS: 0

## 2025-07-21 ASSESSMENT — PAIN SCALES - GENERAL: PAINLEVEL_OUTOF10: 0-NO PAIN

## 2025-07-21 ASSESSMENT — LIFESTYLE VARIABLES
HOW MANY STANDARD DRINKS CONTAINING ALCOHOL DO YOU HAVE ON A TYPICAL DAY: PATIENT DOES NOT DRINK
HOW OFTEN DO YOU HAVE A DRINK CONTAINING ALCOHOL: NEVER
SKIP TO QUESTIONS 9-10: 1
HOW OFTEN DO YOU HAVE SIX OR MORE DRINKS ON ONE OCCASION: NEVER
AUDIT-C TOTAL SCORE: 0

## 2025-07-21 NOTE — ASSESSMENT & PLAN NOTE
Stable asymptomatic. Results of carotid US discussed with her. Went over lipids, would like to add zetia to her atorvastatin 80mg

## 2025-07-21 NOTE — PROGRESS NOTES
Subjective      Chief Complaint   Patient presents with    Follow-up     Patient presents to the office today for a 6 month follow up visit.           74-year-old female with hypertension, hyperlipidemia and evidence atherosclerotic disease (Hollenhorst plaque). She presents for follow-up. She has mild Aortic stenosis with her last echo 2024 showing a peak velocity of 252cm/s, mean grd 14mmHg. She also has a history of carotid artery stenosis and had followed regularly with [facility], 50-60% JA stenosis, mild LICA stenosis.  When I saw her last we arranged for repeat carotid ultrasound which shows stable carotid disease         Review of Systems   Constitutional: Negative for diaphoresis, fever, weight gain and weight loss.   Eyes:  Negative for visual disturbance.   Cardiovascular:  Negative for chest pain, claudication, dyspnea on exertion, irregular heartbeat, leg swelling, near-syncope, orthopnea, palpitations, paroxysmal nocturnal dyspnea and syncope.   Respiratory:  Negative for cough, shortness of breath and wheezing.    Musculoskeletal:  Negative for muscle weakness and myalgias.   Neurological:  Negative for dizziness and weakness.   All other systems reviewed and are negative.       Medical History[1]     Surgical History[2]     Social History     Socioeconomic History    Marital status:      Spouse name: Not on file    Number of children: Not on file    Years of education: Not on file    Highest education level: Not on file   Occupational History    Not on file   Tobacco Use    Smoking status: Former     Current packs/day: 0.00     Types: Cigarettes     Quit date:      Years since quittin.5     Passive exposure: Past    Smokeless tobacco: Never   Vaping Use    Vaping status: Never Used   Substance and Sexual Activity    Alcohol use: Not Currently    Drug use: Never    Sexual activity: Defer   Other Topics Concern    Not on file   Social History Narrative    Not on file     Social  Drivers of Health     Financial Resource Strain: Not on file   Food Insecurity: Not on file   Transportation Needs: Not on file   Physical Activity: Not on file   Stress: Not on file   Social Connections: Not on file   Intimate Partner Violence: Not on file   Housing Stability: Not on file        Family History[3]     OBJECTIVE:    Vitals:    07/21/25 1052   BP: 118/70   Pulse: 68   SpO2: 99%        Vitals reviewed.   Constitutional:       Appearance: Normal and healthy appearance. Not in distress.   Pulmonary:      Effort: Pulmonary effort is normal.      Breath sounds: Normal breath sounds.   Cardiovascular:      Normal rate. Regular rhythm. Normal S1. Normal S2.       Murmurs: There is no murmur.      No gallop.  No click.   Pulses:     Intact distal pulses.   Edema:     Peripheral edema absent.   Skin:     General: Skin is warm and dry.   Neurological:      General: No focal deficit present.        Lab Review:   Lab Results   Component Value Date     05/20/2025    K 4.4 05/20/2025     05/20/2025    CO2 28 05/20/2025    BUN 8 05/20/2025    CREATININE 0.65 05/20/2025    GLUCOSE 104 (H) 05/20/2025    CALCIUM 9.2 05/20/2025     Lab Results   Component Value Date    CHOL 156 05/20/2025    TRIG 73 05/20/2025    HDL 55 05/20/2025       Lab Results   Component Value Date    LDLCALC 85 05/20/2025        Nonrheumatic aortic valve stenosis  Mild progressive stage B. Repeat US 7/2026    Carotid artery disease  Stable asymptomatic. Results of carotid US discussed with her. Went over lipids, would like to add zetia to her atorvastatin 80mg            [1]   Past Medical History:  Diagnosis Date    Abnormal x-ray examination 03/27/2023    Age-related nuclear cataract of left eye 05/06/2024    Age-related nuclear cataract of 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    Allergic contact dermatitis 05/06/2024    Autoimmune disorder (Multi)     Lichen Planus    Bilateral carotid artery stenosis 09/02/2023    Carotid artery disease  "09/02/2023    Cervical lymphadenopathy 05/06/2024    Cheilitis 05/06/2024    Contusion 05/06/2024    Cutis laxa 05/06/2024    Delayed emergence from general anesthesia     Dermatochalasis 09/02/2023    Disorder of carotid artery 09/02/2023    Disorder of refraction 09/02/2023    Dry eye syndrome of unspecified lacrimal gland 05/17/2017    Dry eye syndrome    Dry eyes 09/02/2023    Dyslipidemia 09/02/2023    Dysphagia 05/06/2024    Essential hypertension 05/06/2024    Fall 05/06/2024    Female pelvic pain 11/14/2013    GERD (gastroesophageal reflux disease)     Heart valve disease     mitral valve prolapse with \"some\" regurgitation    History of left cataract extraction 10/04/2023    Hollenhorst plaque, 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    Hordeolum externum unspecified eye, unspecified eyelid 05/17/2017    Stye    Hyperlipidemia     Immature cataract 02/09/2021    Impaired glucose tolerance 09/02/2023    Lichen sclerosus et atrophicus 09/02/2023    Mitral valve regurgitation 09/02/2023    Nonrheumatic aortic valve stenosis 05/06/2024    Oral lichen planus 05/06/2024    Osteopenia     Osteopenia 09/02/2023    Pain around eye 02/09/2021    Pain of right breast 09/02/2023    Pain of right upper extremity 05/06/2024    Personal history of diseases of the skin and subcutaneous tissue     History of lichen planus    Presence of intraocular lens 05/06/2024    Pseudophakia     Status post bilateral cataract extraction     Status post left cataract extraction 10/04/2023    Status post right cataract extraction 10/04/2023    Toxic effect of venom of bees, accidental (unintentional), initial encounter 05/17/2017    Bee sting reaction    Vaginal atrophy 09/02/2023    Vitamin D deficiency 09/02/2023   [2]   Past Surgical History:  Procedure Laterality Date    CATARACT EXTRACTION      bilateral cataract surgery    CHOLECYSTECTOMY      COLONOSCOPY  2003    COLONOSCOPY  2012    COLONOSCOPY  2014    ESOPHAGOGASTRODUODENOSCOPY  2012    " ESOPHAGOGASTRODUODENOSCOPY  2014    MOUTH SURGERY  2018    OOPHORECTOMY Right     OTHER SURGICAL HISTORY  10/24/2022    right ovary removed    OTHER SURGICAL HISTORY  10/24/2022    OTHER SURGICAL HISTORY  10/24/2022    Cardiac catheterization    OTHER SURGICAL HISTORY  10/24/2022    Cholecystectomy    OTHER SURGICAL HISTORY      teeth pulled under anesthesia    PARS PLANA VITRECTOMY Right     2023    THROAT SURGERY      11/4/24   [3]   Family History  Problem Relation Name Age of Onset    Diabetes Mother      Heart disease Mother      Heart disease Father      No Known Problems Brother      Cancer Son      Breast cancer Mother's Sister

## 2025-07-28 ENCOUNTER — TELEPHONE (OUTPATIENT)
Dept: PRIMARY CARE | Facility: CLINIC | Age: 75
End: 2025-07-28
Payer: MEDICARE

## 2025-07-28 NOTE — TELEPHONE ENCOUNTER
Patient called to clarify whether she is due for the first shingrix or the second. (Last one I can see was 2022). Pt states that she believes she had one last year  and the second one should have been within a certain amount of time. She would like to clarify whether she needs to start the series over if she is overdue for #2?

## 2025-08-01 ENCOUNTER — TELEPHONE (OUTPATIENT)
Dept: PRIMARY CARE | Facility: CLINIC | Age: 75
End: 2025-08-01
Payer: MEDICARE

## 2025-08-01 NOTE — TELEPHONE ENCOUNTER
Pt confused if she needs to get shingles shot. States She had one in June of 2022?  Please call pt to clarify

## 2025-08-08 ENCOUNTER — TELEPHONE (OUTPATIENT)
Dept: PRIMARY CARE | Facility: CLINIC | Age: 75
End: 2025-08-08
Payer: MEDICARE

## 2025-08-08 DIAGNOSIS — D64.9 ANEMIA, UNSPECIFIED TYPE: ICD-10-CM

## 2025-08-09 LAB
BASOPHILS # BLD AUTO: 65 CELLS/UL (ref 0–200)
BASOPHILS NFR BLD AUTO: 0.6 %
EOSINOPHIL # BLD AUTO: 194 CELLS/UL (ref 15–500)
EOSINOPHIL NFR BLD AUTO: 1.8 %
ERYTHROCYTE [DISTWIDTH] IN BLOOD BY AUTOMATED COUNT: 15.1 % (ref 11–15)
HCT VFR BLD AUTO: 37.6 % (ref 35–45)
HGB BLD-MCNC: 11.8 G/DL (ref 11.7–15.5)
LYMPHOCYTES # BLD AUTO: 1858 CELLS/UL (ref 850–3900)
LYMPHOCYTES NFR BLD AUTO: 17.2 %
MCH RBC QN AUTO: 27.8 PG (ref 27–33)
MCHC RBC AUTO-ENTMCNC: 31.4 G/DL (ref 32–36)
MCV RBC AUTO: 88.5 FL (ref 80–100)
MONOCYTES # BLD AUTO: 972 CELLS/UL (ref 200–950)
MONOCYTES NFR BLD AUTO: 9 %
NEUTROPHILS # BLD AUTO: 7711 CELLS/UL (ref 1500–7800)
NEUTROPHILS NFR BLD AUTO: 71.4 %
PLATELET # BLD AUTO: 271 THOUSAND/UL (ref 140–400)
PMV BLD REES-ECKER: 10.2 FL (ref 7.5–12.5)
RBC # BLD AUTO: 4.25 MILLION/UL (ref 3.8–5.1)
WBC # BLD AUTO: 10.8 THOUSAND/UL (ref 3.8–10.8)

## 2025-08-11 ENCOUNTER — TELEPHONE (OUTPATIENT)
Dept: PRIMARY CARE | Facility: CLINIC | Age: 75
End: 2025-08-11
Payer: MEDICARE

## 2025-08-13 ENCOUNTER — OFFICE VISIT (OUTPATIENT)
Dept: OPHTHALMOLOGY | Facility: CLINIC | Age: 75
End: 2025-08-13
Payer: MEDICARE

## 2025-08-13 DIAGNOSIS — H35.341 MACULAR HOLE, RIGHT EYE: ICD-10-CM

## 2025-08-13 DIAGNOSIS — Z96.1 PSEUDOPHAKIA OF BOTH EYES: Primary | ICD-10-CM

## 2025-08-13 DIAGNOSIS — H52.7 REFRACTIVE ERROR: ICD-10-CM

## 2025-08-13 DIAGNOSIS — H04.129 DRY EYE: ICD-10-CM

## 2025-08-13 PROCEDURE — 99213 OFFICE O/P EST LOW 20 MIN: CPT | Performed by: OPHTHALMOLOGY

## 2025-08-13 ASSESSMENT — ENCOUNTER SYMPTOMS
EYES NEGATIVE: 0
ALLERGIC/IMMUNOLOGIC NEGATIVE: 0
ENDOCRINE NEGATIVE: 0
GASTROINTESTINAL NEGATIVE: 0
RESPIRATORY NEGATIVE: 0
MUSCULOSKELETAL NEGATIVE: 0
CARDIOVASCULAR NEGATIVE: 0
CONSTITUTIONAL NEGATIVE: 0
NEUROLOGICAL NEGATIVE: 0
HEMATOLOGIC/LYMPHATIC NEGATIVE: 0
PSYCHIATRIC NEGATIVE: 0

## 2025-08-13 ASSESSMENT — REFRACTION_WEARINGRX
OD_CYLINDER: -1.50
OD_AXIS: 101
OS_ADD: +2.75
OD_ADD: +2.75
SPECS_TYPE: PAL
OS_AXIS: 080
OS_CYLINDER: -1.25
OS_SPHERE: +0.75
OD_SPHERE: +0.75

## 2025-08-13 ASSESSMENT — SLIT LAMP EXAM - LIDS
COMMENTS: NORMAL
COMMENTS: NORMAL

## 2025-08-13 ASSESSMENT — VISUAL ACUITY
OD_CC: 20/70
OS_CC+: -1
OS_CC: 20/40
CORRECTION_TYPE: GLASSES
METHOD: SNELLEN - SINGLE
OD_CC+: -2

## 2025-08-13 ASSESSMENT — PAIN SCALES - GENERAL: PAINLEVEL_OUTOF10: 0-NO PAIN

## 2025-08-13 ASSESSMENT — EXTERNAL EXAM - RIGHT EYE: OD_EXAM: NORMAL

## 2025-08-13 ASSESSMENT — EXTERNAL EXAM - LEFT EYE: OS_EXAM: NORMAL

## 2025-08-15 ENCOUNTER — OFFICE VISIT (OUTPATIENT)
Dept: PRIMARY CARE | Facility: CLINIC | Age: 75
End: 2025-08-15
Payer: MEDICARE

## 2025-08-15 VITALS
WEIGHT: 147 LBS | SYSTOLIC BLOOD PRESSURE: 120 MMHG | OXYGEN SATURATION: 97 % | DIASTOLIC BLOOD PRESSURE: 66 MMHG | HEART RATE: 64 BPM | TEMPERATURE: 97 F | HEIGHT: 67 IN | BODY MASS INDEX: 23.07 KG/M2

## 2025-08-15 DIAGNOSIS — E78.2 MIXED HYPERLIPIDEMIA: ICD-10-CM

## 2025-08-15 DIAGNOSIS — H81.10 BENIGN PAROXYSMAL POSITIONAL VERTIGO, UNSPECIFIED LATERALITY: Primary | ICD-10-CM

## 2025-08-15 DIAGNOSIS — D50.9 IRON DEFICIENCY ANEMIA, UNSPECIFIED IRON DEFICIENCY ANEMIA TYPE: ICD-10-CM

## 2025-08-15 PROCEDURE — 1160F RVW MEDS BY RX/DR IN RCRD: CPT

## 2025-08-15 PROCEDURE — 1125F AMNT PAIN NOTED PAIN PRSNT: CPT

## 2025-08-15 PROCEDURE — 99214 OFFICE O/P EST MOD 30 MIN: CPT

## 2025-08-15 PROCEDURE — G2211 COMPLEX E/M VISIT ADD ON: HCPCS

## 2025-08-15 PROCEDURE — 1159F MED LIST DOCD IN RCRD: CPT

## 2025-08-15 ASSESSMENT — ENCOUNTER SYMPTOMS
HEADACHES: 0
DEPRESSION: 0
NUMBNESS: 0
SHORTNESS OF BREATH: 0
LOSS OF SENSATION IN FEET: 0
ACTIVITY CHANGE: 0
OCCASIONAL FEELINGS OF UNSTEADINESS: 1
WEAKNESS: 0
TREMORS: 0
APPETITE CHANGE: 0
LIGHT-HEADEDNESS: 0

## 2025-08-15 ASSESSMENT — LIFESTYLE VARIABLES
HAS A RELATIVE, FRIEND, DOCTOR, OR ANOTHER HEALTH PROFESSIONAL EXPRESSED CONCERN ABOUT YOUR DRINKING OR SUGGESTED YOU CUT DOWN: NO
HOW OFTEN DURING THE LAST YEAR HAVE YOU NEEDED AN ALCOHOLIC DRINK FIRST THING IN THE MORNING TO GET YOURSELF GOING AFTER A NIGHT OF HEAVY DRINKING: NEVER
HOW OFTEN DURING THE LAST YEAR HAVE YOU FOUND THAT YOU WERE NOT ABLE TO STOP DRINKING ONCE YOU HAD STARTED: NEVER
HOW OFTEN DO YOU HAVE A DRINK CONTAINING ALCOHOL: NEVER
HOW MANY STANDARD DRINKS CONTAINING ALCOHOL DO YOU HAVE ON A TYPICAL DAY: PATIENT DOES NOT DRINK
AUDIT TOTAL SCORE: 0
HAVE YOU OR SOMEONE ELSE BEEN INJURED AS A RESULT OF YOUR DRINKING: NO
SKIP TO QUESTIONS 9-10: 1
HOW OFTEN DO YOU HAVE SIX OR MORE DRINKS ON ONE OCCASION: NEVER
HOW OFTEN DURING THE LAST YEAR HAVE YOU HAD A FEELING OF GUILT OR REMORSE AFTER DRINKING: NEVER
AUDIT-C TOTAL SCORE: 0
HOW OFTEN DURING THE LAST YEAR HAVE YOU BEEN UNABLE TO REMEMBER WHAT HAPPENED THE NIGHT BEFORE BECAUSE YOU HAD BEEN DRINKING: NEVER
HOW OFTEN DURING THE LAST YEAR HAVE YOU FAILED TO DO WHAT WAS NORMALLY EXPECTED FROM YOU BECAUSE OF DRINKING: NEVER

## 2025-08-15 ASSESSMENT — PAIN SCALES - GENERAL: PAINLEVEL_OUTOF10: 7

## 2025-08-25 ENCOUNTER — TELEPHONE (OUTPATIENT)
Dept: PRIMARY CARE | Facility: CLINIC | Age: 75
End: 2025-08-25
Payer: MEDICARE

## 2025-09-02 ENCOUNTER — TELEPHONE (OUTPATIENT)
Dept: PRIMARY CARE | Facility: CLINIC | Age: 75
End: 2025-09-02
Payer: MEDICARE

## 2025-09-18 ENCOUNTER — APPOINTMENT (OUTPATIENT)
Dept: OPHTHALMOLOGY | Facility: CLINIC | Age: 75
End: 2025-09-18
Payer: MEDICARE

## 2026-01-22 ENCOUNTER — APPOINTMENT (OUTPATIENT)
Dept: OPHTHALMOLOGY | Facility: CLINIC | Age: 76
End: 2026-01-22
Payer: MEDICARE

## (undated) DEVICE — LENS, FLAT VITRECTOMY, DISPOSABLE

## (undated) DEVICE — Device

## (undated) DEVICE — INSTRUMENT, GRIESHABER REVOLUTION, 25 GA, ILM FORCEP

## (undated) DEVICE — BIOM, OPTIC, HD PROFESSIONAL F/F=200MM

## (undated) DEVICE — 25+GA HYPERVIT BEVEL 20K CPM WIDE TOTAL PLUS VITRECTOMY PAK

## (undated) DEVICE — NEEDLE, SOFT TIP, 25G, 0.8MM

## (undated) DEVICE — NEEDLE, RETROBULBAR, 23G X 8MM

## (undated) DEVICE — AUTO GAS FULL, CONSTELLATION